# Patient Record
Sex: FEMALE | Race: WHITE | NOT HISPANIC OR LATINO | URBAN - METROPOLITAN AREA
[De-identification: names, ages, dates, MRNs, and addresses within clinical notes are randomized per-mention and may not be internally consistent; named-entity substitution may affect disease eponyms.]

---

## 2017-08-12 ENCOUNTER — EMERGENCY (EMERGENCY)
Facility: HOSPITAL | Age: 82
LOS: 0 days | Discharge: HOME | End: 2017-08-13

## 2017-08-12 DIAGNOSIS — M79.89 OTHER SPECIFIED SOFT TISSUE DISORDERS: ICD-10-CM

## 2017-08-12 DIAGNOSIS — K56.600 PARTIAL INTESTINAL OBSTRUCTION, UNSPECIFIED AS TO CAUSE: ICD-10-CM

## 2017-08-12 DIAGNOSIS — I10 ESSENTIAL (PRIMARY) HYPERTENSION: ICD-10-CM

## 2017-08-12 DIAGNOSIS — E78.00 PURE HYPERCHOLESTEROLEMIA, UNSPECIFIED: ICD-10-CM

## 2017-08-12 DIAGNOSIS — L03.011 CELLULITIS OF RIGHT FINGER: ICD-10-CM

## 2017-08-12 DIAGNOSIS — Z88.0 ALLERGY STATUS TO PENICILLIN: ICD-10-CM

## 2017-08-12 DIAGNOSIS — E03.9 HYPOTHYROIDISM, UNSPECIFIED: ICD-10-CM

## 2017-08-12 DIAGNOSIS — K29.70 GASTRITIS, UNSPECIFIED, WITHOUT BLEEDING: ICD-10-CM

## 2017-09-06 ENCOUNTER — INPATIENT (INPATIENT)
Facility: HOSPITAL | Age: 82
LOS: 1 days | Discharge: HOME | End: 2017-09-08
Attending: HOSPITALIST

## 2017-09-06 DIAGNOSIS — K56.600 PARTIAL INTESTINAL OBSTRUCTION, UNSPECIFIED AS TO CAUSE: ICD-10-CM

## 2017-09-06 DIAGNOSIS — K29.70 GASTRITIS, UNSPECIFIED, WITHOUT BLEEDING: ICD-10-CM

## 2017-09-12 DIAGNOSIS — K56.69 OTHER INTESTINAL OBSTRUCTION: ICD-10-CM

## 2017-09-12 DIAGNOSIS — N17.9 ACUTE KIDNEY FAILURE, UNSPECIFIED: ICD-10-CM

## 2017-09-12 DIAGNOSIS — N18.9 CHRONIC KIDNEY DISEASE, UNSPECIFIED: ICD-10-CM

## 2017-09-12 DIAGNOSIS — Z87.891 PERSONAL HISTORY OF NICOTINE DEPENDENCE: ICD-10-CM

## 2017-09-12 DIAGNOSIS — E03.9 HYPOTHYROIDISM, UNSPECIFIED: ICD-10-CM

## 2017-09-12 DIAGNOSIS — I12.9 HYPERTENSIVE CHRONIC KIDNEY DISEASE WITH STAGE 1 THROUGH STAGE 4 CHRONIC KIDNEY DISEASE, OR UNSPECIFIED CHRONIC KIDNEY DISEASE: ICD-10-CM

## 2017-09-12 DIAGNOSIS — K29.70 GASTRITIS, UNSPECIFIED, WITHOUT BLEEDING: ICD-10-CM

## 2017-09-12 DIAGNOSIS — Z88.0 ALLERGY STATUS TO PENICILLIN: ICD-10-CM

## 2017-09-12 DIAGNOSIS — E86.0 DEHYDRATION: ICD-10-CM

## 2017-09-12 DIAGNOSIS — I10 ESSENTIAL (PRIMARY) HYPERTENSION: ICD-10-CM

## 2017-09-26 PROBLEM — Z00.00 ENCOUNTER FOR PREVENTIVE HEALTH EXAMINATION: Status: ACTIVE | Noted: 2017-09-26

## 2018-02-08 ENCOUNTER — OUTPATIENT (OUTPATIENT)
Dept: OUTPATIENT SERVICES | Facility: HOSPITAL | Age: 83
LOS: 1 days | Discharge: HOME | End: 2018-02-08

## 2018-02-08 VITALS
SYSTOLIC BLOOD PRESSURE: 134 MMHG | TEMPERATURE: 96 F | OXYGEN SATURATION: 99 % | RESPIRATION RATE: 17 BRPM | HEART RATE: 42 BPM | DIASTOLIC BLOOD PRESSURE: 59 MMHG

## 2018-02-08 VITALS — SYSTOLIC BLOOD PRESSURE: 110 MMHG | RESPIRATION RATE: 18 BRPM | HEART RATE: 75 BPM | DIASTOLIC BLOOD PRESSURE: 57 MMHG

## 2018-02-08 DIAGNOSIS — Z98.890 OTHER SPECIFIED POSTPROCEDURAL STATES: Chronic | ICD-10-CM

## 2018-02-08 RX ORDER — ACETAMINOPHEN 500 MG
650 TABLET ORAL ONCE
Qty: 0 | Refills: 0 | Status: DISCONTINUED | OUTPATIENT
Start: 2018-02-08 | End: 2018-02-08

## 2018-02-13 DIAGNOSIS — H25.89 OTHER AGE-RELATED CATARACT: ICD-10-CM

## 2018-02-13 DIAGNOSIS — Z88.0 ALLERGY STATUS TO PENICILLIN: ICD-10-CM

## 2018-02-15 ENCOUNTER — OUTPATIENT (OUTPATIENT)
Dept: OUTPATIENT SERVICES | Facility: HOSPITAL | Age: 83
LOS: 1 days | Discharge: HOME | End: 2018-02-15

## 2018-02-15 VITALS
DIASTOLIC BLOOD PRESSURE: 59 MMHG | TEMPERATURE: 97 F | HEART RATE: 64 BPM | RESPIRATION RATE: 17 BRPM | SYSTOLIC BLOOD PRESSURE: 123 MMHG | OXYGEN SATURATION: 98 % | HEIGHT: 61 IN | WEIGHT: 132.06 LBS

## 2018-02-15 VITALS — SYSTOLIC BLOOD PRESSURE: 127 MMHG | HEART RATE: 98 BPM | DIASTOLIC BLOOD PRESSURE: 59 MMHG

## 2018-02-15 DIAGNOSIS — Z98.890 OTHER SPECIFIED POSTPROCEDURAL STATES: Chronic | ICD-10-CM

## 2018-02-15 RX ORDER — ACETAMINOPHEN 500 MG
325 TABLET ORAL ONCE
Qty: 0 | Refills: 0 | Status: ON HOLD | OUTPATIENT
Start: 2018-02-15

## 2018-02-15 RX ORDER — SODIUM CHLORIDE 9 MG/ML
1000 INJECTION INTRAMUSCULAR; INTRAVENOUS; SUBCUTANEOUS
Qty: 0 | Refills: 0 | Status: ON HOLD | OUTPATIENT
Start: 2018-02-15

## 2018-02-15 NOTE — PRE-ANESTHESIA EVALUATION ADULT - NSANTHOSAYNRD_GEN_A_CORE
No. JIM screening performed.  STOP BANG Legend: 0-2 = LOW Risk; 3-4 = INTERMEDIATE Risk; 5-8 = HIGH Risk

## 2018-02-20 DIAGNOSIS — H25.89 OTHER AGE-RELATED CATARACT: ICD-10-CM

## 2018-02-20 DIAGNOSIS — E03.9 HYPOTHYROIDISM, UNSPECIFIED: ICD-10-CM

## 2018-02-20 DIAGNOSIS — I10 ESSENTIAL (PRIMARY) HYPERTENSION: ICD-10-CM

## 2019-05-20 NOTE — PACU DISCHARGE NOTE - AIRWAY PATENCY:
Dr Sharma at bedside to evaluate pt for discharge from PACU. Pt Drowsy and oriented X3. Answers appropriately. States thirsty. Pt prepared fo transfer to ACU.   Satisfactory

## 2020-10-08 PROBLEM — E78.00 PURE HYPERCHOLESTEROLEMIA, UNSPECIFIED: Chronic | Status: ACTIVE | Noted: 2018-02-08

## 2020-10-08 PROBLEM — I10 ESSENTIAL (PRIMARY) HYPERTENSION: Chronic | Status: ACTIVE | Noted: 2018-02-08

## 2020-10-08 PROBLEM — E03.9 HYPOTHYROIDISM, UNSPECIFIED: Chronic | Status: ACTIVE | Noted: 2018-02-08

## 2020-10-12 ENCOUNTER — APPOINTMENT (OUTPATIENT)
Dept: OBGYN | Facility: CLINIC | Age: 85
End: 2020-10-12

## 2020-10-12 RX ORDER — AMLODIPINE BESYLATE 5 MG/1
5 TABLET ORAL
Qty: 90 | Refills: 0 | Status: ACTIVE | COMMUNITY
Start: 2020-10-06

## 2020-10-12 RX ORDER — ATORVASTATIN CALCIUM 20 MG/1
20 TABLET, FILM COATED ORAL
Qty: 90 | Refills: 0 | Status: ACTIVE | COMMUNITY
Start: 2020-10-06

## 2020-10-12 RX ORDER — LEVOTHYROXINE SODIUM 50 UG/1
50 TABLET ORAL
Qty: 90 | Refills: 0 | Status: ACTIVE | COMMUNITY
Start: 2020-10-02

## 2020-10-12 RX ORDER — VALSARTAN AND HYDROCHLOROTHIAZIDE 160; 12.5 MG/1; MG/1
160-12.5 TABLET, FILM COATED ORAL
Qty: 90 | Refills: 0 | Status: ACTIVE | COMMUNITY
Start: 2020-10-06

## 2020-11-25 ENCOUNTER — APPOINTMENT (OUTPATIENT)
Dept: OBGYN | Facility: CLINIC | Age: 85
End: 2020-11-25

## 2021-06-21 ENCOUNTER — APPOINTMENT (OUTPATIENT)
Dept: UROLOGY | Facility: CLINIC | Age: 86
End: 2021-06-21
Payer: MEDICARE

## 2021-06-21 VITALS
SYSTOLIC BLOOD PRESSURE: 112 MMHG | BODY MASS INDEX: 23.55 KG/M2 | DIASTOLIC BLOOD PRESSURE: 74 MMHG | HEIGHT: 62 IN | HEART RATE: 80 BPM | WEIGHT: 128 LBS

## 2021-06-21 DIAGNOSIS — N90.89 OTHER SPECIFIED NONINFLAMMATORY DISORDERS OF VULVA AND PERINEUM: ICD-10-CM

## 2021-06-21 DIAGNOSIS — R35.0 FREQUENCY OF MICTURITION: ICD-10-CM

## 2021-06-21 PROCEDURE — 99203 OFFICE O/P NEW LOW 30 MIN: CPT

## 2021-06-21 RX ORDER — NYSTATIN 100000 [USP'U]/G
100000 CREAM TOPICAL TWICE DAILY
Qty: 1 | Refills: 1 | Status: ACTIVE | COMMUNITY
Start: 2021-06-21 | End: 1900-01-01

## 2021-06-21 NOTE — ASSESSMENT
[FreeTextEntry1] : The patient is an 87F with a h/o:\par \par 1. Urinary frequency and UI.\par I don't know what meds she is on and our bladder scanner is not working.\par We will send her for a RBUS with PVR and will send her urine for a UA C&S with micro\par I urged her to cut down her coffee consumption.\par She will bring her meds to her next visit so I know what she is taking\par \par 2. Likely yeast infection:\par I will give her Nystatin for her EXTERNAL genitalia only.\par apply BID\par \par 3. [place]\par She refuses an audiology referral\par \par \par LABS/TESTS Ordered: RBUS with PVR, UA C&S\par Meds Ordered: Nystatin; bring in all meds to next visit\par Follow up: 3-4 weeks\par \par \par \par Devi Chapa MD\par Associate \par Department of Urology\par [facility]\par Phone: [phone]\par Fax: [phone]\par \par [address] [place] [address]\par [facility] [address]\par

## 2021-06-21 NOTE — HISTORY OF PRESENT ILLNESS
[FreeTextEntry1] : Barriers to care: Very [place]\par \par CC: urinary frequency\par \par \par HPI:\par The patient is an 87F unaccompanied, who c/o urinary frequency since 2021.\par She reports that this happens day and night.\par She wears Kotex for protection about 5-6/day\par She is markedly constipated.\par She denies straining to void. She sometimes leaks when she just stands. \par She states she is very "itchy" and has had genital itching since May 2021. She does not have a gynecologist.\par She takes showers not baths. She has 2c coffee per day. She rarely drinks tea or soda. She does not drink EtOH or spicy / acidic foods.\par She has no h/o  trauma or surgery.\par She is , both \par She went through menopause around age 54 or 54yo.\par \par \par PMH: [place], ?hypothyroid\par PSH: Diverticulitis\par POBH: See above\par MEDS: She does not know her meds\par Thyroid med\par SOC: Quit Tob around age 58yo, Denies EtOH, Denies drugs\par ALL: Denies\par \par \par \par Data:\par 2021: BUN 28, sCre 1.42

## 2021-06-21 NOTE — PHYSICAL EXAM
[General Appearance - Well Developed] : well developed [General Appearance - Well Nourished] : well nourished [Normal Appearance] : normal appearance [Well Groomed] : well groomed [General Appearance - In No Acute Distress] : no acute distress [Exaggerated Use Of Accessory Muscles For Inspiration] : no accessory muscle use [Abdomen Soft] : soft [Abdomen Tenderness] : non-tender [Skin Color & Pigmentation] : normal skin color and pigmentation [Skin Turgor] : supple [] : no rash [FreeTextEntry1] : Extremely atrophic genitalia; some redness of labia majora. Mild. No exudate, purulence, fluctuance, lesions

## 2021-07-13 ENCOUNTER — EMERGENCY (EMERGENCY)
Facility: HOSPITAL | Age: 86
LOS: 0 days | Discharge: HOME | End: 2021-07-13
Attending: EMERGENCY MEDICINE | Admitting: EMERGENCY MEDICINE
Payer: MEDICARE

## 2021-07-13 VITALS
SYSTOLIC BLOOD PRESSURE: 134 MMHG | HEIGHT: 61 IN | RESPIRATION RATE: 18 BRPM | HEART RATE: 83 BPM | OXYGEN SATURATION: 98 % | TEMPERATURE: 98 F | DIASTOLIC BLOOD PRESSURE: 60 MMHG

## 2021-07-13 DIAGNOSIS — Z98.890 OTHER SPECIFIED POSTPROCEDURAL STATES: Chronic | ICD-10-CM

## 2021-07-13 DIAGNOSIS — E03.9 HYPOTHYROIDISM, UNSPECIFIED: ICD-10-CM

## 2021-07-13 DIAGNOSIS — L02.415 CUTANEOUS ABSCESS OF RIGHT LOWER LIMB: ICD-10-CM

## 2021-07-13 DIAGNOSIS — I10 ESSENTIAL (PRIMARY) HYPERTENSION: ICD-10-CM

## 2021-07-13 DIAGNOSIS — Z88.0 ALLERGY STATUS TO PENICILLIN: ICD-10-CM

## 2021-07-13 DIAGNOSIS — Z98.890 OTHER SPECIFIED POSTPROCEDURAL STATES: ICD-10-CM

## 2021-07-13 DIAGNOSIS — L03.115 CELLULITIS OF RIGHT LOWER LIMB: ICD-10-CM

## 2021-07-13 DIAGNOSIS — M25.571 PAIN IN RIGHT ANKLE AND JOINTS OF RIGHT FOOT: ICD-10-CM

## 2021-07-13 DIAGNOSIS — Z79.890 HORMONE REPLACEMENT THERAPY: ICD-10-CM

## 2021-07-13 DIAGNOSIS — E78.00 PURE HYPERCHOLESTEROLEMIA, UNSPECIFIED: ICD-10-CM

## 2021-07-13 DIAGNOSIS — Z79.899 OTHER LONG TERM (CURRENT) DRUG THERAPY: ICD-10-CM

## 2021-07-13 PROCEDURE — 99283 EMERGENCY DEPT VISIT LOW MDM: CPT | Mod: 25

## 2021-07-13 PROCEDURE — 10060 I&D ABSCESS SIMPLE/SINGLE: CPT

## 2021-07-13 RX ADMIN — Medication 100 MILLIGRAM(S): at 20:07

## 2021-07-13 NOTE — ED PROVIDER NOTE - NSFOLLOWUPINSTRUCTIONS_ED_ALL_ED_FT
Follow up with your primary care doctor in 2 days for wound check    Cellulitis    WHAT YOU NEED TO KNOW:    Cellulitis is a skin infection caused by bacteria. Cellulitis may go away on its own or you may need treatment. Your healthcare provider may draw a [place] around the outside edges of your cellulitis. If your cellulitis spreads, your healthcare provider will see it outside of the [place]. Cellulitis          DISCHARGE INSTRUCTIONS:    Call 911 if:     You have sudden trouble breathing or chest pain.        Return to the emergency department if:     Your wound gets larger and more painful.       You feel a crackling under your skin when you touch it.      You have purple dots or bumps on your skin, or you see bleeding under your skin.      You have new swelling and pain in your legs.      The red, warm, swollen area gets larger.      You see red streaks coming from the infected area.    Contact your healthcare provider if:     You have a fever.      Your fever or pain does not go away or gets worse.      The area does not get smaller after 2 days of antibiotics.      Your skin is flaking or peeling off.      You have questions or concerns about your condition or care.    Medicines:     Antibiotics help treat the bacterial infection.       NSAIDs, such as ibuprofen, help decrease swelling, pain, and fever. NSAIDs can cause stomach bleeding or kidney problems in certain people. If you take blood thinner medicine, always ask if NSAIDs are safe for you. Always read the medicine label and follow directions. Do not give these medicines to children under 6 months of age without direction from your child's healthcare provider.      Acetaminophen decreases pain and fever. It is available without a doctor's order. Ask how much to take and how often to take it. Follow directions. Read the labels of all other medicines you are using to see if they also contain acetaminophen, or ask your doctor or pharmacist. Acetaminophen can cause liver damage if not taken correctly. Do not use more than 4 grams (4,000 milligrams) total of acetaminophen in one day.       Take your medicine as directed. Contact your healthcare provider if you think your medicine is not helping or if you have side effects. Tell him or her if you are allergic to any medicine. Keep a list of the medicines, vitamins, and herbs you take. Include the amounts, and when and why you take them. Bring the list or the pill bottles to follow-up visits. Carry your medicine list with you in case of an emergency.    Self-care:     Elevate the area above the level of your heart as often as you can. This will help decrease swelling and pain. Prop the area on pillows or blankets to keep it elevated comfortably.       Clean the area daily until the wound scabs over. Gently wash the area with soap and water. Pat dry. Use dressings as directed.       Place cool or warm, wet cloths on the area as directed. Use clean cloths and clean water. Leave it on the area until the cloth is room temperature. Pat the area dry with a clean, dry cloth. The cloths may help decrease pain.     Prevent cellulitis:     Do not scratch bug bites or areas of injury. You increase your risk for cellulitis by scratching these areas.       Do not share personal items, such as towels, clothing, and razors.       Clean exercise equipment with germ-killing  before and after you use it.      Wash your hands often. Use soap and water. Wash your hands after you use the bathroom, change a child's diapers, or sneeze. Wash your hands before you prepare or eat food. Use lotion to prevent dry, cracked skin. Handwashing           Wear pressure stockings as directed. You may be told to wear the stockings if you have peripheral edema. The stockings improve blood flow and decrease swelling.      Treat athlete’s foot. This can help prevent the spread of a bacterial skin infection.    Follow up with your healthcare provider within 3 days, or as directed: Your healthcare provider will check if your cellulitis is getting better. You may need different medicine. Write down your questions so you remember to ask them during your visits.       © Copyright [employer] 2019 All illustrations and images included in CareNotes are the copyrighted property of [employer] or [employer]       Abscess    WHAT YOU NEED TO KNOW:    A warm compress may help your abscess drain. Your healthcare provider may make a cut in the abscess so it can drain. You may need surgery to remove an abscess that is on your hands or buttocks.     DISCHARGE INSTRUCTIONS:    Return to the emergency department if:     The area around your abscess becomes very painful, warm, or has red streaks.      You have a fever and chills.      Your heart is beating faster than usual.       You feel faint or confused.    Contact your healthcare provider if:     Your abscess gets bigger or does not get better.      Your abscess returns.      You have questions or concerns about your condition or care.    Medicines: You may need any of the following:     Antibiotics help treat a bacterial infection.       Acetaminophen decreases pain and fever. It is available without a doctor's order. Ask how much to take and how often to take it. Follow directions. Acetaminophen can cause liver damage if not taken correctly.      NSAIDs, such as ibuprofen, help decrease swelling, pain, and fever. This medicine is available with or without a doctor's order. NSAIDs can cause stomach bleeding or kidney problems in certain people. If you take blood thinner medicine, always ask your healthcare provider if NSAIDs are safe for you. Always read the medicine label and follow directions.      Take your medicine as directed. Contact your healthcare provider if you think your medicine is not helping or if you have side effects. Tell him or her if you are allergic to any medicine. Keep a list of the medicines, vitamins, and herbs you take. Include the amounts, and when and why you take them. Bring the list or the pill bottles to follow-up visits. Carry your medicine list with you in case of an emergency.    Self-care:     Apply a warm compress to your abscess. This will help it open and drain. Wet a washcloth in warm, but not hot, water. Apply the compress for 10 minutes. Repeat this 4 times each day. Do not press on an abscess or try to open it with a needle. You may push the bacteria deeper or into your blood.       Do not share your clothes, towels, or sheets with anyone. This can spread the infection to others.       Wash your hands often. This can help prevent the spread of germs. Use soap and water or an alcohol-based hand rub.     Care for your wound after it is drained:     Care for your wound as directed. If your healthcare provider says it is okay, carefully remove the bandage and gauze packing. You may need to soak the gauze to get it out of your wound. Clean your wound and the area around it as directed. Dry the area and put on new, clean bandages. Change your bandages when they get wet or dirty.       Ask your healthcare provider how to change the gauze in your wound. Keep track of how many pieces of gauze are placed inside the wound. Do not put too much packing in the wound. Do not pack the gauze too tightly in your wound.     Follow up with your healthcare provider in 1 to 3 days: You may need to have your packing removed or your bandage changed. Write down your questions so you remember to ask them during your visits.        © Copyright [employer] 2019 All illustrations and images included in CareNotes are the copyrighted property of [employer] or [employer].

## 2021-07-13 NOTE — ED PROVIDER NOTE - PHYSICAL EXAMINATION
Physical Exam    Vital Signs: I have reviewed the initial vital signs.  Constitutional: well-nourished, appears stated age, no acute distress  Musculoskeletal: supple neck, no lower extremity edema, no midline tenderness  Integumentary: warm, dry, + silver dollar sized area to lower leg with scab and fluctuance noted. erythema extending from center.   Neurologic: awake, alert, cranial nerves II-XII grossly intact, extremities’ motor and sensory functions grossly intact  Psychiatric: appropriate mood, appropriate affect

## 2021-07-13 NOTE — ED PROVIDER NOTE - OBJECTIVE STATEMENT
87 year old female states that she cut herself about 9-10 days ago and developed large scab and now states its swollen and starting to turn red. denies fever/chills.

## 2021-07-13 NOTE — ED ADULT TRIAGE NOTE - CHIEF COMPLAINT QUOTE
Patient dropped large china cabinet on her foot/ankle 9 days ago. Daughter reports the wound is not healing since then. Patient reports no issues ambulating, no pain since injury.

## 2021-07-13 NOTE — ED PROVIDER NOTE - PATIENT PORTAL LINK FT
You can access the FollowMyHealth Patient Portal offered by [facility] by registering at the following website: [email or web address]. By joining [facility]’s FollowMyHealth portal, you will also be able to view your health information using other applications (apps) compatible with our system.

## 2021-07-13 NOTE — ED PROVIDER NOTE - NS ED ROS FT
Constitutional: (-) fever  Musculoskeletal: (-) neck pain, (-) back pain, (-) joint pain  Integumentary: (+) infection  Neurological: (-) headache, (-) altered mental status

## 2021-07-13 NOTE — ED PROVIDER NOTE - NSFOLLOWUPCLINICS_GEN_ALL_ED_FT
[facility]  Burn  [address], [address]  [place] [address]  Phone: [phone]  Fax:

## 2021-07-19 ENCOUNTER — APPOINTMENT (OUTPATIENT)
Dept: UROLOGY | Facility: CLINIC | Age: 86
End: 2021-07-19

## 2021-07-20 ENCOUNTER — APPOINTMENT (OUTPATIENT)
Dept: BURN CARE | Facility: CLINIC | Age: 86
End: 2021-07-20

## 2021-09-16 NOTE — ASU PATIENT PROFILE, ADULT - PATIENT'S HEIGHT AND WEIGHT RECORDED IN THE VITAL SIGNS FLOWSHEET
yes No Repair - Repaired With Adjacent Surgical Defect Text (Leave Blank If You Do Not Want): After the excision the defect was repaired concurrently with another surgical defect which was in close approximation.

## 2022-01-01 ENCOUNTER — INPATIENT (INPATIENT)
Facility: HOSPITAL | Age: 87
LOS: 19 days | End: 2022-09-27
Attending: INTERNAL MEDICINE | Admitting: INTERNAL MEDICINE

## 2022-01-01 VITALS
RESPIRATION RATE: 21 BRPM | DIASTOLIC BLOOD PRESSURE: 68 MMHG | SYSTOLIC BLOOD PRESSURE: 133 MMHG | HEART RATE: 113 BPM | OXYGEN SATURATION: 90 %

## 2022-01-01 VITALS
SYSTOLIC BLOOD PRESSURE: 171 MMHG | RESPIRATION RATE: 12 BRPM | HEART RATE: 83 BPM | DIASTOLIC BLOOD PRESSURE: 75 MMHG | OXYGEN SATURATION: 100 %

## 2022-01-01 DIAGNOSIS — F41.9 ANXIETY DISORDER, UNSPECIFIED: ICD-10-CM

## 2022-01-01 DIAGNOSIS — R53.81 OTHER MALAISE: ICD-10-CM

## 2022-01-01 DIAGNOSIS — Z98.890 OTHER SPECIFIED POSTPROCEDURAL STATES: Chronic | ICD-10-CM

## 2022-01-01 DIAGNOSIS — Z51.5 ENCOUNTER FOR PALLIATIVE CARE: ICD-10-CM

## 2022-01-01 DIAGNOSIS — J96.90 RESPIRATORY FAILURE, UNSPECIFIED, UNSPECIFIED WHETHER WITH HYPOXIA OR HYPERCAPNIA: ICD-10-CM

## 2022-01-01 DIAGNOSIS — Z71.89 OTHER SPECIFIED COUNSELING: ICD-10-CM

## 2022-01-01 LAB
A1C WITH ESTIMATED AVERAGE GLUCOSE RESULT: 4.9 % — SIGNIFICANT CHANGE UP (ref 4–5.6)
ALBUMIN SERPL ELPH-MCNC: 2.8 G/DL — LOW (ref 3.5–5.2)
ALBUMIN SERPL ELPH-MCNC: 2.8 G/DL — LOW (ref 3.5–5.2)
ALBUMIN SERPL ELPH-MCNC: 2.9 G/DL — LOW (ref 3.5–5.2)
ALBUMIN SERPL ELPH-MCNC: 2.9 G/DL — LOW (ref 3.5–5.2)
ALBUMIN SERPL ELPH-MCNC: 3 G/DL — LOW (ref 3.5–5.2)
ALBUMIN SERPL ELPH-MCNC: 3.1 G/DL — LOW (ref 3.5–5.2)
ALBUMIN SERPL ELPH-MCNC: 3.2 G/DL — LOW (ref 3.5–5.2)
ALBUMIN SERPL ELPH-MCNC: 3.3 G/DL — LOW (ref 3.5–5.2)
ALBUMIN SERPL ELPH-MCNC: 3.4 G/DL — LOW (ref 3.5–5.2)
ALBUMIN SERPL ELPH-MCNC: 3.4 G/DL — LOW (ref 3.5–5.2)
ALBUMIN SERPL ELPH-MCNC: 3.5 G/DL — SIGNIFICANT CHANGE UP (ref 3.5–5.2)
ALBUMIN SERPL ELPH-MCNC: 3.5 G/DL — SIGNIFICANT CHANGE UP (ref 3.5–5.2)
ALBUMIN SERPL ELPH-MCNC: 4.4 G/DL — SIGNIFICANT CHANGE UP (ref 3.5–5.2)
ALP SERPL-CCNC: 100 U/L — SIGNIFICANT CHANGE UP (ref 30–115)
ALP SERPL-CCNC: 100 U/L — SIGNIFICANT CHANGE UP (ref 30–115)
ALP SERPL-CCNC: 104 U/L — SIGNIFICANT CHANGE UP (ref 30–115)
ALP SERPL-CCNC: 106 U/L — SIGNIFICANT CHANGE UP (ref 30–115)
ALP SERPL-CCNC: 106 U/L — SIGNIFICANT CHANGE UP (ref 30–115)
ALP SERPL-CCNC: 107 U/L — SIGNIFICANT CHANGE UP (ref 30–115)
ALP SERPL-CCNC: 107 U/L — SIGNIFICANT CHANGE UP (ref 30–115)
ALP SERPL-CCNC: 113 U/L — SIGNIFICANT CHANGE UP (ref 30–115)
ALP SERPL-CCNC: 113 U/L — SIGNIFICANT CHANGE UP (ref 30–115)
ALP SERPL-CCNC: 115 U/L — SIGNIFICANT CHANGE UP (ref 30–115)
ALP SERPL-CCNC: 120 U/L — HIGH (ref 30–115)
ALP SERPL-CCNC: 151 U/L — HIGH (ref 30–115)
ALP SERPL-CCNC: 156 U/L — HIGH (ref 30–115)
ALP SERPL-CCNC: 175 U/L — HIGH (ref 30–115)
ALP SERPL-CCNC: 86 U/L — SIGNIFICANT CHANGE UP (ref 30–115)
ALP SERPL-CCNC: 91 U/L — SIGNIFICANT CHANGE UP (ref 30–115)
ALP SERPL-CCNC: 95 U/L — SIGNIFICANT CHANGE UP (ref 30–115)
ALP SERPL-CCNC: 96 U/L — SIGNIFICANT CHANGE UP (ref 30–115)
ALP SERPL-CCNC: 97 U/L — SIGNIFICANT CHANGE UP (ref 30–115)
ALP SERPL-CCNC: 97 U/L — SIGNIFICANT CHANGE UP (ref 30–115)
ALP SERPL-CCNC: 99 U/L — SIGNIFICANT CHANGE UP (ref 30–115)
ALT FLD-CCNC: 10 U/L — SIGNIFICANT CHANGE UP (ref 0–41)
ALT FLD-CCNC: 12 U/L — SIGNIFICANT CHANGE UP (ref 0–41)
ALT FLD-CCNC: 13 U/L — SIGNIFICANT CHANGE UP (ref 0–41)
ALT FLD-CCNC: 16 U/L — SIGNIFICANT CHANGE UP (ref 0–41)
ALT FLD-CCNC: 20 U/L — SIGNIFICANT CHANGE UP (ref 0–41)
ALT FLD-CCNC: 21 U/L — SIGNIFICANT CHANGE UP (ref 0–41)
ALT FLD-CCNC: 23 U/L — SIGNIFICANT CHANGE UP (ref 0–41)
ALT FLD-CCNC: 23 U/L — SIGNIFICANT CHANGE UP (ref 0–41)
ALT FLD-CCNC: 29 U/L — SIGNIFICANT CHANGE UP (ref 0–41)
ALT FLD-CCNC: 30 U/L — SIGNIFICANT CHANGE UP (ref 0–41)
ALT FLD-CCNC: 32 U/L — SIGNIFICANT CHANGE UP (ref 0–41)
ALT FLD-CCNC: 38 U/L — SIGNIFICANT CHANGE UP (ref 0–41)
ALT FLD-CCNC: 42 U/L — HIGH (ref 0–41)
ALT FLD-CCNC: 42 U/L — HIGH (ref 0–41)
ALT FLD-CCNC: 63 U/L — HIGH (ref 0–41)
ALT FLD-CCNC: 87 U/L — HIGH (ref 0–41)
ALT FLD-CCNC: 9 U/L — SIGNIFICANT CHANGE UP (ref 0–41)
ANION GAP SERPL CALC-SCNC: 11 MMOL/L — SIGNIFICANT CHANGE UP (ref 7–14)
ANION GAP SERPL CALC-SCNC: 12 MMOL/L — SIGNIFICANT CHANGE UP (ref 7–14)
ANION GAP SERPL CALC-SCNC: 12 MMOL/L — SIGNIFICANT CHANGE UP (ref 7–14)
ANION GAP SERPL CALC-SCNC: 13 MMOL/L — SIGNIFICANT CHANGE UP (ref 7–14)
ANION GAP SERPL CALC-SCNC: 15 MMOL/L — HIGH (ref 7–14)
ANION GAP SERPL CALC-SCNC: 5 MMOL/L — LOW (ref 7–14)
ANION GAP SERPL CALC-SCNC: 6 MMOL/L — LOW (ref 7–14)
ANION GAP SERPL CALC-SCNC: 7 MMOL/L — SIGNIFICANT CHANGE UP (ref 7–14)
ANION GAP SERPL CALC-SCNC: 8 MMOL/L — SIGNIFICANT CHANGE UP (ref 7–14)
ANION GAP SERPL CALC-SCNC: 9 MMOL/L — SIGNIFICANT CHANGE UP (ref 7–14)
ANION GAP SERPL CALC-SCNC: 9 MMOL/L — SIGNIFICANT CHANGE UP (ref 7–14)
ANISOCYTOSIS BLD QL: SLIGHT — SIGNIFICANT CHANGE UP
APPEARANCE UR: CLEAR — SIGNIFICANT CHANGE UP
AST SERPL-CCNC: 13 U/L — SIGNIFICANT CHANGE UP (ref 0–41)
AST SERPL-CCNC: 14 U/L — SIGNIFICANT CHANGE UP (ref 0–41)
AST SERPL-CCNC: 14 U/L — SIGNIFICANT CHANGE UP (ref 0–41)
AST SERPL-CCNC: 17 U/L — SIGNIFICANT CHANGE UP (ref 0–41)
AST SERPL-CCNC: 18 U/L — SIGNIFICANT CHANGE UP (ref 0–41)
AST SERPL-CCNC: 19 U/L — SIGNIFICANT CHANGE UP (ref 0–41)
AST SERPL-CCNC: 19 U/L — SIGNIFICANT CHANGE UP (ref 0–41)
AST SERPL-CCNC: 20 U/L — SIGNIFICANT CHANGE UP (ref 0–41)
AST SERPL-CCNC: 21 U/L — SIGNIFICANT CHANGE UP (ref 0–41)
AST SERPL-CCNC: 22 U/L — SIGNIFICANT CHANGE UP (ref 0–41)
AST SERPL-CCNC: 23 U/L — SIGNIFICANT CHANGE UP (ref 0–41)
AST SERPL-CCNC: 24 U/L — SIGNIFICANT CHANGE UP (ref 0–41)
AST SERPL-CCNC: 25 U/L — SIGNIFICANT CHANGE UP (ref 0–41)
AST SERPL-CCNC: 28 U/L — SIGNIFICANT CHANGE UP (ref 0–41)
AST SERPL-CCNC: 35 U/L — SIGNIFICANT CHANGE UP (ref 0–41)
AST SERPL-CCNC: 39 U/L — SIGNIFICANT CHANGE UP (ref 0–41)
AST SERPL-CCNC: 86 U/L — HIGH (ref 0–41)
BASE EXCESS BLDA CALC-SCNC: -0.3 MMOL/L — SIGNIFICANT CHANGE UP (ref -2–3)
BASE EXCESS BLDA CALC-SCNC: -0.3 MMOL/L — SIGNIFICANT CHANGE UP (ref -2–3)
BASE EXCESS BLDA CALC-SCNC: -4.1 MMOL/L — LOW (ref -2–3)
BASE EXCESS BLDA CALC-SCNC: 0.3 MMOL/L — SIGNIFICANT CHANGE UP (ref -2–3)
BASE EXCESS BLDA CALC-SCNC: 0.7 MMOL/L — SIGNIFICANT CHANGE UP (ref -2–3)
BASE EXCESS BLDA CALC-SCNC: 11.4 MMOL/L — HIGH (ref -2–3)
BASE EXCESS BLDA CALC-SCNC: 2.3 MMOL/L — SIGNIFICANT CHANGE UP (ref -2–3)
BASE EXCESS BLDA CALC-SCNC: 4.1 MMOL/L — HIGH (ref -2–3)
BASE EXCESS BLDA CALC-SCNC: 4.5 MMOL/L — HIGH (ref -2–3)
BASE EXCESS BLDA CALC-SCNC: 5.6 MMOL/L — HIGH (ref -2–3)
BASE EXCESS BLDA CALC-SCNC: 6.5 MMOL/L — HIGH (ref -2–3)
BASE EXCESS BLDA CALC-SCNC: 7.6 MMOL/L — HIGH (ref -2–3)
BASE EXCESS BLDA CALC-SCNC: 9.4 MMOL/L — HIGH (ref -2–3)
BASE EXCESS BLDA CALC-SCNC: 9.5 MMOL/L — HIGH (ref -2–3)
BASOPHILS # BLD AUTO: 0 K/UL — SIGNIFICANT CHANGE UP (ref 0–0.2)
BASOPHILS # BLD AUTO: 0.02 K/UL — SIGNIFICANT CHANGE UP (ref 0–0.2)
BASOPHILS # BLD AUTO: 0.03 K/UL — SIGNIFICANT CHANGE UP (ref 0–0.2)
BASOPHILS # BLD AUTO: 0.04 K/UL — SIGNIFICANT CHANGE UP (ref 0–0.2)
BASOPHILS # BLD AUTO: 0.06 K/UL — SIGNIFICANT CHANGE UP (ref 0–0.2)
BASOPHILS # BLD AUTO: 0.06 K/UL — SIGNIFICANT CHANGE UP (ref 0–0.2)
BASOPHILS # BLD AUTO: 0.07 K/UL — SIGNIFICANT CHANGE UP (ref 0–0.2)
BASOPHILS NFR BLD AUTO: 0 % — SIGNIFICANT CHANGE UP (ref 0–1)
BASOPHILS NFR BLD AUTO: 0.2 % — SIGNIFICANT CHANGE UP (ref 0–1)
BASOPHILS NFR BLD AUTO: 0.3 % — SIGNIFICANT CHANGE UP (ref 0–1)
BASOPHILS NFR BLD AUTO: 0.3 % — SIGNIFICANT CHANGE UP (ref 0–1)
BASOPHILS NFR BLD AUTO: 0.5 % — SIGNIFICANT CHANGE UP (ref 0–1)
BASOPHILS NFR BLD AUTO: 0.6 % — SIGNIFICANT CHANGE UP (ref 0–1)
BASOPHILS NFR BLD AUTO: 0.7 % — SIGNIFICANT CHANGE UP (ref 0–1)
BASOPHILS NFR BLD AUTO: 0.9 % — SIGNIFICANT CHANGE UP (ref 0–1)
BASOPHILS NFR BLD AUTO: 0.9 % — SIGNIFICANT CHANGE UP (ref 0–1)
BILIRUB SERPL-MCNC: 0.2 MG/DL — SIGNIFICANT CHANGE UP (ref 0.2–1.2)
BILIRUB SERPL-MCNC: 0.2 MG/DL — SIGNIFICANT CHANGE UP (ref 0.2–1.2)
BILIRUB SERPL-MCNC: 0.3 MG/DL — SIGNIFICANT CHANGE UP (ref 0.2–1.2)
BILIRUB SERPL-MCNC: 0.4 MG/DL — SIGNIFICANT CHANGE UP (ref 0.2–1.2)
BILIRUB SERPL-MCNC: 0.5 MG/DL — SIGNIFICANT CHANGE UP (ref 0.2–1.2)
BILIRUB SERPL-MCNC: 0.5 MG/DL — SIGNIFICANT CHANGE UP (ref 0.2–1.2)
BILIRUB UR-MCNC: NEGATIVE — SIGNIFICANT CHANGE UP
BUN SERPL-MCNC: 27 MG/DL — HIGH (ref 10–20)
BUN SERPL-MCNC: 34 MG/DL — HIGH (ref 10–20)
BUN SERPL-MCNC: 35 MG/DL — HIGH (ref 10–20)
BUN SERPL-MCNC: 36 MG/DL — HIGH (ref 10–20)
BUN SERPL-MCNC: 38 MG/DL — HIGH (ref 10–20)
BUN SERPL-MCNC: 38 MG/DL — HIGH (ref 10–20)
BUN SERPL-MCNC: 40 MG/DL — HIGH (ref 10–20)
BUN SERPL-MCNC: 41 MG/DL — HIGH (ref 10–20)
BUN SERPL-MCNC: 44 MG/DL — HIGH (ref 10–20)
BUN SERPL-MCNC: 44 MG/DL — HIGH (ref 10–20)
BUN SERPL-MCNC: 45 MG/DL — HIGH (ref 10–20)
BUN SERPL-MCNC: 46 MG/DL — HIGH (ref 10–20)
BUN SERPL-MCNC: 49 MG/DL — HIGH (ref 10–20)
BUN SERPL-MCNC: 50 MG/DL — HIGH (ref 10–20)
BUN SERPL-MCNC: 50 MG/DL — HIGH (ref 10–20)
BUN SERPL-MCNC: 51 MG/DL — HIGH (ref 10–20)
BUN SERPL-MCNC: 52 MG/DL — HIGH (ref 10–20)
BUN SERPL-MCNC: 53 MG/DL — HIGH (ref 10–20)
BUN SERPL-MCNC: 55 MG/DL — HIGH (ref 10–20)
BUN SERPL-MCNC: 57 MG/DL — HIGH (ref 10–20)
BUN SERPL-MCNC: 57 MG/DL — HIGH (ref 10–20)
BURR CELLS BLD QL SMEAR: PRESENT — SIGNIFICANT CHANGE UP
CALCIUM SERPL-MCNC: 7.9 MG/DL — LOW (ref 8.5–10.1)
CALCIUM SERPL-MCNC: 8.2 MG/DL — LOW (ref 8.5–10.1)
CALCIUM SERPL-MCNC: 8.2 MG/DL — LOW (ref 8.5–10.1)
CALCIUM SERPL-MCNC: 8.3 MG/DL — LOW (ref 8.5–10.1)
CALCIUM SERPL-MCNC: 8.3 MG/DL — LOW (ref 8.5–10.1)
CALCIUM SERPL-MCNC: 8.4 MG/DL — SIGNIFICANT CHANGE UP (ref 8.4–10.5)
CALCIUM SERPL-MCNC: 8.6 MG/DL — SIGNIFICANT CHANGE UP (ref 8.4–10.5)
CALCIUM SERPL-MCNC: 8.6 MG/DL — SIGNIFICANT CHANGE UP (ref 8.5–10.1)
CALCIUM SERPL-MCNC: 8.7 MG/DL — SIGNIFICANT CHANGE UP (ref 8.4–10.4)
CALCIUM SERPL-MCNC: 8.7 MG/DL — SIGNIFICANT CHANGE UP (ref 8.4–10.5)
CALCIUM SERPL-MCNC: 8.8 MG/DL — SIGNIFICANT CHANGE UP (ref 8.4–10.4)
CALCIUM SERPL-MCNC: 8.8 MG/DL — SIGNIFICANT CHANGE UP (ref 8.4–10.4)
CALCIUM SERPL-MCNC: 8.8 MG/DL — SIGNIFICANT CHANGE UP (ref 8.4–10.5)
CALCIUM SERPL-MCNC: 8.9 MG/DL — SIGNIFICANT CHANGE UP (ref 8.4–10.5)
CALCIUM SERPL-MCNC: 9.1 MG/DL — SIGNIFICANT CHANGE UP (ref 8.4–10.4)
CALCIUM SERPL-MCNC: 9.1 MG/DL — SIGNIFICANT CHANGE UP (ref 8.4–10.5)
CALCIUM SERPL-MCNC: 9.5 MG/DL — SIGNIFICANT CHANGE UP (ref 8.5–10.1)
CHLORIDE SERPL-SCNC: 100 MMOL/L — SIGNIFICANT CHANGE UP (ref 98–110)
CHLORIDE SERPL-SCNC: 101 MMOL/L — SIGNIFICANT CHANGE UP (ref 98–110)
CHLORIDE SERPL-SCNC: 102 MMOL/L — SIGNIFICANT CHANGE UP (ref 98–110)
CHLORIDE SERPL-SCNC: 103 MMOL/L — SIGNIFICANT CHANGE UP (ref 98–110)
CHLORIDE SERPL-SCNC: 104 MMOL/L — SIGNIFICANT CHANGE UP (ref 98–110)
CHLORIDE SERPL-SCNC: 108 MMOL/L — SIGNIFICANT CHANGE UP (ref 98–110)
CHLORIDE SERPL-SCNC: 90 MMOL/L — LOW (ref 98–110)
CHLORIDE SERPL-SCNC: 93 MMOL/L — LOW (ref 98–110)
CHLORIDE SERPL-SCNC: 94 MMOL/L — LOW (ref 98–110)
CHLORIDE SERPL-SCNC: 95 MMOL/L — LOW (ref 98–110)
CHLORIDE SERPL-SCNC: 95 MMOL/L — LOW (ref 98–110)
CHLORIDE SERPL-SCNC: 96 MMOL/L — LOW (ref 98–110)
CHLORIDE SERPL-SCNC: 98 MMOL/L — SIGNIFICANT CHANGE UP (ref 98–110)
CO2 SERPL-SCNC: 20 MMOL/L — SIGNIFICANT CHANGE UP (ref 17–32)
CO2 SERPL-SCNC: 22 MMOL/L — SIGNIFICANT CHANGE UP (ref 17–32)
CO2 SERPL-SCNC: 27 MMOL/L — SIGNIFICANT CHANGE UP (ref 17–32)
CO2 SERPL-SCNC: 29 MMOL/L — SIGNIFICANT CHANGE UP (ref 17–32)
CO2 SERPL-SCNC: 29 MMOL/L — SIGNIFICANT CHANGE UP (ref 17–32)
CO2 SERPL-SCNC: 30 MMOL/L — SIGNIFICANT CHANGE UP (ref 17–32)
CO2 SERPL-SCNC: 30 MMOL/L — SIGNIFICANT CHANGE UP (ref 17–32)
CO2 SERPL-SCNC: 31 MMOL/L — SIGNIFICANT CHANGE UP (ref 17–32)
CO2 SERPL-SCNC: 32 MMOL/L — SIGNIFICANT CHANGE UP (ref 17–32)
CO2 SERPL-SCNC: 33 MMOL/L — HIGH (ref 17–32)
CO2 SERPL-SCNC: 34 MMOL/L — HIGH (ref 17–32)
COLOR SPEC: COLORLESS — SIGNIFICANT CHANGE UP
CREAT SERPL-MCNC: 0.6 MG/DL — LOW (ref 0.7–1.5)
CREAT SERPL-MCNC: 0.7 MG/DL — SIGNIFICANT CHANGE UP (ref 0.7–1.5)
CREAT SERPL-MCNC: 0.8 MG/DL — SIGNIFICANT CHANGE UP (ref 0.7–1.5)
CREAT SERPL-MCNC: 0.9 MG/DL — SIGNIFICANT CHANGE UP (ref 0.7–1.5)
CREAT SERPL-MCNC: 1 MG/DL — SIGNIFICANT CHANGE UP (ref 0.7–1.5)
CREAT SERPL-MCNC: 1 MG/DL — SIGNIFICANT CHANGE UP (ref 0.7–1.5)
CULTURE RESULTS: SIGNIFICANT CHANGE UP
D DIMER BLD IA.RAPID-MCNC: 628 NG/ML DDU — HIGH (ref 0–230)
DIFF PNL FLD: NEGATIVE — SIGNIFICANT CHANGE UP
EGFR: 54 ML/MIN/1.73M2 — LOW
EGFR: 54 ML/MIN/1.73M2 — LOW
EGFR: 61 ML/MIN/1.73M2 — SIGNIFICANT CHANGE UP
EGFR: 70 ML/MIN/1.73M2 — SIGNIFICANT CHANGE UP
EGFR: 83 ML/MIN/1.73M2 — SIGNIFICANT CHANGE UP
EGFR: 86 ML/MIN/1.73M2 — SIGNIFICANT CHANGE UP
EOSINOPHIL # BLD AUTO: 0 K/UL — SIGNIFICANT CHANGE UP (ref 0–0.7)
EOSINOPHIL # BLD AUTO: 0.01 K/UL — SIGNIFICANT CHANGE UP (ref 0–0.7)
EOSINOPHIL # BLD AUTO: 0.01 K/UL — SIGNIFICANT CHANGE UP (ref 0–0.7)
EOSINOPHIL # BLD AUTO: 0.04 K/UL — SIGNIFICANT CHANGE UP (ref 0–0.7)
EOSINOPHIL # BLD AUTO: 0.05 K/UL — SIGNIFICANT CHANGE UP (ref 0–0.7)
EOSINOPHIL # BLD AUTO: 0.09 K/UL — SIGNIFICANT CHANGE UP (ref 0–0.7)
EOSINOPHIL # BLD AUTO: 0.12 K/UL — SIGNIFICANT CHANGE UP (ref 0–0.7)
EOSINOPHIL # BLD AUTO: 0.13 K/UL — SIGNIFICANT CHANGE UP (ref 0–0.7)
EOSINOPHIL # BLD AUTO: 0.16 K/UL — SIGNIFICANT CHANGE UP (ref 0–0.7)
EOSINOPHIL # BLD AUTO: 0.17 K/UL — SIGNIFICANT CHANGE UP (ref 0–0.7)
EOSINOPHIL # BLD AUTO: 0.25 K/UL — SIGNIFICANT CHANGE UP (ref 0–0.7)
EOSINOPHIL # BLD AUTO: 0.27 K/UL — SIGNIFICANT CHANGE UP (ref 0–0.7)
EOSINOPHIL # BLD AUTO: 0.28 K/UL — SIGNIFICANT CHANGE UP (ref 0–0.7)
EOSINOPHIL # BLD AUTO: 0.33 K/UL — SIGNIFICANT CHANGE UP (ref 0–0.7)
EOSINOPHIL NFR BLD AUTO: 0 % — SIGNIFICANT CHANGE UP (ref 0–8)
EOSINOPHIL NFR BLD AUTO: 0.1 % — SIGNIFICANT CHANGE UP (ref 0–8)
EOSINOPHIL NFR BLD AUTO: 0.1 % — SIGNIFICANT CHANGE UP (ref 0–8)
EOSINOPHIL NFR BLD AUTO: 0.4 % — SIGNIFICANT CHANGE UP (ref 0–8)
EOSINOPHIL NFR BLD AUTO: 0.5 % — SIGNIFICANT CHANGE UP (ref 0–8)
EOSINOPHIL NFR BLD AUTO: 0.9 % — SIGNIFICANT CHANGE UP (ref 0–8)
EOSINOPHIL NFR BLD AUTO: 1.7 % — SIGNIFICANT CHANGE UP (ref 0–8)
EOSINOPHIL NFR BLD AUTO: 2 % — SIGNIFICANT CHANGE UP (ref 0–8)
EOSINOPHIL NFR BLD AUTO: 2.3 % — SIGNIFICANT CHANGE UP (ref 0–8)
EOSINOPHIL NFR BLD AUTO: 3.1 % — SIGNIFICANT CHANGE UP (ref 0–8)
EOSINOPHIL NFR BLD AUTO: 3.1 % — SIGNIFICANT CHANGE UP (ref 0–8)
EOSINOPHIL NFR BLD AUTO: 4.1 % — SIGNIFICANT CHANGE UP (ref 0–8)
ESTIMATED AVERAGE GLUCOSE: 94 MG/DL — SIGNIFICANT CHANGE UP (ref 68–114)
GAS PNL BLDA: SIGNIFICANT CHANGE UP
GLUCOSE BLDC GLUCOMTR-MCNC: 105 MG/DL — HIGH (ref 70–99)
GLUCOSE BLDC GLUCOMTR-MCNC: 110 MG/DL — HIGH (ref 70–99)
GLUCOSE BLDC GLUCOMTR-MCNC: 113 MG/DL — HIGH (ref 70–99)
GLUCOSE BLDC GLUCOMTR-MCNC: 113 MG/DL — HIGH (ref 70–99)
GLUCOSE BLDC GLUCOMTR-MCNC: 114 MG/DL — HIGH (ref 70–99)
GLUCOSE BLDC GLUCOMTR-MCNC: 120 MG/DL — HIGH (ref 70–99)
GLUCOSE BLDC GLUCOMTR-MCNC: 121 MG/DL — HIGH (ref 70–99)
GLUCOSE BLDC GLUCOMTR-MCNC: 121 MG/DL — HIGH (ref 70–99)
GLUCOSE BLDC GLUCOMTR-MCNC: 124 MG/DL — HIGH (ref 70–99)
GLUCOSE BLDC GLUCOMTR-MCNC: 125 MG/DL — HIGH (ref 70–99)
GLUCOSE BLDC GLUCOMTR-MCNC: 128 MG/DL — HIGH (ref 70–99)
GLUCOSE BLDC GLUCOMTR-MCNC: 129 MG/DL — HIGH (ref 70–99)
GLUCOSE BLDC GLUCOMTR-MCNC: 132 MG/DL — HIGH (ref 70–99)
GLUCOSE BLDC GLUCOMTR-MCNC: 135 MG/DL — HIGH (ref 70–99)
GLUCOSE BLDC GLUCOMTR-MCNC: 140 MG/DL — HIGH (ref 70–99)
GLUCOSE BLDC GLUCOMTR-MCNC: 141 MG/DL — HIGH (ref 70–99)
GLUCOSE BLDC GLUCOMTR-MCNC: 141 MG/DL — HIGH (ref 70–99)
GLUCOSE BLDC GLUCOMTR-MCNC: 142 MG/DL — HIGH (ref 70–99)
GLUCOSE BLDC GLUCOMTR-MCNC: 143 MG/DL — HIGH (ref 70–99)
GLUCOSE BLDC GLUCOMTR-MCNC: 144 MG/DL — HIGH (ref 70–99)
GLUCOSE BLDC GLUCOMTR-MCNC: 144 MG/DL — HIGH (ref 70–99)
GLUCOSE BLDC GLUCOMTR-MCNC: 145 MG/DL — HIGH (ref 70–99)
GLUCOSE BLDC GLUCOMTR-MCNC: 146 MG/DL — HIGH (ref 70–99)
GLUCOSE BLDC GLUCOMTR-MCNC: 148 MG/DL — HIGH (ref 70–99)
GLUCOSE BLDC GLUCOMTR-MCNC: 151 MG/DL — HIGH (ref 70–99)
GLUCOSE BLDC GLUCOMTR-MCNC: 152 MG/DL — HIGH (ref 70–99)
GLUCOSE BLDC GLUCOMTR-MCNC: 152 MG/DL — HIGH (ref 70–99)
GLUCOSE BLDC GLUCOMTR-MCNC: 155 MG/DL — HIGH (ref 70–99)
GLUCOSE BLDC GLUCOMTR-MCNC: 156 MG/DL — HIGH (ref 70–99)
GLUCOSE BLDC GLUCOMTR-MCNC: 157 MG/DL — HIGH (ref 70–99)
GLUCOSE BLDC GLUCOMTR-MCNC: 159 MG/DL — HIGH (ref 70–99)
GLUCOSE BLDC GLUCOMTR-MCNC: 164 MG/DL — HIGH (ref 70–99)
GLUCOSE BLDC GLUCOMTR-MCNC: 164 MG/DL — HIGH (ref 70–99)
GLUCOSE BLDC GLUCOMTR-MCNC: 165 MG/DL — HIGH (ref 70–99)
GLUCOSE BLDC GLUCOMTR-MCNC: 165 MG/DL — HIGH (ref 70–99)
GLUCOSE BLDC GLUCOMTR-MCNC: 166 MG/DL — HIGH (ref 70–99)
GLUCOSE BLDC GLUCOMTR-MCNC: 167 MG/DL — HIGH (ref 70–99)
GLUCOSE BLDC GLUCOMTR-MCNC: 169 MG/DL — HIGH (ref 70–99)
GLUCOSE BLDC GLUCOMTR-MCNC: 170 MG/DL — HIGH (ref 70–99)
GLUCOSE BLDC GLUCOMTR-MCNC: 171 MG/DL — HIGH (ref 70–99)
GLUCOSE BLDC GLUCOMTR-MCNC: 173 MG/DL — HIGH (ref 70–99)
GLUCOSE BLDC GLUCOMTR-MCNC: 173 MG/DL — HIGH (ref 70–99)
GLUCOSE BLDC GLUCOMTR-MCNC: 174 MG/DL — HIGH (ref 70–99)
GLUCOSE BLDC GLUCOMTR-MCNC: 175 MG/DL — HIGH (ref 70–99)
GLUCOSE BLDC GLUCOMTR-MCNC: 175 MG/DL — HIGH (ref 70–99)
GLUCOSE BLDC GLUCOMTR-MCNC: 176 MG/DL — HIGH (ref 70–99)
GLUCOSE BLDC GLUCOMTR-MCNC: 182 MG/DL — HIGH (ref 70–99)
GLUCOSE BLDC GLUCOMTR-MCNC: 183 MG/DL — HIGH (ref 70–99)
GLUCOSE BLDC GLUCOMTR-MCNC: 185 MG/DL — HIGH (ref 70–99)
GLUCOSE BLDC GLUCOMTR-MCNC: 187 MG/DL — HIGH (ref 70–99)
GLUCOSE BLDC GLUCOMTR-MCNC: 187 MG/DL — HIGH (ref 70–99)
GLUCOSE BLDC GLUCOMTR-MCNC: 190 MG/DL — HIGH (ref 70–99)
GLUCOSE BLDC GLUCOMTR-MCNC: 199 MG/DL — HIGH (ref 70–99)
GLUCOSE BLDC GLUCOMTR-MCNC: 200 MG/DL — HIGH (ref 70–99)
GLUCOSE BLDC GLUCOMTR-MCNC: 217 MG/DL — HIGH (ref 70–99)
GLUCOSE BLDC GLUCOMTR-MCNC: 225 MG/DL — HIGH (ref 70–99)
GLUCOSE BLDC GLUCOMTR-MCNC: 250 MG/DL — HIGH (ref 70–99)
GLUCOSE BLDC GLUCOMTR-MCNC: 76 MG/DL — SIGNIFICANT CHANGE UP (ref 70–99)
GLUCOSE BLDC GLUCOMTR-MCNC: 84 MG/DL — SIGNIFICANT CHANGE UP (ref 70–99)
GLUCOSE BLDC GLUCOMTR-MCNC: 85 MG/DL — SIGNIFICANT CHANGE UP (ref 70–99)
GLUCOSE BLDC GLUCOMTR-MCNC: 90 MG/DL — SIGNIFICANT CHANGE UP (ref 70–99)
GLUCOSE BLDC GLUCOMTR-MCNC: 98 MG/DL — SIGNIFICANT CHANGE UP (ref 70–99)
GLUCOSE SERPL-MCNC: 124 MG/DL — HIGH (ref 70–99)
GLUCOSE SERPL-MCNC: 129 MG/DL — HIGH (ref 70–99)
GLUCOSE SERPL-MCNC: 132 MG/DL — HIGH (ref 70–99)
GLUCOSE SERPL-MCNC: 132 MG/DL — HIGH (ref 70–99)
GLUCOSE SERPL-MCNC: 133 MG/DL — HIGH (ref 70–99)
GLUCOSE SERPL-MCNC: 141 MG/DL — HIGH (ref 70–99)
GLUCOSE SERPL-MCNC: 143 MG/DL — HIGH (ref 70–99)
GLUCOSE SERPL-MCNC: 144 MG/DL — HIGH (ref 70–99)
GLUCOSE SERPL-MCNC: 145 MG/DL — HIGH (ref 70–99)
GLUCOSE SERPL-MCNC: 146 MG/DL — HIGH (ref 70–99)
GLUCOSE SERPL-MCNC: 162 MG/DL — HIGH (ref 70–99)
GLUCOSE SERPL-MCNC: 163 MG/DL — HIGH (ref 70–99)
GLUCOSE SERPL-MCNC: 167 MG/DL — HIGH (ref 70–99)
GLUCOSE SERPL-MCNC: 169 MG/DL — HIGH (ref 70–99)
GLUCOSE SERPL-MCNC: 174 MG/DL — HIGH (ref 70–99)
GLUCOSE SERPL-MCNC: 183 MG/DL — HIGH (ref 70–99)
GLUCOSE SERPL-MCNC: 194 MG/DL — HIGH (ref 70–99)
GLUCOSE SERPL-MCNC: 199 MG/DL — HIGH (ref 70–99)
GLUCOSE SERPL-MCNC: 224 MG/DL — HIGH (ref 70–99)
GLUCOSE SERPL-MCNC: 245 MG/DL — HIGH (ref 70–99)
GLUCOSE SERPL-MCNC: 85 MG/DL — SIGNIFICANT CHANGE UP (ref 70–99)
GLUCOSE UR QL: ABNORMAL
HCO3 BLDA-SCNC: 22 MMOL/L — SIGNIFICANT CHANGE UP (ref 21–28)
HCO3 BLDA-SCNC: 23 MMOL/L — SIGNIFICANT CHANGE UP (ref 21–28)
HCO3 BLDA-SCNC: 24 MMOL/L — SIGNIFICANT CHANGE UP (ref 21–28)
HCO3 BLDA-SCNC: 28 MMOL/L — SIGNIFICANT CHANGE UP (ref 21–28)
HCO3 BLDA-SCNC: 31 MMOL/L — HIGH (ref 21–28)
HCO3 BLDA-SCNC: 31 MMOL/L — HIGH (ref 21–28)
HCO3 BLDA-SCNC: 32 MMOL/L — HIGH (ref 21–28)
HCO3 BLDA-SCNC: 32 MMOL/L — HIGH (ref 21–28)
HCO3 BLDA-SCNC: 34 MMOL/L — HIGH (ref 21–28)
HCO3 BLDA-SCNC: 35 MMOL/L — HIGH (ref 21–28)
HCT VFR BLD CALC: 27.2 % — LOW (ref 37–47)
HCT VFR BLD CALC: 27.5 % — LOW (ref 37–47)
HCT VFR BLD CALC: 28.2 % — LOW (ref 37–47)
HCT VFR BLD CALC: 28.5 % — LOW (ref 37–47)
HCT VFR BLD CALC: 28.5 % — LOW (ref 37–47)
HCT VFR BLD CALC: 29.5 % — LOW (ref 37–47)
HCT VFR BLD CALC: 30 % — LOW (ref 37–47)
HCT VFR BLD CALC: 30 % — LOW (ref 37–47)
HCT VFR BLD CALC: 30.5 % — LOW (ref 37–47)
HCT VFR BLD CALC: 30.6 % — LOW (ref 37–47)
HCT VFR BLD CALC: 30.6 % — LOW (ref 37–47)
HCT VFR BLD CALC: 30.9 % — LOW (ref 37–47)
HCT VFR BLD CALC: 31 % — LOW (ref 37–47)
HCT VFR BLD CALC: 32.7 % — LOW (ref 37–47)
HCT VFR BLD CALC: 33.5 % — LOW (ref 37–47)
HCT VFR BLD CALC: 34.1 % — LOW (ref 37–47)
HCT VFR BLD CALC: 34.9 % — LOW (ref 37–47)
HCT VFR BLD CALC: 35.3 % — LOW (ref 37–47)
HCT VFR BLD CALC: 35.6 % — LOW (ref 37–47)
HCT VFR BLD CALC: 43.4 % — SIGNIFICANT CHANGE UP (ref 37–47)
HGB BLD-MCNC: 10.1 G/DL — LOW (ref 12–16)
HGB BLD-MCNC: 10.1 G/DL — LOW (ref 12–16)
HGB BLD-MCNC: 10.2 G/DL — LOW (ref 12–16)
HGB BLD-MCNC: 10.5 G/DL — LOW (ref 12–16)
HGB BLD-MCNC: 10.5 G/DL — LOW (ref 12–16)
HGB BLD-MCNC: 11 G/DL — LOW (ref 12–16)
HGB BLD-MCNC: 11.3 G/DL — LOW (ref 12–16)
HGB BLD-MCNC: 11.4 G/DL — LOW (ref 12–16)
HGB BLD-MCNC: 11.6 G/DL — LOW (ref 12–16)
HGB BLD-MCNC: 11.6 G/DL — LOW (ref 12–16)
HGB BLD-MCNC: 13.2 G/DL — SIGNIFICANT CHANGE UP (ref 12–16)
HGB BLD-MCNC: 8.7 G/DL — LOW (ref 12–16)
HGB BLD-MCNC: 8.8 G/DL — LOW (ref 12–16)
HGB BLD-MCNC: 8.9 G/DL — LOW (ref 12–16)
HGB BLD-MCNC: 9.1 G/DL — LOW (ref 12–16)
HGB BLD-MCNC: 9.2 G/DL — LOW (ref 12–16)
HGB BLD-MCNC: 9.5 G/DL — LOW (ref 12–16)
HGB BLD-MCNC: 9.5 G/DL — LOW (ref 12–16)
HGB BLD-MCNC: 9.6 G/DL — LOW (ref 12–16)
HGB BLD-MCNC: 9.7 G/DL — LOW (ref 12–16)
HOROWITZ INDEX BLDA+IHG-RTO: 100 — SIGNIFICANT CHANGE UP
HOROWITZ INDEX BLDA+IHG-RTO: 35 — SIGNIFICANT CHANGE UP
HOROWITZ INDEX BLDA+IHG-RTO: 40 — SIGNIFICANT CHANGE UP
IMM GRANULOCYTES NFR BLD AUTO: 0.3 % — SIGNIFICANT CHANGE UP (ref 0.1–0.3)
IMM GRANULOCYTES NFR BLD AUTO: 0.3 % — SIGNIFICANT CHANGE UP (ref 0.1–0.3)
IMM GRANULOCYTES NFR BLD AUTO: 0.4 % — HIGH (ref 0.1–0.3)
IMM GRANULOCYTES NFR BLD AUTO: 0.5 % — HIGH (ref 0.1–0.3)
IMM GRANULOCYTES NFR BLD AUTO: 0.5 % — HIGH (ref 0.1–0.3)
IMM GRANULOCYTES NFR BLD AUTO: 0.6 % — HIGH (ref 0.1–0.3)
IMM GRANULOCYTES NFR BLD AUTO: 0.7 % — HIGH (ref 0.1–0.3)
IMM GRANULOCYTES NFR BLD AUTO: 0.9 % — HIGH (ref 0.1–0.3)
IMM GRANULOCYTES NFR BLD AUTO: 1.2 % — HIGH (ref 0.1–0.3)
KETONES UR-MCNC: NEGATIVE — SIGNIFICANT CHANGE UP
LACTATE SERPL-SCNC: 1.5 MMOL/L — SIGNIFICANT CHANGE UP (ref 0.7–2)
LEUKOCYTE ESTERASE UR-ACNC: NEGATIVE — SIGNIFICANT CHANGE UP
LYMPHOCYTES # BLD AUTO: 0.07 K/UL — LOW (ref 1.2–3.4)
LYMPHOCYTES # BLD AUTO: 0.35 K/UL — LOW (ref 1.2–3.4)
LYMPHOCYTES # BLD AUTO: 0.38 K/UL — LOW (ref 1.2–3.4)
LYMPHOCYTES # BLD AUTO: 0.43 K/UL — LOW (ref 1.2–3.4)
LYMPHOCYTES # BLD AUTO: 0.47 K/UL — LOW (ref 1.2–3.4)
LYMPHOCYTES # BLD AUTO: 0.48 K/UL — LOW (ref 1.2–3.4)
LYMPHOCYTES # BLD AUTO: 0.48 K/UL — LOW (ref 1.2–3.4)
LYMPHOCYTES # BLD AUTO: 0.59 K/UL — LOW (ref 1.2–3.4)
LYMPHOCYTES # BLD AUTO: 0.61 K/UL — LOW (ref 1.2–3.4)
LYMPHOCYTES # BLD AUTO: 0.68 K/UL — LOW (ref 1.2–3.4)
LYMPHOCYTES # BLD AUTO: 0.74 K/UL — LOW (ref 1.2–3.4)
LYMPHOCYTES # BLD AUTO: 0.9 % — LOW (ref 20.5–51.1)
LYMPHOCYTES # BLD AUTO: 1.1 K/UL — LOW (ref 1.2–3.4)
LYMPHOCYTES # BLD AUTO: 10.2 % — LOW (ref 20.5–51.1)
LYMPHOCYTES # BLD AUTO: 10.4 % — LOW (ref 20.5–51.1)
LYMPHOCYTES # BLD AUTO: 13.4 % — LOW (ref 20.5–51.1)
LYMPHOCYTES # BLD AUTO: 3.7 % — LOW (ref 20.5–51.1)
LYMPHOCYTES # BLD AUTO: 4 % — LOW (ref 20.5–51.1)
LYMPHOCYTES # BLD AUTO: 4.5 % — LOW (ref 20.5–51.1)
LYMPHOCYTES # BLD AUTO: 4.5 % — LOW (ref 20.5–51.1)
LYMPHOCYTES # BLD AUTO: 5 % — LOW (ref 20.5–51.1)
LYMPHOCYTES # BLD AUTO: 7 % — LOW (ref 20.5–51.1)
LYMPHOCYTES # BLD AUTO: 7.4 % — LOW (ref 20.5–51.1)
LYMPHOCYTES # BLD AUTO: 7.5 % — LOW (ref 20.5–51.1)
LYMPHOCYTES # BLD AUTO: 7.5 % — LOW (ref 20.5–51.1)
LYMPHOCYTES # BLD AUTO: 8.9 % — LOW (ref 20.5–51.1)
MAGNESIUM SERPL-MCNC: 1.3 MG/DL — LOW (ref 1.8–2.4)
MAGNESIUM SERPL-MCNC: 1.6 MG/DL — LOW (ref 1.8–2.4)
MAGNESIUM SERPL-MCNC: 1.7 MG/DL — LOW (ref 1.8–2.4)
MAGNESIUM SERPL-MCNC: 1.8 MG/DL — SIGNIFICANT CHANGE UP (ref 1.8–2.4)
MAGNESIUM SERPL-MCNC: 1.9 MG/DL — SIGNIFICANT CHANGE UP (ref 1.8–2.4)
MAGNESIUM SERPL-MCNC: 2 MG/DL — SIGNIFICANT CHANGE UP (ref 1.8–2.4)
MAGNESIUM SERPL-MCNC: 2.1 MG/DL — SIGNIFICANT CHANGE UP (ref 1.8–2.4)
MAGNESIUM SERPL-MCNC: 2.2 MG/DL — SIGNIFICANT CHANGE UP (ref 1.8–2.4)
MAGNESIUM SERPL-MCNC: 2.2 MG/DL — SIGNIFICANT CHANGE UP (ref 1.8–2.4)
MAGNESIUM SERPL-MCNC: 2.4 MG/DL — SIGNIFICANT CHANGE UP (ref 1.8–2.4)
MAGNESIUM SERPL-MCNC: 2.7 MG/DL — HIGH (ref 1.8–2.4)
MANUAL SMEAR VERIFICATION: SIGNIFICANT CHANGE UP
MCHC RBC-ENTMCNC: 28.8 PG — SIGNIFICANT CHANGE UP (ref 27–31)
MCHC RBC-ENTMCNC: 28.9 PG — SIGNIFICANT CHANGE UP (ref 27–31)
MCHC RBC-ENTMCNC: 29.2 PG — SIGNIFICANT CHANGE UP (ref 27–31)
MCHC RBC-ENTMCNC: 29.2 PG — SIGNIFICANT CHANGE UP (ref 27–31)
MCHC RBC-ENTMCNC: 29.3 PG — SIGNIFICANT CHANGE UP (ref 27–31)
MCHC RBC-ENTMCNC: 29.4 PG — SIGNIFICANT CHANGE UP (ref 27–31)
MCHC RBC-ENTMCNC: 29.5 PG — SIGNIFICANT CHANGE UP (ref 27–31)
MCHC RBC-ENTMCNC: 29.6 PG — SIGNIFICANT CHANGE UP (ref 27–31)
MCHC RBC-ENTMCNC: 29.6 PG — SIGNIFICANT CHANGE UP (ref 27–31)
MCHC RBC-ENTMCNC: 29.7 PG — SIGNIFICANT CHANGE UP (ref 27–31)
MCHC RBC-ENTMCNC: 29.8 PG — SIGNIFICANT CHANGE UP (ref 27–31)
MCHC RBC-ENTMCNC: 29.9 PG — SIGNIFICANT CHANGE UP (ref 27–31)
MCHC RBC-ENTMCNC: 30 PG — SIGNIFICANT CHANGE UP (ref 27–31)
MCHC RBC-ENTMCNC: 30.4 G/DL — LOW (ref 32–37)
MCHC RBC-ENTMCNC: 31.2 G/DL — LOW (ref 32–37)
MCHC RBC-ENTMCNC: 31.4 G/DL — LOW (ref 32–37)
MCHC RBC-ENTMCNC: 31.6 G/DL — LOW (ref 32–37)
MCHC RBC-ENTMCNC: 31.7 G/DL — LOW (ref 32–37)
MCHC RBC-ENTMCNC: 31.7 G/DL — LOW (ref 32–37)
MCHC RBC-ENTMCNC: 32.1 G/DL — SIGNIFICANT CHANGE UP (ref 32–37)
MCHC RBC-ENTMCNC: 32.3 G/DL — SIGNIFICANT CHANGE UP (ref 32–37)
MCHC RBC-ENTMCNC: 32.4 G/DL — SIGNIFICANT CHANGE UP (ref 32–37)
MCHC RBC-ENTMCNC: 32.6 G/DL — SIGNIFICANT CHANGE UP (ref 32–37)
MCHC RBC-ENTMCNC: 32.8 G/DL — SIGNIFICANT CHANGE UP (ref 32–37)
MCHC RBC-ENTMCNC: 32.9 G/DL — SIGNIFICANT CHANGE UP (ref 32–37)
MCHC RBC-ENTMCNC: 33 G/DL — SIGNIFICANT CHANGE UP (ref 32–37)
MCHC RBC-ENTMCNC: 33 G/DL — SIGNIFICANT CHANGE UP (ref 32–37)
MCHC RBC-ENTMCNC: 33.1 G/DL — SIGNIFICANT CHANGE UP (ref 32–37)
MCHC RBC-ENTMCNC: 33.1 G/DL — SIGNIFICANT CHANGE UP (ref 32–37)
MCHC RBC-ENTMCNC: 33.2 G/DL — SIGNIFICANT CHANGE UP (ref 32–37)
MCHC RBC-ENTMCNC: 33.9 G/DL — SIGNIFICANT CHANGE UP (ref 32–37)
MCV RBC AUTO: 87 FL — SIGNIFICANT CHANGE UP (ref 81–99)
MCV RBC AUTO: 88.1 FL — SIGNIFICANT CHANGE UP (ref 81–99)
MCV RBC AUTO: 88.3 FL — SIGNIFICANT CHANGE UP (ref 81–99)
MCV RBC AUTO: 89.1 FL — SIGNIFICANT CHANGE UP (ref 81–99)
MCV RBC AUTO: 89.5 FL — SIGNIFICANT CHANGE UP (ref 81–99)
MCV RBC AUTO: 89.6 FL — SIGNIFICANT CHANGE UP (ref 81–99)
MCV RBC AUTO: 89.7 FL — SIGNIFICANT CHANGE UP (ref 81–99)
MCV RBC AUTO: 89.8 FL — SIGNIFICANT CHANGE UP (ref 81–99)
MCV RBC AUTO: 90.4 FL — SIGNIFICANT CHANGE UP (ref 81–99)
MCV RBC AUTO: 90.5 FL — SIGNIFICANT CHANGE UP (ref 81–99)
MCV RBC AUTO: 91 FL — SIGNIFICANT CHANGE UP (ref 81–99)
MCV RBC AUTO: 91.1 FL — SIGNIFICANT CHANGE UP (ref 81–99)
MCV RBC AUTO: 91.1 FL — SIGNIFICANT CHANGE UP (ref 81–99)
MCV RBC AUTO: 91.6 FL — SIGNIFICANT CHANGE UP (ref 81–99)
MCV RBC AUTO: 92.3 FL — SIGNIFICANT CHANGE UP (ref 81–99)
MCV RBC AUTO: 93.5 FL — SIGNIFICANT CHANGE UP (ref 81–99)
MCV RBC AUTO: 93.6 FL — SIGNIFICANT CHANGE UP (ref 81–99)
MCV RBC AUTO: 93.8 FL — SIGNIFICANT CHANGE UP (ref 81–99)
MCV RBC AUTO: 94 FL — SIGNIFICANT CHANGE UP (ref 81–99)
MCV RBC AUTO: 96.9 FL — SIGNIFICANT CHANGE UP (ref 81–99)
MICROCYTES BLD QL: SLIGHT — SIGNIFICANT CHANGE UP
MONOCYTES # BLD AUTO: 0.13 K/UL — SIGNIFICANT CHANGE UP (ref 0.1–0.6)
MONOCYTES # BLD AUTO: 0.43 K/UL — SIGNIFICANT CHANGE UP (ref 0.1–0.6)
MONOCYTES # BLD AUTO: 0.43 K/UL — SIGNIFICANT CHANGE UP (ref 0.1–0.6)
MONOCYTES # BLD AUTO: 0.45 K/UL — SIGNIFICANT CHANGE UP (ref 0.1–0.6)
MONOCYTES # BLD AUTO: 0.51 K/UL — SIGNIFICANT CHANGE UP (ref 0.1–0.6)
MONOCYTES # BLD AUTO: 0.56 K/UL — SIGNIFICANT CHANGE UP (ref 0.1–0.6)
MONOCYTES # BLD AUTO: 0.56 K/UL — SIGNIFICANT CHANGE UP (ref 0.1–0.6)
MONOCYTES # BLD AUTO: 0.58 K/UL — SIGNIFICANT CHANGE UP (ref 0.1–0.6)
MONOCYTES # BLD AUTO: 0.66 K/UL — HIGH (ref 0.1–0.6)
MONOCYTES # BLD AUTO: 0.67 K/UL — HIGH (ref 0.1–0.6)
MONOCYTES # BLD AUTO: 0.67 K/UL — HIGH (ref 0.1–0.6)
MONOCYTES # BLD AUTO: 0.68 K/UL — HIGH (ref 0.1–0.6)
MONOCYTES # BLD AUTO: 0.72 K/UL — HIGH (ref 0.1–0.6)
MONOCYTES # BLD AUTO: 0.92 K/UL — HIGH (ref 0.1–0.6)
MONOCYTES NFR BLD AUTO: 1.7 % — SIGNIFICANT CHANGE UP (ref 1.7–9.3)
MONOCYTES NFR BLD AUTO: 10.1 % — HIGH (ref 1.7–9.3)
MONOCYTES NFR BLD AUTO: 10.1 % — HIGH (ref 1.7–9.3)
MONOCYTES NFR BLD AUTO: 2.9 % — SIGNIFICANT CHANGE UP (ref 1.7–9.3)
MONOCYTES NFR BLD AUTO: 6.6 % — SIGNIFICANT CHANGE UP (ref 1.7–9.3)
MONOCYTES NFR BLD AUTO: 6.7 % — SIGNIFICANT CHANGE UP (ref 1.7–9.3)
MONOCYTES NFR BLD AUTO: 6.9 % — SIGNIFICANT CHANGE UP (ref 1.7–9.3)
MONOCYTES NFR BLD AUTO: 6.9 % — SIGNIFICANT CHANGE UP (ref 1.7–9.3)
MONOCYTES NFR BLD AUTO: 7 % — SIGNIFICANT CHANGE UP (ref 1.7–9.3)
MONOCYTES NFR BLD AUTO: 7.1 % — SIGNIFICANT CHANGE UP (ref 1.7–9.3)
MONOCYTES NFR BLD AUTO: 7.5 % — SIGNIFICANT CHANGE UP (ref 1.7–9.3)
MONOCYTES NFR BLD AUTO: 7.8 % — SIGNIFICANT CHANGE UP (ref 1.7–9.3)
MONOCYTES NFR BLD AUTO: 8.1 % — SIGNIFICANT CHANGE UP (ref 1.7–9.3)
MONOCYTES NFR BLD AUTO: 8.7 % — SIGNIFICANT CHANGE UP (ref 1.7–9.3)
MRSA PCR RESULT.: POSITIVE
NEUTROPHILS # BLD AUTO: 13.21 K/UL — HIGH (ref 1.4–6.5)
NEUTROPHILS # BLD AUTO: 4.01 K/UL — SIGNIFICANT CHANGE UP (ref 1.4–6.5)
NEUTROPHILS # BLD AUTO: 4.39 K/UL — SIGNIFICANT CHANGE UP (ref 1.4–6.5)
NEUTROPHILS # BLD AUTO: 5.06 K/UL — SIGNIFICANT CHANGE UP (ref 1.4–6.5)
NEUTROPHILS # BLD AUTO: 5.06 K/UL — SIGNIFICANT CHANGE UP (ref 1.4–6.5)
NEUTROPHILS # BLD AUTO: 5.35 K/UL — SIGNIFICANT CHANGE UP (ref 1.4–6.5)
NEUTROPHILS # BLD AUTO: 6.4 K/UL — SIGNIFICANT CHANGE UP (ref 1.4–6.5)
NEUTROPHILS # BLD AUTO: 6.58 K/UL — HIGH (ref 1.4–6.5)
NEUTROPHILS # BLD AUTO: 7.21 K/UL — HIGH (ref 1.4–6.5)
NEUTROPHILS # BLD AUTO: 7.6 K/UL — HIGH (ref 1.4–6.5)
NEUTROPHILS # BLD AUTO: 7.99 K/UL — HIGH (ref 1.4–6.5)
NEUTROPHILS # BLD AUTO: 8.99 K/UL — HIGH (ref 1.4–6.5)
NEUTROPHILS # BLD AUTO: 9.07 K/UL — HIGH (ref 1.4–6.5)
NEUTROPHILS # BLD AUTO: 9.38 K/UL — HIGH (ref 1.4–6.5)
NEUTROPHILS NFR BLD AUTO: 72.5 % — SIGNIFICANT CHANGE UP (ref 42.2–75.2)
NEUTROPHILS NFR BLD AUTO: 76 % — HIGH (ref 42.2–75.2)
NEUTROPHILS NFR BLD AUTO: 77.4 % — HIGH (ref 42.2–75.2)
NEUTROPHILS NFR BLD AUTO: 78.3 % — HIGH (ref 42.2–75.2)
NEUTROPHILS NFR BLD AUTO: 78.5 % — HIGH (ref 42.2–75.2)
NEUTROPHILS NFR BLD AUTO: 81.3 % — HIGH (ref 42.2–75.2)
NEUTROPHILS NFR BLD AUTO: 83 % — HIGH (ref 42.2–75.2)
NEUTROPHILS NFR BLD AUTO: 85.2 % — HIGH (ref 42.2–75.2)
NEUTROPHILS NFR BLD AUTO: 85.7 % — HIGH (ref 42.2–75.2)
NEUTROPHILS NFR BLD AUTO: 87.3 % — HIGH (ref 42.2–75.2)
NEUTROPHILS NFR BLD AUTO: 87.4 % — HIGH (ref 42.2–75.2)
NEUTROPHILS NFR BLD AUTO: 87.9 % — HIGH (ref 42.2–75.2)
NEUTROPHILS NFR BLD AUTO: 88.2 % — HIGH (ref 42.2–75.2)
NEUTROPHILS NFR BLD AUTO: 97.4 % — HIGH (ref 42.2–75.2)
NITRITE UR-MCNC: NEGATIVE — SIGNIFICANT CHANGE UP
NRBC # BLD: 0 /100 WBCS — SIGNIFICANT CHANGE UP (ref 0–0)
OVALOCYTES BLD QL SMEAR: SLIGHT — SIGNIFICANT CHANGE UP
PCO2 BLDA: 27 MMHG — SIGNIFICANT CHANGE UP (ref 25–48)
PCO2 BLDA: 32 MMHG — SIGNIFICANT CHANGE UP (ref 25–48)
PCO2 BLDA: 37 MMHG — SIGNIFICANT CHANGE UP (ref 25–48)
PCO2 BLDA: 38 MMHG — SIGNIFICANT CHANGE UP (ref 25–48)
PCO2 BLDA: 41 MMHG — SIGNIFICANT CHANGE UP (ref 25–48)
PCO2 BLDA: 41 MMHG — SIGNIFICANT CHANGE UP (ref 25–48)
PCO2 BLDA: 42 MMHG — SIGNIFICANT CHANGE UP (ref 25–48)
PCO2 BLDA: 45 MMHG — SIGNIFICANT CHANGE UP (ref 25–48)
PCO2 BLDA: 52 MMHG — HIGH (ref 25–48)
PCO2 BLDA: 54 MMHG — HIGH (ref 25–48)
PCO2 BLDA: 54 MMHG — HIGH (ref 25–48)
PCO2 BLDA: 60 MMHG — HIGH (ref 25–48)
PCO2 BLDA: 77 MMHG — CRITICAL HIGH (ref 25–48)
PCO2 BLDA: 93 MMHG — CRITICAL HIGH (ref 25–48)
PH BLDA: 7.09 — CRITICAL LOW (ref 7.35–7.45)
PH BLDA: 7.23 — LOW (ref 7.35–7.45)
PH BLDA: 7.32 — LOW (ref 7.35–7.45)
PH BLDA: 7.36 — SIGNIFICANT CHANGE UP (ref 7.35–7.45)
PH BLDA: 7.37 — SIGNIFICANT CHANGE UP (ref 7.35–7.45)
PH BLDA: 7.41 — SIGNIFICANT CHANGE UP (ref 7.35–7.45)
PH BLDA: 7.42 — SIGNIFICANT CHANGE UP (ref 7.35–7.45)
PH BLDA: 7.44 — SIGNIFICANT CHANGE UP (ref 7.35–7.45)
PH BLDA: 7.45 — SIGNIFICANT CHANGE UP (ref 7.35–7.45)
PH BLDA: 7.47 — HIGH (ref 7.35–7.45)
PH BLDA: 7.51 — HIGH (ref 7.35–7.45)
PH BLDA: 7.51 — HIGH (ref 7.35–7.45)
PH BLDA: 7.54 — HIGH (ref 7.35–7.45)
PH BLDA: 7.55 — HIGH (ref 7.35–7.45)
PH UR: 7 — SIGNIFICANT CHANGE UP (ref 5–8)
PHOSPHATE SERPL-MCNC: 1.3 MG/DL — LOW (ref 2.1–4.9)
PHOSPHATE SERPL-MCNC: 2.5 MG/DL — SIGNIFICANT CHANGE UP (ref 2.1–4.9)
PHOSPHATE SERPL-MCNC: 3.3 MG/DL — SIGNIFICANT CHANGE UP (ref 2.1–4.9)
PHOSPHATE SERPL-MCNC: 3.3 MG/DL — SIGNIFICANT CHANGE UP (ref 2.1–4.9)
PHOSPHATE SERPL-MCNC: 3.4 MG/DL — SIGNIFICANT CHANGE UP (ref 2.1–4.9)
PLAT MORPH BLD: NORMAL — SIGNIFICANT CHANGE UP
PLATELET # BLD AUTO: 180 K/UL — SIGNIFICANT CHANGE UP (ref 130–400)
PLATELET # BLD AUTO: 220 K/UL — SIGNIFICANT CHANGE UP (ref 130–400)
PLATELET # BLD AUTO: 221 K/UL — SIGNIFICANT CHANGE UP (ref 130–400)
PLATELET # BLD AUTO: 226 K/UL — SIGNIFICANT CHANGE UP (ref 130–400)
PLATELET # BLD AUTO: 245 K/UL — SIGNIFICANT CHANGE UP (ref 130–400)
PLATELET # BLD AUTO: 260 K/UL — SIGNIFICANT CHANGE UP (ref 130–400)
PLATELET # BLD AUTO: 271 K/UL — SIGNIFICANT CHANGE UP (ref 130–400)
PLATELET # BLD AUTO: 274 K/UL — SIGNIFICANT CHANGE UP (ref 130–400)
PLATELET # BLD AUTO: 276 K/UL — SIGNIFICANT CHANGE UP (ref 130–400)
PLATELET # BLD AUTO: 280 K/UL — SIGNIFICANT CHANGE UP (ref 130–400)
PLATELET # BLD AUTO: 287 K/UL — SIGNIFICANT CHANGE UP (ref 130–400)
PLATELET # BLD AUTO: 290 K/UL — SIGNIFICANT CHANGE UP (ref 130–400)
PLATELET # BLD AUTO: 291 K/UL — SIGNIFICANT CHANGE UP (ref 130–400)
PLATELET # BLD AUTO: 297 K/UL — SIGNIFICANT CHANGE UP (ref 130–400)
PLATELET # BLD AUTO: 299 K/UL — SIGNIFICANT CHANGE UP (ref 130–400)
PLATELET # BLD AUTO: 320 K/UL — SIGNIFICANT CHANGE UP (ref 130–400)
PLATELET # BLD AUTO: 323 K/UL — SIGNIFICANT CHANGE UP (ref 130–400)
PLATELET # BLD AUTO: 340 K/UL — SIGNIFICANT CHANGE UP (ref 130–400)
PLATELET # BLD AUTO: 375 K/UL — SIGNIFICANT CHANGE UP (ref 130–400)
PLATELET # BLD AUTO: 403 K/UL — HIGH (ref 130–400)
PO2 BLDA: 103 MMHG — SIGNIFICANT CHANGE UP (ref 83–108)
PO2 BLDA: 115 MMHG — HIGH (ref 83–108)
PO2 BLDA: 117 MMHG — HIGH (ref 83–108)
PO2 BLDA: 122 MMHG — HIGH (ref 83–108)
PO2 BLDA: 158 MMHG — HIGH (ref 83–108)
PO2 BLDA: 172 MMHG — HIGH (ref 83–108)
PO2 BLDA: 177 MMHG — HIGH (ref 83–108)
PO2 BLDA: 180 MMHG — HIGH (ref 83–108)
PO2 BLDA: 195 MMHG — HIGH (ref 83–108)
PO2 BLDA: 196 MMHG — HIGH (ref 83–108)
PO2 BLDA: 35 MMHG — CRITICAL LOW (ref 83–108)
PO2 BLDA: 442 MMHG — HIGH (ref 83–108)
PO2 BLDA: 51 MMHG — LOW (ref 83–108)
PO2 BLDA: 63 MMHG — LOW (ref 83–108)
POIKILOCYTOSIS BLD QL AUTO: SIGNIFICANT CHANGE UP
POLYCHROMASIA BLD QL SMEAR: SLIGHT — SIGNIFICANT CHANGE UP
POTASSIUM SERPL-MCNC: 3.3 MMOL/L — LOW (ref 3.5–5)
POTASSIUM SERPL-MCNC: 3.4 MMOL/L — LOW (ref 3.5–5)
POTASSIUM SERPL-MCNC: 3.5 MMOL/L — SIGNIFICANT CHANGE UP (ref 3.5–5)
POTASSIUM SERPL-MCNC: 3.8 MMOL/L — SIGNIFICANT CHANGE UP (ref 3.5–5)
POTASSIUM SERPL-MCNC: 3.9 MMOL/L — SIGNIFICANT CHANGE UP (ref 3.5–5)
POTASSIUM SERPL-MCNC: 4 MMOL/L — SIGNIFICANT CHANGE UP (ref 3.5–5)
POTASSIUM SERPL-MCNC: 4.1 MMOL/L — SIGNIFICANT CHANGE UP (ref 3.5–5)
POTASSIUM SERPL-MCNC: 4.1 MMOL/L — SIGNIFICANT CHANGE UP (ref 3.5–5)
POTASSIUM SERPL-MCNC: 4.2 MMOL/L — SIGNIFICANT CHANGE UP (ref 3.5–5)
POTASSIUM SERPL-MCNC: 4.3 MMOL/L — SIGNIFICANT CHANGE UP (ref 3.5–5)
POTASSIUM SERPL-MCNC: 4.3 MMOL/L — SIGNIFICANT CHANGE UP (ref 3.5–5)
POTASSIUM SERPL-MCNC: 4.5 MMOL/L — SIGNIFICANT CHANGE UP (ref 3.5–5)
POTASSIUM SERPL-MCNC: 4.6 MMOL/L — SIGNIFICANT CHANGE UP (ref 3.5–5)
POTASSIUM SERPL-MCNC: 4.6 MMOL/L — SIGNIFICANT CHANGE UP (ref 3.5–5)
POTASSIUM SERPL-MCNC: 4.7 MMOL/L — SIGNIFICANT CHANGE UP (ref 3.5–5)
POTASSIUM SERPL-SCNC: 3.3 MMOL/L — LOW (ref 3.5–5)
POTASSIUM SERPL-SCNC: 3.4 MMOL/L — LOW (ref 3.5–5)
POTASSIUM SERPL-SCNC: 3.5 MMOL/L — SIGNIFICANT CHANGE UP (ref 3.5–5)
POTASSIUM SERPL-SCNC: 3.8 MMOL/L — SIGNIFICANT CHANGE UP (ref 3.5–5)
POTASSIUM SERPL-SCNC: 3.9 MMOL/L — SIGNIFICANT CHANGE UP (ref 3.5–5)
POTASSIUM SERPL-SCNC: 4 MMOL/L — SIGNIFICANT CHANGE UP (ref 3.5–5)
POTASSIUM SERPL-SCNC: 4.1 MMOL/L — SIGNIFICANT CHANGE UP (ref 3.5–5)
POTASSIUM SERPL-SCNC: 4.1 MMOL/L — SIGNIFICANT CHANGE UP (ref 3.5–5)
POTASSIUM SERPL-SCNC: 4.2 MMOL/L — SIGNIFICANT CHANGE UP (ref 3.5–5)
POTASSIUM SERPL-SCNC: 4.3 MMOL/L — SIGNIFICANT CHANGE UP (ref 3.5–5)
POTASSIUM SERPL-SCNC: 4.3 MMOL/L — SIGNIFICANT CHANGE UP (ref 3.5–5)
POTASSIUM SERPL-SCNC: 4.5 MMOL/L — SIGNIFICANT CHANGE UP (ref 3.5–5)
POTASSIUM SERPL-SCNC: 4.6 MMOL/L — SIGNIFICANT CHANGE UP (ref 3.5–5)
POTASSIUM SERPL-SCNC: 4.6 MMOL/L — SIGNIFICANT CHANGE UP (ref 3.5–5)
POTASSIUM SERPL-SCNC: 4.7 MMOL/L — SIGNIFICANT CHANGE UP (ref 3.5–5)
PROCALCITONIN SERPL-MCNC: 0.41 NG/ML — HIGH (ref 0.02–0.1)
PROT SERPL-MCNC: 4.2 G/DL — LOW (ref 6–8)
PROT SERPL-MCNC: 4.9 G/DL — LOW (ref 6–8)
PROT SERPL-MCNC: 5 G/DL — LOW (ref 6–8)
PROT SERPL-MCNC: 5.2 G/DL — LOW (ref 6–8)
PROT SERPL-MCNC: 5.3 G/DL — LOW (ref 6–8)
PROT SERPL-MCNC: 5.3 G/DL — LOW (ref 6–8)
PROT SERPL-MCNC: 5.4 G/DL — LOW (ref 6–8)
PROT SERPL-MCNC: 5.5 G/DL — LOW (ref 6–8)
PROT SERPL-MCNC: 5.6 G/DL — LOW (ref 6–8)
PROT SERPL-MCNC: 5.7 G/DL — LOW (ref 6–8)
PROT SERPL-MCNC: 5.7 G/DL — LOW (ref 6–8)
PROT SERPL-MCNC: 5.8 G/DL — LOW (ref 6–8)
PROT SERPL-MCNC: 7.2 G/DL — SIGNIFICANT CHANGE UP (ref 6–8)
PROT UR-MCNC: SIGNIFICANT CHANGE UP
RBC # BLD: 2.94 M/UL — LOW (ref 4.2–5.4)
RBC # BLD: 2.97 M/UL — LOW (ref 4.2–5.4)
RBC # BLD: 3.04 M/UL — LOW (ref 4.2–5.4)
RBC # BLD: 3.1 M/UL — LOW (ref 4.2–5.4)
RBC # BLD: 3.13 M/UL — LOW (ref 4.2–5.4)
RBC # BLD: 3.19 M/UL — LOW (ref 4.2–5.4)
RBC # BLD: 3.25 M/UL — LOW (ref 4.2–5.4)
RBC # BLD: 3.27 M/UL — LOW (ref 4.2–5.4)
RBC # BLD: 3.29 M/UL — LOW (ref 4.2–5.4)
RBC # BLD: 3.37 M/UL — LOW (ref 4.2–5.4)
RBC # BLD: 3.42 M/UL — LOW (ref 4.2–5.4)
RBC # BLD: 3.44 M/UL — LOW (ref 4.2–5.4)
RBC # BLD: 3.51 M/UL — LOW (ref 4.2–5.4)
RBC # BLD: 3.59 M/UL — LOW (ref 4.2–5.4)
RBC # BLD: 3.76 M/UL — LOW (ref 4.2–5.4)
RBC # BLD: 3.92 M/UL — LOW (ref 4.2–5.4)
RBC # BLD: 3.94 M/UL — LOW (ref 4.2–5.4)
RBC # BLD: 3.94 M/UL — LOW (ref 4.2–5.4)
RBC # BLD: 3.96 M/UL — LOW (ref 4.2–5.4)
RBC # BLD: 4.48 M/UL — SIGNIFICANT CHANGE UP (ref 4.2–5.4)
RBC # FLD: 14.6 % — HIGH (ref 11.5–14.5)
RBC # FLD: 14.6 % — HIGH (ref 11.5–14.5)
RBC # FLD: 14.7 % — HIGH (ref 11.5–14.5)
RBC # FLD: 14.8 % — HIGH (ref 11.5–14.5)
RBC # FLD: 14.9 % — HIGH (ref 11.5–14.5)
RBC # FLD: 15 % — HIGH (ref 11.5–14.5)
RBC # FLD: 15.1 % — HIGH (ref 11.5–14.5)
RBC # FLD: 15.2 % — HIGH (ref 11.5–14.5)
RBC # FLD: 15.2 % — HIGH (ref 11.5–14.5)
RBC # FLD: 15.5 % — HIGH (ref 11.5–14.5)
RBC # FLD: 15.8 % — HIGH (ref 11.5–14.5)
RBC # FLD: 16 % — HIGH (ref 11.5–14.5)
RBC BLD AUTO: ABNORMAL
SAO2 % BLDA: 100 % — HIGH (ref 94–98)
SAO2 % BLDA: 69.9 % — LOW (ref 94–98)
SAO2 % BLDA: 85.5 % — LOW (ref 94–98)
SAO2 % BLDA: 94.3 % — SIGNIFICANT CHANGE UP (ref 94–98)
SAO2 % BLDA: 99.3 % — HIGH (ref 94–98)
SAO2 % BLDA: 99.4 % — HIGH (ref 94–98)
SAO2 % BLDA: 99.6 % — HIGH (ref 94–98)
SAO2 % BLDA: 99.6 % — HIGH (ref 94–98)
SAO2 % BLDA: 99.9 % — HIGH (ref 94–98)
SAO2 % BLDA: 99.9 % — HIGH (ref 94–98)
SARS-COV-2 RNA SPEC QL NAA+PROBE: DETECTED
SODIUM SERPL-SCNC: 133 MMOL/L — LOW (ref 135–146)
SODIUM SERPL-SCNC: 133 MMOL/L — LOW (ref 135–146)
SODIUM SERPL-SCNC: 134 MMOL/L — LOW (ref 135–146)
SODIUM SERPL-SCNC: 135 MMOL/L — SIGNIFICANT CHANGE UP (ref 135–146)
SODIUM SERPL-SCNC: 135 MMOL/L — SIGNIFICANT CHANGE UP (ref 135–146)
SODIUM SERPL-SCNC: 136 MMOL/L — SIGNIFICANT CHANGE UP (ref 135–146)
SODIUM SERPL-SCNC: 136 MMOL/L — SIGNIFICANT CHANGE UP (ref 135–146)
SODIUM SERPL-SCNC: 137 MMOL/L — SIGNIFICANT CHANGE UP (ref 135–146)
SODIUM SERPL-SCNC: 138 MMOL/L — SIGNIFICANT CHANGE UP (ref 135–146)
SODIUM SERPL-SCNC: 139 MMOL/L — SIGNIFICANT CHANGE UP (ref 135–146)
SODIUM SERPL-SCNC: 140 MMOL/L — SIGNIFICANT CHANGE UP (ref 135–146)
SODIUM SERPL-SCNC: 140 MMOL/L — SIGNIFICANT CHANGE UP (ref 135–146)
SODIUM SERPL-SCNC: 142 MMOL/L — SIGNIFICANT CHANGE UP (ref 135–146)
SODIUM SERPL-SCNC: 142 MMOL/L — SIGNIFICANT CHANGE UP (ref 135–146)
SODIUM SERPL-SCNC: 145 MMOL/L — SIGNIFICANT CHANGE UP (ref 135–146)
SODIUM SERPL-SCNC: 147 MMOL/L — HIGH (ref 135–146)
SP GR SPEC: 1.02 — SIGNIFICANT CHANGE UP (ref 1.01–1.03)
SPECIMEN SOURCE: SIGNIFICANT CHANGE UP
TROPONIN T SERPL-MCNC: 0.03 NG/ML — CRITICAL HIGH
TROPONIN T SERPL-MCNC: 0.03 NG/ML — CRITICAL HIGH
TROPONIN T SERPL-MCNC: 0.04 NG/ML — CRITICAL HIGH
TSH SERPL-MCNC: 4.83 UIU/ML — HIGH (ref 0.27–4.2)
UROBILINOGEN FLD QL: SIGNIFICANT CHANGE UP
VANCOMYCIN TROUGH SERPL-MCNC: 24.4 UG/ML — HIGH (ref 5–10)
WBC # BLD: 10.22 K/UL — SIGNIFICANT CHANGE UP (ref 4.8–10.8)
WBC # BLD: 10.58 K/UL — SIGNIFICANT CHANGE UP (ref 4.8–10.8)
WBC # BLD: 10.7 K/UL — SIGNIFICANT CHANGE UP (ref 4.8–10.8)
WBC # BLD: 10.73 K/UL — SIGNIFICANT CHANGE UP (ref 4.8–10.8)
WBC # BLD: 12.72 K/UL — HIGH (ref 4.8–10.8)
WBC # BLD: 13.67 K/UL — HIGH (ref 4.8–10.8)
WBC # BLD: 13.85 K/UL — HIGH (ref 4.8–10.8)
WBC # BLD: 14.03 K/UL — HIGH (ref 4.8–10.8)
WBC # BLD: 14.96 K/UL — HIGH (ref 4.8–10.8)
WBC # BLD: 5.53 K/UL — SIGNIFICANT CHANGE UP (ref 4.8–10.8)
WBC # BLD: 5.77 K/UL — SIGNIFICANT CHANGE UP (ref 4.8–10.8)
WBC # BLD: 6.1 K/UL — SIGNIFICANT CHANGE UP (ref 4.8–10.8)
WBC # BLD: 6.54 K/UL — SIGNIFICANT CHANGE UP (ref 4.8–10.8)
WBC # BLD: 6.83 K/UL — SIGNIFICANT CHANGE UP (ref 4.8–10.8)
WBC # BLD: 7.4 K/UL — SIGNIFICANT CHANGE UP (ref 4.8–10.8)
WBC # BLD: 8.1 K/UL — SIGNIFICANT CHANGE UP (ref 4.8–10.8)
WBC # BLD: 8.14 K/UL — SIGNIFICANT CHANGE UP (ref 4.8–10.8)
WBC # BLD: 8.71 K/UL — SIGNIFICANT CHANGE UP (ref 4.8–10.8)
WBC # BLD: 9 K/UL — SIGNIFICANT CHANGE UP (ref 4.8–10.8)
WBC # BLD: 9.39 K/UL — SIGNIFICANT CHANGE UP (ref 4.8–10.8)
WBC # FLD AUTO: 10.22 K/UL — SIGNIFICANT CHANGE UP (ref 4.8–10.8)
WBC # FLD AUTO: 10.58 K/UL — SIGNIFICANT CHANGE UP (ref 4.8–10.8)
WBC # FLD AUTO: 10.7 K/UL — SIGNIFICANT CHANGE UP (ref 4.8–10.8)
WBC # FLD AUTO: 10.73 K/UL — SIGNIFICANT CHANGE UP (ref 4.8–10.8)
WBC # FLD AUTO: 12.72 K/UL — HIGH (ref 4.8–10.8)
WBC # FLD AUTO: 13.67 K/UL — HIGH (ref 4.8–10.8)
WBC # FLD AUTO: 13.85 K/UL — HIGH (ref 4.8–10.8)
WBC # FLD AUTO: 14.03 K/UL — HIGH (ref 4.8–10.8)
WBC # FLD AUTO: 14.96 K/UL — HIGH (ref 4.8–10.8)
WBC # FLD AUTO: 5.53 K/UL — SIGNIFICANT CHANGE UP (ref 4.8–10.8)
WBC # FLD AUTO: 5.77 K/UL — SIGNIFICANT CHANGE UP (ref 4.8–10.8)
WBC # FLD AUTO: 6.1 K/UL — SIGNIFICANT CHANGE UP (ref 4.8–10.8)
WBC # FLD AUTO: 6.54 K/UL — SIGNIFICANT CHANGE UP (ref 4.8–10.8)
WBC # FLD AUTO: 6.83 K/UL — SIGNIFICANT CHANGE UP (ref 4.8–10.8)
WBC # FLD AUTO: 7.4 K/UL — SIGNIFICANT CHANGE UP (ref 4.8–10.8)
WBC # FLD AUTO: 8.1 K/UL — SIGNIFICANT CHANGE UP (ref 4.8–10.8)
WBC # FLD AUTO: 8.14 K/UL — SIGNIFICANT CHANGE UP (ref 4.8–10.8)
WBC # FLD AUTO: 8.71 K/UL — SIGNIFICANT CHANGE UP (ref 4.8–10.8)
WBC # FLD AUTO: 9 K/UL — SIGNIFICANT CHANGE UP (ref 4.8–10.8)
WBC # FLD AUTO: 9.39 K/UL — SIGNIFICANT CHANGE UP (ref 4.8–10.8)

## 2022-01-01 PROCEDURE — 99291 CRITICAL CARE FIRST HOUR: CPT

## 2022-01-01 PROCEDURE — 99233 SBSQ HOSP IP/OBS HIGH 50: CPT

## 2022-01-01 PROCEDURE — 71045 X-RAY EXAM CHEST 1 VIEW: CPT | Mod: 26

## 2022-01-01 PROCEDURE — 93306 TTE W/DOPPLER COMPLETE: CPT | Mod: 26

## 2022-01-01 PROCEDURE — 99497 ADVNCD CARE PLAN 30 MIN: CPT

## 2022-01-01 PROCEDURE — 99498 ADVNCD CARE PLAN ADDL 30 MIN: CPT

## 2022-01-01 PROCEDURE — 70450 CT HEAD/BRAIN W/O DYE: CPT | Mod: 26,MA

## 2022-01-01 PROCEDURE — 93010 ELECTROCARDIOGRAM REPORT: CPT

## 2022-01-01 PROCEDURE — 74018 RADEX ABDOMEN 1 VIEW: CPT | Mod: 26

## 2022-01-01 PROCEDURE — 99291 CRITICAL CARE FIRST HOUR: CPT | Mod: FS,CS,25

## 2022-01-01 PROCEDURE — 71045 X-RAY EXAM CHEST 1 VIEW: CPT | Mod: 26,77

## 2022-01-01 PROCEDURE — 71045 X-RAY EXAM CHEST 1 VIEW: CPT | Mod: 26,76

## 2022-01-01 PROCEDURE — 99232 SBSQ HOSP IP/OBS MODERATE 35: CPT

## 2022-01-01 PROCEDURE — 99291 CRITICAL CARE FIRST HOUR: CPT | Mod: 95

## 2022-01-01 PROCEDURE — 99221 1ST HOSP IP/OBS SF/LOW 40: CPT

## 2022-01-01 PROCEDURE — 31500 INSERT EMERGENCY AIRWAY: CPT | Mod: 59

## 2022-01-01 PROCEDURE — 99223 1ST HOSP IP/OBS HIGH 75: CPT

## 2022-01-01 PROCEDURE — 71275 CT ANGIOGRAPHY CHEST: CPT | Mod: 26,MA

## 2022-01-01 RX ORDER — FENTANYL CITRATE 50 UG/ML
25 INJECTION INTRAVENOUS ONCE
Refills: 0 | Status: DISCONTINUED | OUTPATIENT
Start: 2022-01-01 | End: 2022-01-01

## 2022-01-01 RX ORDER — FUROSEMIDE 40 MG
20 TABLET ORAL ONCE
Refills: 0 | Status: COMPLETED | OUTPATIENT
Start: 2022-01-01 | End: 2022-01-01

## 2022-01-01 RX ORDER — SODIUM CHLORIDE 9 MG/ML
1800 INJECTION, SOLUTION INTRAVENOUS ONCE
Refills: 0 | Status: COMPLETED | OUTPATIENT
Start: 2022-01-01 | End: 2022-01-01

## 2022-01-01 RX ORDER — NOREPINEPHRINE BITARTRATE/D5W 8 MG/250ML
0.05 PLASTIC BAG, INJECTION (ML) INTRAVENOUS
Qty: 16 | Refills: 0 | Status: DISCONTINUED | OUTPATIENT
Start: 2022-01-01 | End: 2022-01-01

## 2022-01-01 RX ORDER — POTASSIUM CHLORIDE 20 MEQ
20 PACKET (EA) ORAL ONCE
Refills: 0 | Status: COMPLETED | OUTPATIENT
Start: 2022-01-01 | End: 2022-01-01

## 2022-01-01 RX ORDER — PANTOPRAZOLE SODIUM 20 MG/1
40 TABLET, DELAYED RELEASE ORAL DAILY
Refills: 0 | Status: DISCONTINUED | OUTPATIENT
Start: 2022-01-01 | End: 2022-01-01

## 2022-01-01 RX ORDER — NOREPINEPHRINE BITARTRATE/D5W 8 MG/250ML
0.05 PLASTIC BAG, INJECTION (ML) INTRAVENOUS
Qty: 8 | Refills: 0 | Status: DISCONTINUED | OUTPATIENT
Start: 2022-01-01 | End: 2022-01-01

## 2022-01-01 RX ORDER — LEVOTHYROXINE SODIUM 125 MCG
1 TABLET ORAL
Qty: 0 | Refills: 0 | DISCHARGE

## 2022-01-01 RX ORDER — METOPROLOL TARTRATE 50 MG
5 TABLET ORAL ONCE
Refills: 0 | Status: COMPLETED | OUTPATIENT
Start: 2022-01-01 | End: 2022-01-01

## 2022-01-01 RX ORDER — OLANZAPINE 15 MG/1
5 TABLET, FILM COATED ORAL AT BEDTIME
Refills: 0 | Status: DISCONTINUED | OUTPATIENT
Start: 2022-01-01 | End: 2022-01-01

## 2022-01-01 RX ORDER — DEXMEDETOMIDINE HYDROCHLORIDE IN 0.9% SODIUM CHLORIDE 4 UG/ML
0.2 INJECTION INTRAVENOUS
Qty: 400 | Refills: 0 | Status: DISCONTINUED | OUTPATIENT
Start: 2022-01-01 | End: 2022-01-01

## 2022-01-01 RX ORDER — PROPOFOL 10 MG/ML
10 INJECTION, EMULSION INTRAVENOUS
Qty: 500 | Refills: 0 | Status: DISCONTINUED | OUTPATIENT
Start: 2022-01-01 | End: 2022-01-01

## 2022-01-01 RX ORDER — PROPOFOL 10 MG/ML
5 INJECTION, EMULSION INTRAVENOUS
Qty: 1000 | Refills: 0 | Status: DISCONTINUED | OUTPATIENT
Start: 2022-01-01 | End: 2022-01-01

## 2022-01-01 RX ORDER — SODIUM,POTASSIUM PHOSPHATES 278-250MG
3 POWDER IN PACKET (EA) ORAL ONCE
Refills: 0 | Status: COMPLETED | OUTPATIENT
Start: 2022-01-01 | End: 2022-01-01

## 2022-01-01 RX ORDER — MIDODRINE HYDROCHLORIDE 2.5 MG/1
20 TABLET ORAL EVERY 8 HOURS
Refills: 0 | Status: DISCONTINUED | OUTPATIENT
Start: 2022-01-01 | End: 2022-01-01

## 2022-01-01 RX ORDER — POTASSIUM CHLORIDE 20 MEQ
20 PACKET (EA) ORAL
Refills: 0 | Status: COMPLETED | OUTPATIENT
Start: 2022-01-01 | End: 2022-01-01

## 2022-01-01 RX ORDER — SODIUM CHLORIDE 9 MG/ML
1000 INJECTION, SOLUTION INTRAVENOUS
Refills: 0 | Status: DISCONTINUED | OUTPATIENT
Start: 2022-01-01 | End: 2022-01-01

## 2022-01-01 RX ORDER — SCOPALAMINE 1 MG/3D
1 PATCH, EXTENDED RELEASE TRANSDERMAL ONCE
Refills: 0 | Status: COMPLETED | OUTPATIENT
Start: 2022-01-01 | End: 2022-01-01

## 2022-01-01 RX ORDER — LANOLIN ALCOHOL/MO/W.PET/CERES
1 CREAM (GRAM) TOPICAL
Qty: 0 | Refills: 0 | DISCHARGE

## 2022-01-01 RX ORDER — GLUCAGON INJECTION, SOLUTION 0.5 MG/.1ML
1 INJECTION, SOLUTION SUBCUTANEOUS ONCE
Refills: 0 | Status: DISCONTINUED | OUTPATIENT
Start: 2022-01-01 | End: 2022-01-01

## 2022-01-01 RX ORDER — POLYETHYLENE GLYCOL 3350 17 G/17G
17 POWDER, FOR SOLUTION ORAL
Refills: 0 | Status: DISCONTINUED | OUTPATIENT
Start: 2022-01-01 | End: 2022-01-01

## 2022-01-01 RX ORDER — FENTANYL CITRATE 50 UG/ML
0.5 INJECTION INTRAVENOUS
Qty: 2500 | Refills: 0 | Status: DISCONTINUED | OUTPATIENT
Start: 2022-01-01 | End: 2022-01-01

## 2022-01-01 RX ORDER — MAGNESIUM SULFATE 500 MG/ML
1 VIAL (ML) INJECTION ONCE
Refills: 0 | Status: COMPLETED | OUTPATIENT
Start: 2022-01-01 | End: 2022-01-01

## 2022-01-01 RX ORDER — ATORVASTATIN CALCIUM 80 MG/1
1 TABLET, FILM COATED ORAL
Qty: 0 | Refills: 0 | DISCHARGE

## 2022-01-01 RX ORDER — DEXTROSE 50 % IN WATER 50 %
15 SYRINGE (ML) INTRAVENOUS ONCE
Refills: 0 | Status: DISCONTINUED | OUTPATIENT
Start: 2022-01-01 | End: 2022-01-01

## 2022-01-01 RX ORDER — FUROSEMIDE 40 MG
20 TABLET ORAL ONCE
Refills: 0 | Status: DISCONTINUED | OUTPATIENT
Start: 2022-01-01 | End: 2022-01-01

## 2022-01-01 RX ORDER — LACTULOSE 10 G/15ML
30 SOLUTION ORAL
Qty: 0 | Refills: 0 | DISCHARGE

## 2022-01-01 RX ORDER — MAGNESIUM SULFATE 500 MG/ML
2 VIAL (ML) INJECTION ONCE
Refills: 0 | Status: COMPLETED | OUTPATIENT
Start: 2022-01-01 | End: 2022-01-01

## 2022-01-01 RX ORDER — HYDROMORPHONE HYDROCHLORIDE 2 MG/ML
0.5 INJECTION INTRAMUSCULAR; INTRAVENOUS; SUBCUTANEOUS
Refills: 0 | Status: DISCONTINUED | OUTPATIENT
Start: 2022-01-01 | End: 2022-01-01

## 2022-01-01 RX ORDER — CHLORHEXIDINE GLUCONATE 213 G/1000ML
15 SOLUTION TOPICAL
Refills: 0 | Status: DISCONTINUED | OUTPATIENT
Start: 2022-01-01 | End: 2022-01-01

## 2022-01-01 RX ORDER — ENOXAPARIN SODIUM 100 MG/ML
40 INJECTION SUBCUTANEOUS EVERY 24 HOURS
Refills: 0 | Status: DISCONTINUED | OUTPATIENT
Start: 2022-01-01 | End: 2022-01-01

## 2022-01-01 RX ORDER — DOCUSATE SODIUM 100 MG
1 CAPSULE ORAL
Qty: 0 | Refills: 0 | DISCHARGE

## 2022-01-01 RX ORDER — LEVOTHYROXINE SODIUM 125 MCG
75 TABLET ORAL DAILY
Refills: 0 | Status: DISCONTINUED | OUTPATIENT
Start: 2022-01-01 | End: 2022-01-01

## 2022-01-01 RX ORDER — SODIUM CHLORIDE 9 MG/ML
500 INJECTION INTRAMUSCULAR; INTRAVENOUS; SUBCUTANEOUS ONCE
Refills: 0 | Status: COMPLETED | OUTPATIENT
Start: 2022-01-01 | End: 2022-01-01

## 2022-01-01 RX ORDER — DEXTROSE 50 % IN WATER 50 %
12.5 SYRINGE (ML) INTRAVENOUS ONCE
Refills: 0 | Status: DISCONTINUED | OUTPATIENT
Start: 2022-01-01 | End: 2022-01-01

## 2022-01-01 RX ORDER — MORPHINE SULFATE 50 MG/1
1 CAPSULE, EXTENDED RELEASE ORAL
Qty: 100 | Refills: 0 | Status: DISCONTINUED | OUTPATIENT
Start: 2022-01-01 | End: 2022-01-01

## 2022-01-01 RX ORDER — MIDAZOLAM HYDROCHLORIDE 1 MG/ML
2 INJECTION, SOLUTION INTRAMUSCULAR; INTRAVENOUS ONCE
Refills: 0 | Status: DISCONTINUED | OUTPATIENT
Start: 2022-01-01 | End: 2022-01-01

## 2022-01-01 RX ORDER — POTASSIUM CHLORIDE 20 MEQ
40 PACKET (EA) ORAL ONCE
Refills: 0 | Status: COMPLETED | OUTPATIENT
Start: 2022-01-01 | End: 2022-01-01

## 2022-01-01 RX ORDER — SODIUM CHLORIDE 9 MG/ML
500 INJECTION, SOLUTION INTRAVENOUS ONCE
Refills: 0 | Status: DISCONTINUED | OUTPATIENT
Start: 2022-01-01 | End: 2022-01-01

## 2022-01-01 RX ORDER — CEFEPIME 1 G/1
2000 INJECTION, POWDER, FOR SOLUTION INTRAMUSCULAR; INTRAVENOUS ONCE
Refills: 0 | Status: COMPLETED | OUTPATIENT
Start: 2022-01-01 | End: 2022-01-01

## 2022-01-01 RX ORDER — MIDAZOLAM HYDROCHLORIDE 1 MG/ML
0.02 INJECTION, SOLUTION INTRAMUSCULAR; INTRAVENOUS
Qty: 100 | Refills: 0 | Status: DISCONTINUED | OUTPATIENT
Start: 2022-01-01 | End: 2022-01-01

## 2022-01-01 RX ORDER — CEFEPIME 1 G/1
2000 INJECTION, POWDER, FOR SOLUTION INTRAMUSCULAR; INTRAVENOUS EVERY 8 HOURS
Refills: 0 | Status: DISCONTINUED | OUTPATIENT
Start: 2022-01-01 | End: 2022-01-01

## 2022-01-01 RX ORDER — VANCOMYCIN HCL 1 G
750 VIAL (EA) INTRAVENOUS EVERY 12 HOURS
Refills: 0 | Status: DISCONTINUED | OUTPATIENT
Start: 2022-01-01 | End: 2022-01-01

## 2022-01-01 RX ORDER — INSULIN LISPRO 100/ML
VIAL (ML) SUBCUTANEOUS
Refills: 0 | Status: DISCONTINUED | OUTPATIENT
Start: 2022-01-01 | End: 2022-01-01

## 2022-01-01 RX ORDER — FENTANYL CITRATE 50 UG/ML
50 INJECTION INTRAVENOUS ONCE
Refills: 0 | Status: DISCONTINUED | OUTPATIENT
Start: 2022-01-01 | End: 2022-01-01

## 2022-01-01 RX ORDER — CEFTRIAXONE 500 MG/1
1000 INJECTION, POWDER, FOR SOLUTION INTRAMUSCULAR; INTRAVENOUS EVERY 24 HOURS
Refills: 0 | Status: COMPLETED | OUTPATIENT
Start: 2022-01-01 | End: 2022-01-01

## 2022-01-01 RX ORDER — SODIUM CHLORIDE 9 MG/ML
250 INJECTION, SOLUTION INTRAVENOUS
Refills: 0 | Status: COMPLETED | OUTPATIENT
Start: 2022-01-01 | End: 2022-01-01

## 2022-01-01 RX ORDER — AMLODIPINE VALSARTAN AND HYDROCHLOROTHIAZIDE 10; 160; 25 MG/1; MG/1; MG/1
1 TABLET, FILM COATED ORAL
Qty: 0 | Refills: 0 | DISCHARGE

## 2022-01-01 RX ORDER — MAGNESIUM SULFATE 500 MG/ML
2 VIAL (ML) INJECTION
Refills: 0 | Status: COMPLETED | OUTPATIENT
Start: 2022-01-01 | End: 2022-01-01

## 2022-01-01 RX ORDER — MUPIROCIN 20 MG/G
1 OINTMENT TOPICAL
Refills: 0 | Status: DISCONTINUED | OUTPATIENT
Start: 2022-01-01 | End: 2022-01-01

## 2022-01-01 RX ORDER — DEXTROSE 50 % IN WATER 50 %
25 SYRINGE (ML) INTRAVENOUS ONCE
Refills: 0 | Status: DISCONTINUED | OUTPATIENT
Start: 2022-01-01 | End: 2022-01-01

## 2022-01-01 RX ORDER — FUROSEMIDE 40 MG
40 TABLET ORAL ONCE
Refills: 0 | Status: COMPLETED | OUTPATIENT
Start: 2022-01-01 | End: 2022-01-01

## 2022-01-01 RX ORDER — CHLORHEXIDINE GLUCONATE 213 G/1000ML
1 SOLUTION TOPICAL
Refills: 0 | Status: DISCONTINUED | OUTPATIENT
Start: 2022-01-01 | End: 2022-01-01

## 2022-01-01 RX ORDER — AMPHOTERICIN B 50 MG/12.5ML
200 INJECTION, POWDER, LYOPHILIZED, FOR SOLUTION INTRAVENOUS ONCE
Refills: 0 | Status: DISCONTINUED | OUTPATIENT
Start: 2022-01-01 | End: 2022-01-01

## 2022-01-01 RX ORDER — ATORVASTATIN CALCIUM 80 MG/1
20 TABLET, FILM COATED ORAL AT BEDTIME
Refills: 0 | Status: DISCONTINUED | OUTPATIENT
Start: 2022-01-01 | End: 2022-01-01

## 2022-01-01 RX ORDER — MIDODRINE HYDROCHLORIDE 2.5 MG/1
10 TABLET ORAL EVERY 8 HOURS
Refills: 0 | Status: DISCONTINUED | OUTPATIENT
Start: 2022-01-01 | End: 2022-01-01

## 2022-01-01 RX ORDER — ROBINUL 0.2 MG/ML
0.2 INJECTION INTRAMUSCULAR; INTRAVENOUS EVERY 6 HOURS
Refills: 0 | Status: DISCONTINUED | OUTPATIENT
Start: 2022-01-01 | End: 2022-01-01

## 2022-01-01 RX ORDER — VANCOMYCIN HCL 1 G
1000 VIAL (EA) INTRAVENOUS ONCE
Refills: 0 | Status: COMPLETED | OUTPATIENT
Start: 2022-01-01 | End: 2022-01-01

## 2022-01-01 RX ORDER — SODIUM CHLORIDE 9 MG/ML
500 INJECTION, SOLUTION INTRAVENOUS ONCE
Refills: 0 | Status: COMPLETED | OUTPATIENT
Start: 2022-01-01 | End: 2022-01-01

## 2022-01-01 RX ORDER — VANCOMYCIN HCL 1 G
1000 VIAL (EA) INTRAVENOUS EVERY 12 HOURS
Refills: 0 | Status: DISCONTINUED | OUTPATIENT
Start: 2022-01-01 | End: 2022-01-01

## 2022-01-01 RX ORDER — OLANZAPINE 15 MG/1
2.5 TABLET, FILM COATED ORAL AT BEDTIME
Refills: 0 | Status: DISCONTINUED | OUTPATIENT
Start: 2022-01-01 | End: 2022-01-01

## 2022-01-01 RX ORDER — ASCORBIC ACID 60 MG
1 TABLET,CHEWABLE ORAL
Qty: 0 | Refills: 0 | DISCHARGE

## 2022-01-01 RX ORDER — POLYETHYLENE GLYCOL 3350 17 G/17G
17 POWDER, FOR SOLUTION ORAL
Refills: 0 | Status: COMPLETED | OUTPATIENT
Start: 2022-01-01 | End: 2022-01-01

## 2022-01-01 RX ORDER — PROPOFOL 10 MG/ML
10 INJECTION, EMULSION INTRAVENOUS
Qty: 1000 | Refills: 0 | Status: DISCONTINUED | OUTPATIENT
Start: 2022-01-01 | End: 2022-01-01

## 2022-01-01 RX ORDER — CEFEPIME 1 G/1
2000 INJECTION, POWDER, FOR SOLUTION INTRAMUSCULAR; INTRAVENOUS EVERY 12 HOURS
Refills: 0 | Status: DISCONTINUED | OUTPATIENT
Start: 2022-01-01 | End: 2022-01-01

## 2022-01-01 RX ORDER — FUROSEMIDE 40 MG
20 TABLET ORAL
Refills: 0 | Status: DISCONTINUED | OUTPATIENT
Start: 2022-01-01 | End: 2022-01-01

## 2022-01-01 RX ORDER — SENNA PLUS 8.6 MG/1
2 TABLET ORAL AT BEDTIME
Refills: 0 | Status: DISCONTINUED | OUTPATIENT
Start: 2022-01-01 | End: 2022-01-01

## 2022-01-01 RX ORDER — LACTULOSE 10 G/15ML
10 SOLUTION ORAL
Refills: 0 | Status: DISCONTINUED | OUTPATIENT
Start: 2022-01-01 | End: 2022-01-01

## 2022-01-01 RX ORDER — ACETAMINOPHEN 500 MG
650 TABLET ORAL EVERY 6 HOURS
Refills: 0 | Status: DISCONTINUED | OUTPATIENT
Start: 2022-01-01 | End: 2022-01-01

## 2022-01-01 RX ORDER — LACTULOSE 10 G/15ML
200 SOLUTION ORAL ONCE
Refills: 0 | Status: DISCONTINUED | OUTPATIENT
Start: 2022-01-01 | End: 2022-01-01

## 2022-01-01 RX ADMIN — Medication 20 MILLIGRAM(S): at 05:03

## 2022-01-01 RX ADMIN — MUPIROCIN 1 APPLICATION(S): 20 OINTMENT TOPICAL at 05:11

## 2022-01-01 RX ADMIN — Medication 2: at 21:35

## 2022-01-01 RX ADMIN — FENTANYL CITRATE 25 MICROGRAM(S): 50 INJECTION INTRAVENOUS at 22:50

## 2022-01-01 RX ADMIN — Medication 20 MILLIGRAM(S): at 13:14

## 2022-01-01 RX ADMIN — POLYETHYLENE GLYCOL 3350 17 GRAM(S): 17 POWDER, FOR SOLUTION ORAL at 17:03

## 2022-01-01 RX ADMIN — Medication 110 MILLIGRAM(S): at 05:41

## 2022-01-01 RX ADMIN — FENTANYL CITRATE 50 MICROGRAM(S): 50 INJECTION INTRAVENOUS at 16:38

## 2022-01-01 RX ADMIN — Medication 2: at 17:46

## 2022-01-01 RX ADMIN — Medication 100 GRAM(S): at 08:20

## 2022-01-01 RX ADMIN — PROPOFOL 2.92 MICROGRAM(S)/KG/MIN: 10 INJECTION, EMULSION INTRAVENOUS at 03:00

## 2022-01-01 RX ADMIN — CHLORHEXIDINE GLUCONATE 15 MILLILITER(S): 213 SOLUTION TOPICAL at 06:17

## 2022-01-01 RX ADMIN — PANTOPRAZOLE SODIUM 40 MILLIGRAM(S): 20 TABLET, DELAYED RELEASE ORAL at 11:44

## 2022-01-01 RX ADMIN — Medication 2: at 21:05

## 2022-01-01 RX ADMIN — CHLORHEXIDINE GLUCONATE 15 MILLILITER(S): 213 SOLUTION TOPICAL at 18:40

## 2022-01-01 RX ADMIN — ATORVASTATIN CALCIUM 20 MILLIGRAM(S): 80 TABLET, FILM COATED ORAL at 22:02

## 2022-01-01 RX ADMIN — POLYETHYLENE GLYCOL 3350 17 GRAM(S): 17 POWDER, FOR SOLUTION ORAL at 05:14

## 2022-01-01 RX ADMIN — ATORVASTATIN CALCIUM 20 MILLIGRAM(S): 80 TABLET, FILM COATED ORAL at 21:06

## 2022-01-01 RX ADMIN — PROPOFOL 2.92 MICROGRAM(S)/KG/MIN: 10 INJECTION, EMULSION INTRAVENOUS at 03:36

## 2022-01-01 RX ADMIN — ENOXAPARIN SODIUM 40 MILLIGRAM(S): 100 INJECTION SUBCUTANEOUS at 14:59

## 2022-01-01 RX ADMIN — Medication 110 MILLIGRAM(S): at 05:10

## 2022-01-01 RX ADMIN — ENOXAPARIN SODIUM 40 MILLIGRAM(S): 100 INJECTION SUBCUTANEOUS at 16:47

## 2022-01-01 RX ADMIN — CEFEPIME 100 MILLIGRAM(S): 1 INJECTION, POWDER, FOR SOLUTION INTRAMUSCULAR; INTRAVENOUS at 18:33

## 2022-01-01 RX ADMIN — Medication 2: at 21:21

## 2022-01-01 RX ADMIN — MUPIROCIN 1 APPLICATION(S): 20 OINTMENT TOPICAL at 17:47

## 2022-01-01 RX ADMIN — SCOPALAMINE 1 PATCH: 1 PATCH, EXTENDED RELEASE TRANSDERMAL at 19:55

## 2022-01-01 RX ADMIN — MUPIROCIN 1 APPLICATION(S): 20 OINTMENT TOPICAL at 18:23

## 2022-01-01 RX ADMIN — MIDODRINE HYDROCHLORIDE 20 MILLIGRAM(S): 2.5 TABLET ORAL at 15:00

## 2022-01-01 RX ADMIN — MUPIROCIN 1 APPLICATION(S): 20 OINTMENT TOPICAL at 17:34

## 2022-01-01 RX ADMIN — Medication 50 MILLIEQUIVALENT(S): at 06:12

## 2022-01-01 RX ADMIN — CHLORHEXIDINE GLUCONATE 15 MILLILITER(S): 213 SOLUTION TOPICAL at 07:45

## 2022-01-01 RX ADMIN — Medication 2.28 MICROGRAM(S)/KG/MIN: at 17:05

## 2022-01-01 RX ADMIN — Medication 250 MILLIGRAM(S): at 12:14

## 2022-01-01 RX ADMIN — PROPOFOL 2.92 MICROGRAM(S)/KG/MIN: 10 INJECTION, EMULSION INTRAVENOUS at 18:06

## 2022-01-01 RX ADMIN — Medication 40 MILLIEQUIVALENT(S): at 06:10

## 2022-01-01 RX ADMIN — POLYETHYLENE GLYCOL 3350 17 GRAM(S): 17 POWDER, FOR SOLUTION ORAL at 05:33

## 2022-01-01 RX ADMIN — CHLORHEXIDINE GLUCONATE 15 MILLILITER(S): 213 SOLUTION TOPICAL at 05:58

## 2022-01-01 RX ADMIN — MUPIROCIN 1 APPLICATION(S): 20 OINTMENT TOPICAL at 17:04

## 2022-01-01 RX ADMIN — CHLORHEXIDINE GLUCONATE 15 MILLILITER(S): 213 SOLUTION TOPICAL at 17:06

## 2022-01-01 RX ADMIN — Medication 25 GRAM(S): at 08:58

## 2022-01-01 RX ADMIN — Medication 20 MILLIEQUIVALENT(S): at 12:31

## 2022-01-01 RX ADMIN — Medication 4: at 09:38

## 2022-01-01 RX ADMIN — SENNA PLUS 2 TABLET(S): 8.6 TABLET ORAL at 21:06

## 2022-01-01 RX ADMIN — Medication 110 MILLIGRAM(S): at 18:23

## 2022-01-01 RX ADMIN — SENNA PLUS 2 TABLET(S): 8.6 TABLET ORAL at 21:56

## 2022-01-01 RX ADMIN — FENTANYL CITRATE 25 MICROGRAM(S): 50 INJECTION INTRAVENOUS at 18:25

## 2022-01-01 RX ADMIN — Medication 20 MILLIGRAM(S): at 10:25

## 2022-01-01 RX ADMIN — PANTOPRAZOLE SODIUM 40 MILLIGRAM(S): 20 TABLET, DELAYED RELEASE ORAL at 11:12

## 2022-01-01 RX ADMIN — FENTANYL CITRATE 2.44 MICROGRAM(S)/KG/HR: 50 INJECTION INTRAVENOUS at 17:05

## 2022-01-01 RX ADMIN — CHLORHEXIDINE GLUCONATE 15 MILLILITER(S): 213 SOLUTION TOPICAL at 18:19

## 2022-01-01 RX ADMIN — MUPIROCIN 1 APPLICATION(S): 20 OINTMENT TOPICAL at 17:06

## 2022-01-01 RX ADMIN — CHLORHEXIDINE GLUCONATE 1 APPLICATION(S): 213 SOLUTION TOPICAL at 05:34

## 2022-01-01 RX ADMIN — SENNA PLUS 2 TABLET(S): 8.6 TABLET ORAL at 22:49

## 2022-01-01 RX ADMIN — Medication 5 MILLIGRAM(S): at 23:07

## 2022-01-01 RX ADMIN — Medication 110 MILLIGRAM(S): at 18:50

## 2022-01-01 RX ADMIN — Medication 110 MILLIGRAM(S): at 20:31

## 2022-01-01 RX ADMIN — MIDODRINE HYDROCHLORIDE 10 MILLIGRAM(S): 2.5 TABLET ORAL at 22:56

## 2022-01-01 RX ADMIN — PANTOPRAZOLE SODIUM 40 MILLIGRAM(S): 20 TABLET, DELAYED RELEASE ORAL at 15:45

## 2022-01-01 RX ADMIN — Medication 25 GRAM(S): at 08:01

## 2022-01-01 RX ADMIN — SCOPALAMINE 1 PATCH: 1 PATCH, EXTENDED RELEASE TRANSDERMAL at 07:15

## 2022-01-01 RX ADMIN — PROPOFOL 1.46 MICROGRAM(S)/KG/MIN: 10 INJECTION, EMULSION INTRAVENOUS at 19:45

## 2022-01-01 RX ADMIN — OLANZAPINE 2.5 MILLIGRAM(S): 15 TABLET, FILM COATED ORAL at 21:57

## 2022-01-01 RX ADMIN — SODIUM CHLORIDE 1000 MILLILITER(S): 9 INJECTION, SOLUTION INTRAVENOUS at 00:00

## 2022-01-01 RX ADMIN — Medication 20 MILLIGRAM(S): at 23:33

## 2022-01-01 RX ADMIN — FENTANYL CITRATE 50 MICROGRAM(S): 50 INJECTION INTRAVENOUS at 16:35

## 2022-01-01 RX ADMIN — Medication 20 MILLIGRAM(S): at 12:06

## 2022-01-01 RX ADMIN — Medication 2: at 18:04

## 2022-01-01 RX ADMIN — Medication 2: at 08:03

## 2022-01-01 RX ADMIN — CHLORHEXIDINE GLUCONATE 1 APPLICATION(S): 213 SOLUTION TOPICAL at 05:59

## 2022-01-01 RX ADMIN — MIDODRINE HYDROCHLORIDE 10 MILLIGRAM(S): 2.5 TABLET ORAL at 05:58

## 2022-01-01 RX ADMIN — DEXMEDETOMIDINE HYDROCHLORIDE IN 0.9% SODIUM CHLORIDE 3 MICROGRAM(S)/KG/HR: 4 INJECTION INTRAVENOUS at 23:11

## 2022-01-01 RX ADMIN — CEFEPIME 100 MILLIGRAM(S): 1 INJECTION, POWDER, FOR SOLUTION INTRAMUSCULAR; INTRAVENOUS at 07:05

## 2022-01-01 RX ADMIN — CEFTRIAXONE 100 MILLIGRAM(S): 500 INJECTION, POWDER, FOR SOLUTION INTRAMUSCULAR; INTRAVENOUS at 16:28

## 2022-01-01 RX ADMIN — POLYETHYLENE GLYCOL 3350 17 GRAM(S): 17 POWDER, FOR SOLUTION ORAL at 17:09

## 2022-01-01 RX ADMIN — CEFTRIAXONE 100 MILLIGRAM(S): 500 INJECTION, POWDER, FOR SOLUTION INTRAMUSCULAR; INTRAVENOUS at 14:26

## 2022-01-01 RX ADMIN — Medication 100 GRAM(S): at 10:29

## 2022-01-01 RX ADMIN — SENNA PLUS 2 TABLET(S): 8.6 TABLET ORAL at 21:21

## 2022-01-01 RX ADMIN — CHLORHEXIDINE GLUCONATE 15 MILLILITER(S): 213 SOLUTION TOPICAL at 17:20

## 2022-01-01 RX ADMIN — POLYETHYLENE GLYCOL 3350 17 GRAM(S): 17 POWDER, FOR SOLUTION ORAL at 05:59

## 2022-01-01 RX ADMIN — Medication 2: at 23:31

## 2022-01-01 RX ADMIN — Medication 2 MILLIGRAM(S): at 03:40

## 2022-01-01 RX ADMIN — ENOXAPARIN SODIUM 40 MILLIGRAM(S): 100 INJECTION SUBCUTANEOUS at 15:55

## 2022-01-01 RX ADMIN — FENTANYL CITRATE 25 MICROGRAM(S): 50 INJECTION INTRAVENOUS at 12:11

## 2022-01-01 RX ADMIN — PANTOPRAZOLE SODIUM 40 MILLIGRAM(S): 20 TABLET, DELAYED RELEASE ORAL at 12:01

## 2022-01-01 RX ADMIN — ATORVASTATIN CALCIUM 20 MILLIGRAM(S): 80 TABLET, FILM COATED ORAL at 22:51

## 2022-01-01 RX ADMIN — ATORVASTATIN CALCIUM 20 MILLIGRAM(S): 80 TABLET, FILM COATED ORAL at 21:03

## 2022-01-01 RX ADMIN — POLYETHYLENE GLYCOL 3350 17 GRAM(S): 17 POWDER, FOR SOLUTION ORAL at 05:40

## 2022-01-01 RX ADMIN — Medication 2: at 05:39

## 2022-01-01 RX ADMIN — POLYETHYLENE GLYCOL 3350 17 GRAM(S): 17 POWDER, FOR SOLUTION ORAL at 17:35

## 2022-01-01 RX ADMIN — CHLORHEXIDINE GLUCONATE 1 APPLICATION(S): 213 SOLUTION TOPICAL at 06:17

## 2022-01-01 RX ADMIN — Medication 110 MILLIGRAM(S): at 18:39

## 2022-01-01 RX ADMIN — SODIUM CHLORIDE 1500 MILLILITER(S): 9 INJECTION, SOLUTION INTRAVENOUS at 11:00

## 2022-01-01 RX ADMIN — Medication 110 MILLIGRAM(S): at 05:28

## 2022-01-01 RX ADMIN — MIDODRINE HYDROCHLORIDE 20 MILLIGRAM(S): 2.5 TABLET ORAL at 14:36

## 2022-01-01 RX ADMIN — ENOXAPARIN SODIUM 40 MILLIGRAM(S): 100 INJECTION SUBCUTANEOUS at 14:27

## 2022-01-01 RX ADMIN — POLYETHYLENE GLYCOL 3350 17 GRAM(S): 17 POWDER, FOR SOLUTION ORAL at 05:01

## 2022-01-01 RX ADMIN — Medication 75 MICROGRAM(S): at 05:40

## 2022-01-01 RX ADMIN — MIDODRINE HYDROCHLORIDE 20 MILLIGRAM(S): 2.5 TABLET ORAL at 05:32

## 2022-01-01 RX ADMIN — PANTOPRAZOLE SODIUM 40 MILLIGRAM(S): 20 TABLET, DELAYED RELEASE ORAL at 12:12

## 2022-01-01 RX ADMIN — MIDODRINE HYDROCHLORIDE 10 MILLIGRAM(S): 2.5 TABLET ORAL at 14:39

## 2022-01-01 RX ADMIN — CHLORHEXIDINE GLUCONATE 1 APPLICATION(S): 213 SOLUTION TOPICAL at 05:39

## 2022-01-01 RX ADMIN — Medication 2: at 09:11

## 2022-01-01 RX ADMIN — Medication 62.5 MILLIMOLE(S): at 10:18

## 2022-01-01 RX ADMIN — ENOXAPARIN SODIUM 40 MILLIGRAM(S): 100 INJECTION SUBCUTANEOUS at 15:22

## 2022-01-01 RX ADMIN — MUPIROCIN 1 APPLICATION(S): 20 OINTMENT TOPICAL at 17:56

## 2022-01-01 RX ADMIN — Medication 75 MICROGRAM(S): at 05:49

## 2022-01-01 RX ADMIN — Medication 50 MILLIEQUIVALENT(S): at 12:37

## 2022-01-01 RX ADMIN — ATORVASTATIN CALCIUM 20 MILLIGRAM(S): 80 TABLET, FILM COATED ORAL at 22:48

## 2022-01-01 RX ADMIN — CEFTRIAXONE 100 MILLIGRAM(S): 500 INJECTION, POWDER, FOR SOLUTION INTRAMUSCULAR; INTRAVENOUS at 16:47

## 2022-01-01 RX ADMIN — MIDAZOLAM HYDROCHLORIDE 2 MILLIGRAM(S): 1 INJECTION, SOLUTION INTRAMUSCULAR; INTRAVENOUS at 06:53

## 2022-01-01 RX ADMIN — CHLORHEXIDINE GLUCONATE 15 MILLILITER(S): 213 SOLUTION TOPICAL at 05:05

## 2022-01-01 RX ADMIN — SODIUM CHLORIDE 500 MILLILITER(S): 9 INJECTION INTRAMUSCULAR; INTRAVENOUS; SUBCUTANEOUS at 14:01

## 2022-01-01 RX ADMIN — Medication 250 MILLIGRAM(S): at 07:06

## 2022-01-01 RX ADMIN — ATORVASTATIN CALCIUM 20 MILLIGRAM(S): 80 TABLET, FILM COATED ORAL at 21:21

## 2022-01-01 RX ADMIN — Medication 5 MILLIGRAM(S): at 23:11

## 2022-01-01 RX ADMIN — OLANZAPINE 2.5 MILLIGRAM(S): 15 TABLET, FILM COATED ORAL at 22:02

## 2022-01-01 RX ADMIN — Medication 20 MILLIGRAM(S): at 05:29

## 2022-01-01 RX ADMIN — Medication 20 MILLIGRAM(S): at 05:05

## 2022-01-01 RX ADMIN — Medication 2 MILLIGRAM(S): at 00:46

## 2022-01-01 RX ADMIN — MUPIROCIN 1 APPLICATION(S): 20 OINTMENT TOPICAL at 05:05

## 2022-01-01 RX ADMIN — OLANZAPINE 2.5 MILLIGRAM(S): 15 TABLET, FILM COATED ORAL at 21:20

## 2022-01-01 RX ADMIN — SCOPALAMINE 1 PATCH: 1 PATCH, EXTENDED RELEASE TRANSDERMAL at 18:51

## 2022-01-01 RX ADMIN — DEXMEDETOMIDINE HYDROCHLORIDE IN 0.9% SODIUM CHLORIDE 3 MICROGRAM(S)/KG/HR: 4 INJECTION INTRAVENOUS at 17:38

## 2022-01-01 RX ADMIN — Medication 75 MICROGRAM(S): at 05:29

## 2022-01-01 RX ADMIN — MUPIROCIN 1 APPLICATION(S): 20 OINTMENT TOPICAL at 05:59

## 2022-01-01 RX ADMIN — SENNA PLUS 2 TABLET(S): 8.6 TABLET ORAL at 22:18

## 2022-01-01 RX ADMIN — CHLORHEXIDINE GLUCONATE 15 MILLILITER(S): 213 SOLUTION TOPICAL at 17:03

## 2022-01-01 RX ADMIN — SCOPALAMINE 1 PATCH: 1 PATCH, EXTENDED RELEASE TRANSDERMAL at 05:30

## 2022-01-01 RX ADMIN — MIDAZOLAM HYDROCHLORIDE 2 MILLIGRAM(S): 1 INJECTION, SOLUTION INTRAMUSCULAR; INTRAVENOUS at 13:21

## 2022-01-01 RX ADMIN — MIDODRINE HYDROCHLORIDE 20 MILLIGRAM(S): 2.5 TABLET ORAL at 21:06

## 2022-01-01 RX ADMIN — CHLORHEXIDINE GLUCONATE 1 APPLICATION(S): 213 SOLUTION TOPICAL at 05:30

## 2022-01-01 RX ADMIN — DEXMEDETOMIDINE HYDROCHLORIDE IN 0.9% SODIUM CHLORIDE 2.44 MICROGRAM(S)/KG/HR: 4 INJECTION INTRAVENOUS at 22:02

## 2022-01-01 RX ADMIN — Medication 2: at 07:58

## 2022-01-01 RX ADMIN — MIDAZOLAM HYDROCHLORIDE 2 MILLIGRAM(S): 1 INJECTION, SOLUTION INTRAMUSCULAR; INTRAVENOUS at 01:59

## 2022-01-01 RX ADMIN — Medication 110 MILLIGRAM(S): at 17:50

## 2022-01-01 RX ADMIN — MUPIROCIN 1 APPLICATION(S): 20 OINTMENT TOPICAL at 05:26

## 2022-01-01 RX ADMIN — SENNA PLUS 2 TABLET(S): 8.6 TABLET ORAL at 21:05

## 2022-01-01 RX ADMIN — Medication 75 MICROGRAM(S): at 05:37

## 2022-01-01 RX ADMIN — ATORVASTATIN CALCIUM 20 MILLIGRAM(S): 80 TABLET, FILM COATED ORAL at 21:20

## 2022-01-01 RX ADMIN — Medication 75 MICROGRAM(S): at 05:58

## 2022-01-01 RX ADMIN — Medication 50 MILLIEQUIVALENT(S): at 08:03

## 2022-01-01 RX ADMIN — CHLORHEXIDINE GLUCONATE 15 MILLILITER(S): 213 SOLUTION TOPICAL at 18:33

## 2022-01-01 RX ADMIN — PANTOPRAZOLE SODIUM 40 MILLIGRAM(S): 20 TABLET, DELAYED RELEASE ORAL at 12:11

## 2022-01-01 RX ADMIN — MUPIROCIN 1 APPLICATION(S): 20 OINTMENT TOPICAL at 05:37

## 2022-01-01 RX ADMIN — CHLORHEXIDINE GLUCONATE 1 APPLICATION(S): 213 SOLUTION TOPICAL at 05:29

## 2022-01-01 RX ADMIN — CEFEPIME 100 MILLIGRAM(S): 1 INJECTION, POWDER, FOR SOLUTION INTRAMUSCULAR; INTRAVENOUS at 13:11

## 2022-01-01 RX ADMIN — Medication 75 MICROGRAM(S): at 05:30

## 2022-01-01 RX ADMIN — Medication 110 MILLIGRAM(S): at 17:30

## 2022-01-01 RX ADMIN — ENOXAPARIN SODIUM 40 MILLIGRAM(S): 100 INJECTION SUBCUTANEOUS at 15:10

## 2022-01-01 RX ADMIN — Medication 2: at 21:19

## 2022-01-01 RX ADMIN — MUPIROCIN 1 APPLICATION(S): 20 OINTMENT TOPICAL at 06:12

## 2022-01-01 RX ADMIN — MIDODRINE HYDROCHLORIDE 20 MILLIGRAM(S): 2.5 TABLET ORAL at 22:17

## 2022-01-01 RX ADMIN — ATORVASTATIN CALCIUM 20 MILLIGRAM(S): 80 TABLET, FILM COATED ORAL at 21:57

## 2022-01-01 RX ADMIN — CHLORHEXIDINE GLUCONATE 15 MILLILITER(S): 213 SOLUTION TOPICAL at 05:03

## 2022-01-01 RX ADMIN — CHLORHEXIDINE GLUCONATE 15 MILLILITER(S): 213 SOLUTION TOPICAL at 05:34

## 2022-01-01 RX ADMIN — SENNA PLUS 2 TABLET(S): 8.6 TABLET ORAL at 22:56

## 2022-01-01 RX ADMIN — OLANZAPINE 5 MILLIGRAM(S): 15 TABLET, FILM COATED ORAL at 22:56

## 2022-01-01 RX ADMIN — MIDAZOLAM HYDROCHLORIDE 2 MILLIGRAM(S): 1 INJECTION, SOLUTION INTRAMUSCULAR; INTRAVENOUS at 23:20

## 2022-01-01 RX ADMIN — ATORVASTATIN CALCIUM 20 MILLIGRAM(S): 80 TABLET, FILM COATED ORAL at 21:05

## 2022-01-01 RX ADMIN — ATORVASTATIN CALCIUM 20 MILLIGRAM(S): 80 TABLET, FILM COATED ORAL at 21:07

## 2022-01-01 RX ADMIN — MUPIROCIN 1 APPLICATION(S): 20 OINTMENT TOPICAL at 17:24

## 2022-01-01 RX ADMIN — MUPIROCIN 1 APPLICATION(S): 20 OINTMENT TOPICAL at 17:20

## 2022-01-01 RX ADMIN — FENTANYL CITRATE 2.44 MICROGRAM(S)/KG/HR: 50 INJECTION INTRAVENOUS at 14:38

## 2022-01-01 RX ADMIN — FENTANYL CITRATE 2.44 MICROGRAM(S)/KG/HR: 50 INJECTION INTRAVENOUS at 20:56

## 2022-01-01 RX ADMIN — SODIUM CHLORIDE 1800 MILLILITER(S): 9 INJECTION, SOLUTION INTRAVENOUS at 11:30

## 2022-01-01 RX ADMIN — Medication 5 MILLIGRAM(S): at 13:58

## 2022-01-01 RX ADMIN — SODIUM CHLORIDE 75 MILLILITER(S): 9 INJECTION, SOLUTION INTRAVENOUS at 08:07

## 2022-01-01 RX ADMIN — MUPIROCIN 1 APPLICATION(S): 20 OINTMENT TOPICAL at 05:08

## 2022-01-01 RX ADMIN — PANTOPRAZOLE SODIUM 40 MILLIGRAM(S): 20 TABLET, DELAYED RELEASE ORAL at 12:21

## 2022-01-01 RX ADMIN — ENOXAPARIN SODIUM 40 MILLIGRAM(S): 100 INJECTION SUBCUTANEOUS at 17:11

## 2022-01-01 RX ADMIN — MUPIROCIN 1 APPLICATION(S): 20 OINTMENT TOPICAL at 17:42

## 2022-01-01 RX ADMIN — Medication 75 MICROGRAM(S): at 07:44

## 2022-01-01 RX ADMIN — CEFTRIAXONE 100 MILLIGRAM(S): 500 INJECTION, POWDER, FOR SOLUTION INTRAMUSCULAR; INTRAVENOUS at 15:54

## 2022-01-01 RX ADMIN — CHLORHEXIDINE GLUCONATE 1 APPLICATION(S): 213 SOLUTION TOPICAL at 05:10

## 2022-01-01 RX ADMIN — CHLORHEXIDINE GLUCONATE 15 MILLILITER(S): 213 SOLUTION TOPICAL at 05:10

## 2022-01-01 RX ADMIN — Medication 20 MILLIGRAM(S): at 15:10

## 2022-01-01 RX ADMIN — POLYETHYLENE GLYCOL 3350 17 GRAM(S): 17 POWDER, FOR SOLUTION ORAL at 07:47

## 2022-01-01 RX ADMIN — MUPIROCIN 1 APPLICATION(S): 20 OINTMENT TOPICAL at 05:50

## 2022-01-01 RX ADMIN — Medication 75 MICROGRAM(S): at 05:08

## 2022-01-01 RX ADMIN — CHLORHEXIDINE GLUCONATE 1 APPLICATION(S): 213 SOLUTION TOPICAL at 06:55

## 2022-01-01 RX ADMIN — PROPOFOL 2.92 MICROGRAM(S)/KG/MIN: 10 INJECTION, EMULSION INTRAVENOUS at 03:55

## 2022-01-01 RX ADMIN — FENTANYL CITRATE 2.44 MICROGRAM(S)/KG/HR: 50 INJECTION INTRAVENOUS at 02:06

## 2022-01-01 RX ADMIN — ENOXAPARIN SODIUM 40 MILLIGRAM(S): 100 INJECTION SUBCUTANEOUS at 15:21

## 2022-01-01 RX ADMIN — Medication 110 MILLIGRAM(S): at 18:19

## 2022-01-01 RX ADMIN — MUPIROCIN 1 APPLICATION(S): 20 OINTMENT TOPICAL at 18:39

## 2022-01-01 RX ADMIN — ENOXAPARIN SODIUM 40 MILLIGRAM(S): 100 INJECTION SUBCUTANEOUS at 14:39

## 2022-01-01 RX ADMIN — Medication 25 GRAM(S): at 10:16

## 2022-01-01 RX ADMIN — SCOPALAMINE 1 PATCH: 1 PATCH, EXTENDED RELEASE TRANSDERMAL at 05:13

## 2022-01-01 RX ADMIN — CHLORHEXIDINE GLUCONATE 1 APPLICATION(S): 213 SOLUTION TOPICAL at 05:14

## 2022-01-01 RX ADMIN — CHLORHEXIDINE GLUCONATE 1 APPLICATION(S): 213 SOLUTION TOPICAL at 05:12

## 2022-01-01 RX ADMIN — MIDAZOLAM HYDROCHLORIDE 2 MILLIGRAM(S): 1 INJECTION, SOLUTION INTRAMUSCULAR; INTRAVENOUS at 16:36

## 2022-01-01 RX ADMIN — ROBINUL 0.2 MILLIGRAM(S): 0.2 INJECTION INTRAMUSCULAR; INTRAVENOUS at 00:59

## 2022-01-01 RX ADMIN — FENTANYL CITRATE 2.44 MICROGRAM(S)/KG/HR: 50 INJECTION INTRAVENOUS at 01:43

## 2022-01-01 RX ADMIN — FENTANYL CITRATE 3 MICROGRAM(S)/KG/HR: 50 INJECTION INTRAVENOUS at 12:26

## 2022-01-01 RX ADMIN — MUPIROCIN 1 APPLICATION(S): 20 OINTMENT TOPICAL at 07:44

## 2022-01-01 RX ADMIN — OLANZAPINE 2.5 MILLIGRAM(S): 15 TABLET, FILM COATED ORAL at 22:13

## 2022-01-01 RX ADMIN — DEXMEDETOMIDINE HYDROCHLORIDE IN 0.9% SODIUM CHLORIDE 3 MICROGRAM(S)/KG/HR: 4 INJECTION INTRAVENOUS at 12:06

## 2022-01-01 RX ADMIN — Medication 2: at 21:59

## 2022-01-01 RX ADMIN — Medication 75 MICROGRAM(S): at 05:03

## 2022-01-01 RX ADMIN — FENTANYL CITRATE 25 MICROGRAM(S): 50 INJECTION INTRAVENOUS at 18:10

## 2022-01-01 RX ADMIN — CEFTRIAXONE 100 MILLIGRAM(S): 500 INJECTION, POWDER, FOR SOLUTION INTRAMUSCULAR; INTRAVENOUS at 15:06

## 2022-01-01 RX ADMIN — Medication 110 MILLIGRAM(S): at 05:04

## 2022-01-01 RX ADMIN — SENNA PLUS 2 TABLET(S): 8.6 TABLET ORAL at 22:02

## 2022-01-01 RX ADMIN — MUPIROCIN 1 APPLICATION(S): 20 OINTMENT TOPICAL at 05:04

## 2022-01-01 RX ADMIN — Medication 50 MILLIEQUIVALENT(S): at 07:50

## 2022-01-01 RX ADMIN — PANTOPRAZOLE SODIUM 40 MILLIGRAM(S): 20 TABLET, DELAYED RELEASE ORAL at 13:20

## 2022-01-01 RX ADMIN — SENNA PLUS 2 TABLET(S): 8.6 TABLET ORAL at 22:51

## 2022-01-01 RX ADMIN — ENOXAPARIN SODIUM 40 MILLIGRAM(S): 100 INJECTION SUBCUTANEOUS at 15:05

## 2022-01-01 RX ADMIN — PROPOFOL 2.92 MICROGRAM(S)/KG/MIN: 10 INJECTION, EMULSION INTRAVENOUS at 17:05

## 2022-01-01 RX ADMIN — ENOXAPARIN SODIUM 40 MILLIGRAM(S): 100 INJECTION SUBCUTANEOUS at 14:36

## 2022-01-01 RX ADMIN — FENTANYL CITRATE 2.44 MICROGRAM(S)/KG/HR: 50 INJECTION INTRAVENOUS at 22:05

## 2022-01-01 RX ADMIN — ENOXAPARIN SODIUM 40 MILLIGRAM(S): 100 INJECTION SUBCUTANEOUS at 17:57

## 2022-01-01 RX ADMIN — CHLORHEXIDINE GLUCONATE 15 MILLILITER(S): 213 SOLUTION TOPICAL at 06:11

## 2022-01-01 RX ADMIN — CHLORHEXIDINE GLUCONATE 15 MILLILITER(S): 213 SOLUTION TOPICAL at 17:32

## 2022-01-01 RX ADMIN — CEFEPIME 100 MILLIGRAM(S): 1 INJECTION, POWDER, FOR SOLUTION INTRAMUSCULAR; INTRAVENOUS at 06:16

## 2022-01-01 RX ADMIN — FENTANYL CITRATE 25 MICROGRAM(S): 50 INJECTION INTRAVENOUS at 23:05

## 2022-01-01 RX ADMIN — OLANZAPINE 2.5 MILLIGRAM(S): 15 TABLET, FILM COATED ORAL at 21:06

## 2022-01-01 RX ADMIN — Medication 20 MILLIGRAM(S): at 13:58

## 2022-01-01 RX ADMIN — ENOXAPARIN SODIUM 40 MILLIGRAM(S): 100 INJECTION SUBCUTANEOUS at 14:51

## 2022-01-01 RX ADMIN — CHLORHEXIDINE GLUCONATE 1 APPLICATION(S): 213 SOLUTION TOPICAL at 05:03

## 2022-01-01 RX ADMIN — Medication 75 MICROGRAM(S): at 07:16

## 2022-01-01 RX ADMIN — HYDROMORPHONE HYDROCHLORIDE 0.5 MILLIGRAM(S): 2 INJECTION INTRAMUSCULAR; INTRAVENOUS; SUBCUTANEOUS at 11:53

## 2022-01-01 RX ADMIN — Medication 100 GRAM(S): at 12:35

## 2022-01-01 RX ADMIN — Medication 2: at 08:35

## 2022-01-01 RX ADMIN — DEXMEDETOMIDINE HYDROCHLORIDE IN 0.9% SODIUM CHLORIDE 2.44 MICROGRAM(S)/KG/HR: 4 INJECTION INTRAVENOUS at 15:08

## 2022-01-01 RX ADMIN — CHLORHEXIDINE GLUCONATE 1 APPLICATION(S): 213 SOLUTION TOPICAL at 05:52

## 2022-01-01 RX ADMIN — MUPIROCIN 1 APPLICATION(S): 20 OINTMENT TOPICAL at 17:12

## 2022-01-01 RX ADMIN — CEFTRIAXONE 100 MILLIGRAM(S): 500 INJECTION, POWDER, FOR SOLUTION INTRAMUSCULAR; INTRAVENOUS at 15:19

## 2022-01-01 RX ADMIN — Medication 2.28 MICROGRAM(S)/KG/MIN: at 05:55

## 2022-01-01 RX ADMIN — CHLORHEXIDINE GLUCONATE 15 MILLILITER(S): 213 SOLUTION TOPICAL at 17:35

## 2022-01-01 RX ADMIN — FENTANYL CITRATE 2.44 MICROGRAM(S)/KG/HR: 50 INJECTION INTRAVENOUS at 03:37

## 2022-01-01 RX ADMIN — CHLORHEXIDINE GLUCONATE 15 MILLILITER(S): 213 SOLUTION TOPICAL at 17:56

## 2022-01-01 RX ADMIN — MIDODRINE HYDROCHLORIDE 20 MILLIGRAM(S): 2.5 TABLET ORAL at 05:01

## 2022-01-01 RX ADMIN — ENOXAPARIN SODIUM 40 MILLIGRAM(S): 100 INJECTION SUBCUTANEOUS at 15:07

## 2022-01-01 RX ADMIN — Medication 75 MICROGRAM(S): at 05:15

## 2022-01-01 RX ADMIN — MUPIROCIN 1 APPLICATION(S): 20 OINTMENT TOPICAL at 05:30

## 2022-01-01 RX ADMIN — MIDODRINE HYDROCHLORIDE 10 MILLIGRAM(S): 2.5 TABLET ORAL at 14:12

## 2022-01-01 RX ADMIN — SENNA PLUS 2 TABLET(S): 8.6 TABLET ORAL at 23:07

## 2022-01-01 RX ADMIN — MIDODRINE HYDROCHLORIDE 10 MILLIGRAM(S): 2.5 TABLET ORAL at 22:13

## 2022-01-01 RX ADMIN — PANTOPRAZOLE SODIUM 40 MILLIGRAM(S): 20 TABLET, DELAYED RELEASE ORAL at 11:05

## 2022-01-01 RX ADMIN — MUPIROCIN 1 APPLICATION(S): 20 OINTMENT TOPICAL at 18:07

## 2022-01-01 RX ADMIN — DEXMEDETOMIDINE HYDROCHLORIDE IN 0.9% SODIUM CHLORIDE 3 MICROGRAM(S)/KG/HR: 4 INJECTION INTRAVENOUS at 18:05

## 2022-01-01 RX ADMIN — MIDODRINE HYDROCHLORIDE 20 MILLIGRAM(S): 2.5 TABLET ORAL at 05:09

## 2022-01-01 RX ADMIN — CHLORHEXIDINE GLUCONATE 15 MILLILITER(S): 213 SOLUTION TOPICAL at 17:50

## 2022-01-01 RX ADMIN — PANTOPRAZOLE SODIUM 40 MILLIGRAM(S): 20 TABLET, DELAYED RELEASE ORAL at 12:54

## 2022-01-01 RX ADMIN — CHLORHEXIDINE GLUCONATE 1 APPLICATION(S): 213 SOLUTION TOPICAL at 05:50

## 2022-01-01 RX ADMIN — Medication 250 MILLIGRAM(S): at 17:22

## 2022-01-01 RX ADMIN — Medication 2.28 MICROGRAM(S)/KG/MIN: at 18:06

## 2022-01-01 RX ADMIN — Medication 20 MILLIGRAM(S): at 10:41

## 2022-01-01 RX ADMIN — CHLORHEXIDINE GLUCONATE 15 MILLILITER(S): 213 SOLUTION TOPICAL at 18:50

## 2022-01-01 RX ADMIN — CHLORHEXIDINE GLUCONATE 1 APPLICATION(S): 213 SOLUTION TOPICAL at 07:15

## 2022-01-01 RX ADMIN — MUPIROCIN 1 APPLICATION(S): 20 OINTMENT TOPICAL at 17:09

## 2022-01-01 RX ADMIN — POLYETHYLENE GLYCOL 3350 17 GRAM(S): 17 POWDER, FOR SOLUTION ORAL at 17:05

## 2022-01-01 RX ADMIN — CHLORHEXIDINE GLUCONATE 15 MILLILITER(S): 213 SOLUTION TOPICAL at 17:15

## 2022-01-01 RX ADMIN — MUPIROCIN 1 APPLICATION(S): 20 OINTMENT TOPICAL at 07:03

## 2022-01-01 RX ADMIN — Medication 2: at 12:30

## 2022-01-01 RX ADMIN — Medication 20 MILLIGRAM(S): at 05:11

## 2022-01-01 RX ADMIN — CHLORHEXIDINE GLUCONATE 15 MILLILITER(S): 213 SOLUTION TOPICAL at 17:23

## 2022-01-01 RX ADMIN — Medication 250 MILLIGRAM(S): at 06:57

## 2022-01-01 RX ADMIN — Medication 75 MICROGRAM(S): at 05:11

## 2022-01-01 RX ADMIN — Medication 75 MICROGRAM(S): at 05:05

## 2022-01-01 RX ADMIN — FENTANYL CITRATE 2.44 MICROGRAM(S)/KG/HR: 50 INJECTION INTRAVENOUS at 00:00

## 2022-01-01 RX ADMIN — Medication 110 MILLIGRAM(S): at 17:03

## 2022-01-01 RX ADMIN — Medication 20 MILLIGRAM(S): at 06:09

## 2022-01-01 RX ADMIN — CHLORHEXIDINE GLUCONATE 15 MILLILITER(S): 213 SOLUTION TOPICAL at 05:30

## 2022-01-01 RX ADMIN — Medication 2: at 18:40

## 2022-01-01 RX ADMIN — ROBINUL 0.2 MILLIGRAM(S): 0.2 INJECTION INTRAMUSCULAR; INTRAVENOUS at 18:59

## 2022-01-01 RX ADMIN — ATORVASTATIN CALCIUM 20 MILLIGRAM(S): 80 TABLET, FILM COATED ORAL at 22:56

## 2022-01-01 RX ADMIN — CEFTRIAXONE 100 MILLIGRAM(S): 500 INJECTION, POWDER, FOR SOLUTION INTRAMUSCULAR; INTRAVENOUS at 15:05

## 2022-01-01 RX ADMIN — CHLORHEXIDINE GLUCONATE 15 MILLILITER(S): 213 SOLUTION TOPICAL at 17:46

## 2022-01-01 RX ADMIN — Medication 2: at 16:22

## 2022-01-01 RX ADMIN — FENTANYL CITRATE 50 MICROGRAM(S): 50 INJECTION INTRAVENOUS at 18:46

## 2022-01-01 RX ADMIN — MIDAZOLAM HYDROCHLORIDE 1.2 MG/KG/HR: 1 INJECTION, SOLUTION INTRAMUSCULAR; INTRAVENOUS at 12:26

## 2022-01-01 RX ADMIN — SODIUM CHLORIDE 75 MILLILITER(S): 9 INJECTION, SOLUTION INTRAVENOUS at 00:49

## 2022-01-01 RX ADMIN — Medication 2.28 MICROGRAM(S)/KG/MIN: at 01:37

## 2022-01-01 RX ADMIN — ATORVASTATIN CALCIUM 20 MILLIGRAM(S): 80 TABLET, FILM COATED ORAL at 23:11

## 2022-01-01 RX ADMIN — SODIUM CHLORIDE 1500 MILLILITER(S): 9 INJECTION, SOLUTION INTRAVENOUS at 10:00

## 2022-01-01 RX ADMIN — MUPIROCIN 1 APPLICATION(S): 20 OINTMENT TOPICAL at 18:18

## 2022-01-01 RX ADMIN — ROBINUL 0.2 MILLIGRAM(S): 0.2 INJECTION INTRAMUSCULAR; INTRAVENOUS at 05:40

## 2022-01-01 RX ADMIN — CHLORHEXIDINE GLUCONATE 1 APPLICATION(S): 213 SOLUTION TOPICAL at 05:01

## 2022-01-01 RX ADMIN — DEXMEDETOMIDINE HYDROCHLORIDE IN 0.9% SODIUM CHLORIDE 3 MICROGRAM(S)/KG/HR: 4 INJECTION INTRAVENOUS at 20:03

## 2022-01-01 RX ADMIN — ENOXAPARIN SODIUM 40 MILLIGRAM(S): 100 INJECTION SUBCUTANEOUS at 15:41

## 2022-01-01 RX ADMIN — CEFEPIME 100 MILLIGRAM(S): 1 INJECTION, POWDER, FOR SOLUTION INTRAMUSCULAR; INTRAVENOUS at 22:15

## 2022-01-01 RX ADMIN — Medication 2: at 08:14

## 2022-01-01 RX ADMIN — POLYETHYLENE GLYCOL 3350 17 GRAM(S): 17 POWDER, FOR SOLUTION ORAL at 18:23

## 2022-01-01 RX ADMIN — Medication 3 PACKET(S): at 09:44

## 2022-01-01 RX ADMIN — FENTANYL CITRATE 3 MICROGRAM(S)/KG/HR: 50 INJECTION INTRAVENOUS at 04:51

## 2022-01-01 RX ADMIN — MUPIROCIN 1 APPLICATION(S): 20 OINTMENT TOPICAL at 06:06

## 2022-01-01 RX ADMIN — Medication 2: at 17:06

## 2022-01-01 RX ADMIN — ATORVASTATIN CALCIUM 20 MILLIGRAM(S): 80 TABLET, FILM COATED ORAL at 22:17

## 2022-01-01 RX ADMIN — Medication 2.28 MICROGRAM(S)/KG/MIN: at 03:00

## 2022-01-01 RX ADMIN — SCOPALAMINE 1 PATCH: 1 PATCH, EXTENDED RELEASE TRANSDERMAL at 06:00

## 2022-01-01 RX ADMIN — ROBINUL 0.2 MILLIGRAM(S): 0.2 INJECTION INTRAMUSCULAR; INTRAVENOUS at 11:53

## 2022-01-01 RX ADMIN — ATORVASTATIN CALCIUM 20 MILLIGRAM(S): 80 TABLET, FILM COATED ORAL at 23:07

## 2022-01-01 RX ADMIN — SCOPALAMINE 1 PATCH: 1 PATCH, EXTENDED RELEASE TRANSDERMAL at 00:39

## 2022-01-01 RX ADMIN — CHLORHEXIDINE GLUCONATE 15 MILLILITER(S): 213 SOLUTION TOPICAL at 05:09

## 2022-01-01 RX ADMIN — CHLORHEXIDINE GLUCONATE 15 MILLILITER(S): 213 SOLUTION TOPICAL at 18:07

## 2022-01-01 RX ADMIN — SENNA PLUS 2 TABLET(S): 8.6 TABLET ORAL at 22:13

## 2022-01-01 RX ADMIN — ENOXAPARIN SODIUM 40 MILLIGRAM(S): 100 INJECTION SUBCUTANEOUS at 16:29

## 2022-01-01 RX ADMIN — PANTOPRAZOLE SODIUM 40 MILLIGRAM(S): 20 TABLET, DELAYED RELEASE ORAL at 11:30

## 2022-01-01 RX ADMIN — Medication 2: at 22:55

## 2022-01-01 RX ADMIN — CHLORHEXIDINE GLUCONATE 1 APPLICATION(S): 213 SOLUTION TOPICAL at 05:31

## 2022-01-01 RX ADMIN — Medication 4: at 13:08

## 2022-01-01 RX ADMIN — MUPIROCIN 1 APPLICATION(S): 20 OINTMENT TOPICAL at 05:34

## 2022-01-01 RX ADMIN — SODIUM CHLORIDE 1500 MILLILITER(S): 9 INJECTION, SOLUTION INTRAVENOUS at 10:30

## 2022-01-01 RX ADMIN — Medication 110 MILLIGRAM(S): at 05:03

## 2022-01-01 RX ADMIN — CHLORHEXIDINE GLUCONATE 15 MILLILITER(S): 213 SOLUTION TOPICAL at 05:31

## 2022-01-01 RX ADMIN — CHLORHEXIDINE GLUCONATE 15 MILLILITER(S): 213 SOLUTION TOPICAL at 05:49

## 2022-01-01 RX ADMIN — Medication 25 GRAM(S): at 13:11

## 2022-01-01 RX ADMIN — Medication 20 MILLIGRAM(S): at 14:16

## 2022-01-01 RX ADMIN — MIDODRINE HYDROCHLORIDE 20 MILLIGRAM(S): 2.5 TABLET ORAL at 21:22

## 2022-01-01 RX ADMIN — FENTANYL CITRATE 2.44 MICROGRAM(S)/KG/HR: 50 INJECTION INTRAVENOUS at 01:45

## 2022-01-01 RX ADMIN — Medication 75 MICROGRAM(S): at 06:09

## 2022-01-01 RX ADMIN — CEFEPIME 100 MILLIGRAM(S): 1 INJECTION, POWDER, FOR SOLUTION INTRAMUSCULAR; INTRAVENOUS at 12:13

## 2022-01-01 RX ADMIN — Medication 20 MILLIGRAM(S): at 13:53

## 2022-01-01 RX ADMIN — Medication 2: at 23:08

## 2022-01-01 RX ADMIN — LACTULOSE 10 GRAM(S): 10 SOLUTION ORAL at 05:39

## 2022-01-01 RX ADMIN — ENOXAPARIN SODIUM 40 MILLIGRAM(S): 100 INJECTION SUBCUTANEOUS at 15:45

## 2022-01-01 RX ADMIN — DEXMEDETOMIDINE HYDROCHLORIDE IN 0.9% SODIUM CHLORIDE 2.44 MICROGRAM(S)/KG/HR: 4 INJECTION INTRAVENOUS at 15:06

## 2022-01-01 RX ADMIN — CHLORHEXIDINE GLUCONATE 15 MILLILITER(S): 213 SOLUTION TOPICAL at 07:15

## 2022-01-01 RX ADMIN — POLYETHYLENE GLYCOL 3350 17 GRAM(S): 17 POWDER, FOR SOLUTION ORAL at 18:06

## 2022-01-01 RX ADMIN — Medication 40 MILLIGRAM(S): at 10:47

## 2022-01-01 RX ADMIN — CHLORHEXIDINE GLUCONATE 15 MILLILITER(S): 213 SOLUTION TOPICAL at 17:22

## 2022-01-01 RX ADMIN — MUPIROCIN 1 APPLICATION(S): 20 OINTMENT TOPICAL at 05:27

## 2022-01-01 RX ADMIN — POLYETHYLENE GLYCOL 3350 17 GRAM(S): 17 POWDER, FOR SOLUTION ORAL at 05:08

## 2022-01-01 RX ADMIN — CHLORHEXIDINE GLUCONATE 15 MILLILITER(S): 213 SOLUTION TOPICAL at 17:12

## 2022-01-01 RX ADMIN — ENOXAPARIN SODIUM 40 MILLIGRAM(S): 100 INJECTION SUBCUTANEOUS at 14:35

## 2022-01-01 RX ADMIN — DEXMEDETOMIDINE HYDROCHLORIDE IN 0.9% SODIUM CHLORIDE 2.44 MICROGRAM(S)/KG/HR: 4 INJECTION INTRAVENOUS at 06:08

## 2022-01-01 RX ADMIN — CHLORHEXIDINE GLUCONATE 15 MILLILITER(S): 213 SOLUTION TOPICAL at 05:29

## 2022-01-01 RX ADMIN — ENOXAPARIN SODIUM 40 MILLIGRAM(S): 100 INJECTION SUBCUTANEOUS at 14:49

## 2022-01-01 RX ADMIN — Medication 50 MILLIEQUIVALENT(S): at 08:20

## 2022-01-01 RX ADMIN — CHLORHEXIDINE GLUCONATE 15 MILLILITER(S): 213 SOLUTION TOPICAL at 05:40

## 2022-01-01 RX ADMIN — CHLORHEXIDINE GLUCONATE 1 APPLICATION(S): 213 SOLUTION TOPICAL at 06:47

## 2022-01-01 RX ADMIN — Medication 110 MILLIGRAM(S): at 05:16

## 2022-01-01 RX ADMIN — CHLORHEXIDINE GLUCONATE 15 MILLILITER(S): 213 SOLUTION TOPICAL at 05:15

## 2022-01-01 RX ADMIN — Medication 110 MILLIGRAM(S): at 06:43

## 2022-01-01 RX ADMIN — Medication 2: at 07:41

## 2022-01-01 RX ADMIN — Medication 2: at 16:54

## 2022-01-01 RX ADMIN — Medication 50 MILLIEQUIVALENT(S): at 09:18

## 2022-01-01 RX ADMIN — Medication 110 MILLIGRAM(S): at 17:32

## 2022-01-01 RX ADMIN — Medication 110 MILLIGRAM(S): at 05:30

## 2022-01-01 RX ADMIN — MUPIROCIN 1 APPLICATION(S): 20 OINTMENT TOPICAL at 17:26

## 2022-01-01 RX ADMIN — ATORVASTATIN CALCIUM 20 MILLIGRAM(S): 80 TABLET, FILM COATED ORAL at 22:13

## 2022-01-01 RX ADMIN — Medication 2 MILLIGRAM(S): at 11:52

## 2022-01-01 RX ADMIN — MUPIROCIN 1 APPLICATION(S): 20 OINTMENT TOPICAL at 06:47

## 2022-01-01 RX ADMIN — LACTULOSE 10 GRAM(S): 10 SOLUTION ORAL at 18:23

## 2022-01-01 RX ADMIN — PANTOPRAZOLE SODIUM 40 MILLIGRAM(S): 20 TABLET, DELAYED RELEASE ORAL at 11:52

## 2022-01-01 RX ADMIN — Medication 110 MILLIGRAM(S): at 06:15

## 2022-01-01 RX ADMIN — Medication 2: at 22:03

## 2022-01-01 RX ADMIN — Medication 110 MILLIGRAM(S): at 06:10

## 2022-01-01 RX ADMIN — Medication 75 MICROGRAM(S): at 06:17

## 2022-01-01 RX ADMIN — SENNA PLUS 2 TABLET(S): 8.6 TABLET ORAL at 23:11

## 2022-01-01 RX ADMIN — SODIUM CHLORIDE 75 MILLILITER(S): 9 INJECTION, SOLUTION INTRAVENOUS at 15:45

## 2022-01-01 RX ADMIN — FENTANYL CITRATE 25 MICROGRAM(S): 50 INJECTION INTRAVENOUS at 12:27

## 2022-01-01 RX ADMIN — SCOPALAMINE 1 PATCH: 1 PATCH, EXTENDED RELEASE TRANSDERMAL at 08:40

## 2022-01-01 RX ADMIN — FENTANYL CITRATE 2.44 MICROGRAM(S)/KG/HR: 50 INJECTION INTRAVENOUS at 05:40

## 2022-01-01 RX ADMIN — OLANZAPINE 2.5 MILLIGRAM(S): 15 TABLET, FILM COATED ORAL at 22:51

## 2022-09-07 NOTE — CONSULT NOTE ADULT - SUBJECTIVE AND OBJECTIVE BOX
Patient is a 89y old  Female who presents with a chief complaint of     HPI: 89-year-old female with past medical history of hypothyroidism, dementia, hypertension who presents with hypoxia.  Patient is unable to provide any HPI.  Information has been provided by EMS.  Per EMS, they were called on scene for shortness of breath and hypoxia.  O2 on scene was in the 50s. Spoke with daughter and was told that patient was admitted to rehab for left femur fracture that happened.  Rest of HPI limited.      PAST MEDICAL & SURGICAL HISTORY:  HTN (hypertension)      High cholesterol      Hypothyroid      History of colon resection      H/O: hysterectomy          SOCIAL HX:   unable to obtain    FAMILY HISTORY:  :  No known cardiovascular family history     Review Of Systems:     All ROS are negative except per HPI       Allergies    No Known Allergies    Intolerances          PHYSICAL EXAM    ICU Vital Signs Last 24 Hrs  T(C): 34 (07 Sep 2022 13:30), Max: 35.7 (07 Sep 2022 11:56)  T(F): 93.2 (07 Sep 2022 13:30), Max: 96.3 (07 Sep 2022 11:56)  HR: 84 (07 Sep 2022 13:30) (73 - 130)  BP: 133/77 (07 Sep 2022 13:30) (128/58 - 171/84)  BP(mean): 99 (07 Sep 2022 13:30) (84 - 99)  RR: 14 (07 Sep 2022 13:30) (12 - 20)  SpO2: 100% (07 Sep 2022 13:30) (99% - 100%)    O2 Parameters below as of 07 Sep 2022 13:30  Patient On (Oxygen Delivery Method): ventilator    O2 Concentration (%): 100        CONSTITUTIONAL:  Well nourished.  NAD    ENT:   Airway patent,   Mouth with normal mucosa.   No thrush    EYES:   pupils equal,   round and reactive to light.    CARDIAC:   Normal rate,   Regular rhythm.    Heart sounds S1, S2.   No edema    RESPIRATORY:   No wheezing   Normal chest expansion  No use of accessory muscles    GASTROINTESTINAL:  Abdomen soft   Non-tender,   No guarding,   + BS    MUSCULOSKELETAL:   Range of motion is not limited,  No clubbing, cyanosis    NEUROLOGICAL:   nonverbal    SKIN:   Skin normal color for race,   Warm and dry  No evidence of rash.    PSYCHIATRIC:   Not awake              LABS:                          13.2   14.96 )-----------( 403      ( 07 Sep 2022 11:35 )             43.4                                               09-07    142  |  101  |  38<H>  ----------------------------<  245<H>  4.7   |  32  |  0.9    Ca    9.5      07 Sep 2022 11:35  Mg     2.4         TPro  7.2  /  Alb  4.4  /  TBili  0.5  /  DBili  x   /  AST  23  /  ALT  16  /  AlkPhos  156<H>                                               Urinalysis Basic - ( 07 Sep 2022 13:33 )    Color: Colorless / Appearance: Clear / S.018 / pH: x  Gluc: x / Ketone: Negative  / Bili: Negative / Urobili: <2 mg/dL   Blood: x / Protein: Trace / Nitrite: Negative   Leuk Esterase: Negative / RBC: x / WBC x   Sq Epi: x / Non Sq Epi: x / Bacteria: x        CARDIAC MARKERS ( 07 Sep 2022 11:35 )  x     / 0.04 ng/mL / x     / x     / x                                                LIVER FUNCTIONS - ( 07 Sep 2022 11:35 )  Alb: 4.4 g/dL / Pro: 7.2 g/dL / ALK PHOS: 156 U/L / ALT: 16 U/L / AST: 23 U/L / GGT: x                                                                                               Mode: AC/ CMV (Assist Control/ Continuous Mandatory Ventilation)  RR (machine): 18  TV (machine): 450  FiO2: 100  PEEP: 5  ITime: 1  MAP: 8  PIP: 22                                      ABG - ( 07 Sep 2022 12:22 )  pH, Arterial: 7.09  pH, Blood: x     /  pCO2: 93    /  pO2: 51    / HCO3: 28    / Base Excess: -4.1  /  SaO2: 85.5                X-Rays reviewed    CXR interpreted by me:    MEDICATIONS  (STANDING):  fentaNYL   Infusion. 0.5 MICROgram(s)/kG/Hr (3 mL/Hr) IV Continuous <Continuous>  midazolam Infusion 0.02 mG/kG/Hr (1.2 mL/Hr) IV Continuous <Continuous>    MEDICATIONS  (PRN):

## 2022-09-07 NOTE — CONSULT NOTE ADULT - ATTENDING COMMENTS
Patient seen and examined and agree with above except as noted.  Patients history, notes, labs, imaging, vitals and meds reviewed personally.  Patient intubated sedation (Fentanyl recently turned off) able to follow commands, power symmetric in UE and LE b/l, tracks horizontally, pupils pin point    Patients presentation likely hypercapnia induced lethargy obtundation with improvement in mental status following improvement of CO2.    Plan   1. If neurologically improves to baseline no further neurological workup

## 2022-09-07 NOTE — H&P ADULT - NSHPLABSRESULTS_GEN_ALL_CORE
LABS:  cret                        13.2   14.96 )-----------( 403      ( 07 Sep 2022 11:35 )             43.4     09-07    142  |  101  |  38<H>  ----------------------------<  245<H>  4.7   |  32  |  0.9    Ca    9.5      07 Sep 2022 11:35  Mg     2.4     09-07    TPro  7.2  /  Alb  4.4  /  TBili  0.5  /  DBili  x   /  AST  23  /  ALT  16  /  AlkPhos  156<H>  09-07    imaging:   - CXR: wet read: wnl   - CT angio chest PE: no PE, lungs wnl   - CT head: No acute intracranial pathology. No evidence of midline shift, mass effect or intracranial hemorrhage.

## 2022-09-07 NOTE — GOALS OF CARE CONVERSATION - ADVANCED CARE PLANNING - CONVERSATION DETAILS
Spoke to daughter Becky and updated on patient's status and plan. She notes that patient does not have a healthcare proxy as patient was previously independent and AAOx3 at baseline.     Becky lives in [place] and says her sister Matilde Cruz lives in [place] and should be the first point of contact ([phone]).     Becky wishes for her mother to be full code with full medical interventions including antibiotics, feeding tubes, central lines, and CPR/chest compressions. She wants her mother to get better as she was previously healthy aside from her hip fracture.

## 2022-09-07 NOTE — CONSULT NOTE ADULT - SUBJECTIVE AND OBJECTIVE BOX
Neurology Consult Note    HPI:  88 yo F w/ PMHx of HTN, HLD, hypothyroidism, recent left femur fracture s/p surgery 1mo in [facility] presented to ED from [facility] with hypoxia and hypotension. Patient was saturating 50% on room air and improved only minimally on NRB in the ED. History was not able to be obtained by the patient as she was unresponsive and she was intubated for airway protection. Collateral HPI obtained by daughter Becky who said she was sent to the NH for rehab for her hip fracture. She denies any history of dementia. Baseline mental status is AAOx3 and prior to hip fracture patient was living by herself.     Of note patient was being treated in NH for R sided pneumonia (CXR : R perihilar infiltrate) and was receiving levaquin.     ED course:  vitals: /84, HR 73, hypothermic T 93s, O2 100% on BVM  labs: WBC 14.96, trop 0.04, , ABG pH 7.09, PCO2 93, PO2 51  imaging:   - CXR: wet read: wnl   - CT angio chest PE: no PE, lungs wnl   - CT head: No acute intracranial pathology. No evidence of midline shift, mass effect or intracranial hemorrhage.  given: vanc, cefepime, LR 1800ml  (07 Sep 2022 15:40)      PAST MEDICAL & SURGICAL HISTORY:  HTN (hypertension)  High cholesterol  Hypothyroid  History of colon resection  H/O: hysterectomy      Medications:  acetaminophen     Tablet .. 650 milliGRAM(s) Oral every 6 hours PRN  atorvastatin 20 milliGRAM(s) Oral at bedtime  cefepime   IVPB 2000 milliGRAM(s) IV Intermittent every 8 hours  chlorhexidine 0.12% Liquid 15 milliLiter(s) Oral Mucosa two times a day  chlorhexidine 2% Cloths 1 Application(s) Topical <User Schedule>  dexMEDEtomidine Infusion 0.2 MICROgram(s)/kG/Hr IV Continuous <Continuous>  enoxaparin Injectable 40 milliGRAM(s) SubCutaneous every 24 hours  fentaNYL   Infusion. 0.5 MICROgram(s)/kG/Hr IV Continuous <Continuous>  lactated ringers. 1000 milliLiter(s) IV Continuous <Continuous>  levothyroxine 75 MICROGram(s) Oral daily  pantoprazole  Injectable 40 milliGRAM(s) IV Push daily  vancomycin  IVPB 1000 milliGRAM(s) IV Intermittent every 12 hours      Vital Signs Last 24 Hrs  T(C): 37.1 (07 Sep 2022 17:00), Max: 37.1 (07 Sep 2022 17:00)  T(F): 98.8 (07 Sep 2022 17:00), Max: 98.8 (07 Sep 2022 17:00)  HR: 112 (07 Sep 2022 17:00) (73 - 130)  BP: 132/67 (07 Sep 2022 17:00) (96/60 - 171/84)  BP(mean): 91 (07 Sep 2022 17:00) (74 - 99)  RR: 16 (07 Sep 2022 17:00) (12 - 20)  SpO2: 100% (07 Sep 2022 17:00) (99% - 100%)    Parameters below as of 07 Sep 2022 17:00  Patient On (Oxygen Delivery Method): ventilator    O2 Concentration (%): 100      Neurological Exam:   Mental status: Intubated, not sedated. Not following commands. Eyes open spontaneously.  Cranial nerves: Pupils equally round and reactive to light, face symmetric, tongue was midline.  Motor: Normal tone and bulk. Finger twitching seen in both hands.  Sensation: Does not withdraw to noxious stimuli.  Coordination: Unable to assess.  Reflexes: 2+ in bilateral UE/LE, downgoing toes bilaterally.  Gait: Deferred.      Labs:  CBC Full  -  ( 07 Sep 2022 11:35 )  WBC Count : 14.96 K/uL  RBC Count : 4.48 M/uL  Hemoglobin : 13.2 g/dL  Hematocrit : 43.4 %  Platelet Count - Automated : 403 K/uL  Mean Cell Volume : 96.9 fL  Mean Cell Hemoglobin : 29.5 pg  Mean Cell Hemoglobin Concentration : 30.4 g/dL  Auto Neutrophil # : 13.21 K/uL  Auto Lymphocyte # : 1.10 K/uL  Auto Monocyte # : 0.43 K/uL  Auto Eosinophil # : 0.01 K/uL  Auto Basophil # : 0.03 K/uL  Auto Neutrophil % : 88.2 %  Auto Lymphocyte % : 7.4 %  Auto Monocyte % : 2.9 %  Auto Eosinophil % : 0.1 %  Auto Basophil % : 0.2 %        142  |  101  |  38<H>  ----------------------------<  245<H>  4.7   |  32  |  0.9    Ca    9.5      07 Sep 2022 11:35  Mg     2.4         TPro  7.2  /  Alb  4.4  /  TBili  0.5  /  DBili  x   /  AST  23  /  ALT  16  /  AlkPhos  156<H>      LIVER FUNCTIONS - ( 07 Sep 2022 11:35 )  Alb: 4.4 g/dL / Pro: 7.2 g/dL / ALK PHOS: 156 U/L / ALT: 16 U/L / AST: 23 U/L / GGT: x             Urinalysis Basic - ( 07 Sep 2022 13:33 )    Color: Colorless / Appearance: Clear / S.018 / pH: x  Gluc: x / Ketone: Negative  / Bili: Negative / Urobili: <2 mg/dL   Blood: x / Protein: Trace / Nitrite: Negative   Leuk Esterase: Negative / RBC: x / WBC x   Sq Epi: x / Non Sq Epi: x / Bacteria: x    < from: CT Head No Cont (22 @ 13:00) >  PROCEDURE DATE:  2022          INTERPRETATION:  CLINICAL INDICATION: Altered mental status.    Technique: CT of the head was performed without contrast.    Multiple contiguous axial images were acquired from the skullbase to the   vertex without the administration of intravenous contrast.  Coronal and   sagittal reformations were made.    COMPARISON: None    FINDINGS:    The ventricles and sulci are unremarkable in appearance.    There is periventricular white matter hypodensity. There is no   intraparenchymal hematoma, mass effect or midline shift. No abnormal   extra-axial fluid collections are present.    The calvarium is intact. There is patchy opacification of the left   mastoid tip air cells. The tympanomastoid cavities are otherwise   unremarkable. The visualized paranasal sinuses are unremarkable. The   visualized orbits are unremarkable.    IMPRESSION:  No acute intracranial pathology. No evidence of midline shift, mass   effect or intracranial hemorrhage.    Mild chronic microvascular type changes.    --- End of Report ---    < end of copied text >

## 2022-09-07 NOTE — ED ADULT TRIAGE NOTE - CHIEF COMPLAINT QUOTE
Pt BIBA for respiratory distress. Pt unresponsive on arrival. Pt BIBA for respiratory distress. As per EMS, pt satting 50% at nursing home. Pt unresponsive on arrival.

## 2022-09-07 NOTE — ED PROVIDER NOTE - OBJECTIVE STATEMENT
89-year-old female with past medical history of hypothyroidism, dementia, hypertension who presents with hypoxia.  Patient is unable to provide any HPI.  Information has been provided by EMS.  Per EMS, they were called on scene for shortness of breath and hypoxia.  O2 on scene was in the 50s. Spoke with daughter and was told that patient was admitted to rehab for left femur fracture that happened.  Rest of HPI limited.

## 2022-09-07 NOTE — ED PROCEDURE NOTE - CPROC ED TIME OUT STATEMENT1
“Patient's name, , procedure and correct site were confirmed during the [place] Timeout.”
“Patient's name, , procedure and correct site were confirmed during the [place] Timeout.”

## 2022-09-07 NOTE — H&P ADULT - ASSESSMENT
IMPRESSION:  Altered mental status   Acute hypercapnic respiratory failure  Acute hypoxemic respiratory failure  Sepsis present on admission (hypothermia/leukocytosis)  HO R sided CAP in NH tx w/ levaquin  HO HTN/HLD/hypothyroidism  HO left femur fracture s/p surgery in [facility]     PLAN:    CNS: CT head no acute changes. No mental status on SAT. Sedate with fentanyl and precedex if needed. EEG. Neuro eval.    HEENT: Oral care    PULMONARY: CTA chest no PE, no opacities. HOB @ 45 degrees. Goal O2 saturation 92-96%. Vent changes as follows: Decrease FiO2 to 60%. Repeat ABG    CARDIOVASCULAR: Goal directed fluid resuscitation. LR 75cc/hr. Trend trops. Echo.    GI: NPO for now     RENAL: Follow up lytes. Correct as needed    INFECTIOUS DISEASE: Vancomycin and cefepime for now. Follow up cultures. Nasal MRSA. Procal.    HEMATOLOGICAL: DVT prophylaxis: Lovenox    ENDOCRINE: Follow up FS. Insulin protocol if needed. Continue home meds    MUSCULOSKELETAL: Bed rest    DISPO: MICU

## 2022-09-07 NOTE — ED ADULT NURSE REASSESSMENT NOTE - NS ED NURSE REASSESS COMMENT FT1
Patient intubated upon arrival for unresponsiveness. Patient intubated at 1124. Lipline 22. Tube size 7.5.

## 2022-09-07 NOTE — ED ADULT NURSE REASSESSMENT NOTE - NS ED NURSE REASSESS COMMENT FT1
Patient more arousable, twitching and biting on tubes. RASS -3. ICU resident made aware to change sedation order set for a RASS of -3/-4, as patient was never 0 RASS.

## 2022-09-07 NOTE — CONSULT NOTE ADULT - ASSESSMENT
This is an 89 year old female with past medical history of hypothyroidism, dementia, hypertension who presented with hypoxic and hypercapnic respiratory failure, s/p intubation. Patient was altered and unresponsive upon arrival to the ED. Neurology was consulted for evaluation of altered mental status. Likely due to hypercapnia and CO2 narcosis.    Plan:  - CT Head reviewed, no acute abnormalities seen  - Agree with REEG, will f/u  - Try to wean sedation as tolerated  - If patients mental status does not return to baseline, can consider LP  - Neurology will continue to follow  - Continue rest of care as per primary team This is an 89 year old female with past medical history of hypothyroidism, dementia, hypertension who presented with hypoxic and hypercapnic respiratory failure, s/p intubation. Patient was altered and unresponsive upon arrival to the ED. Neurology was consulted for evaluation of altered mental status, likely due to hypercapnia and CO2 narcosis. Patient being admitted to ICU.    Plan:  - CT Head reviewed, no acute abnormalities seen  - Agree with REEG, will f/u  - Try to wean sedation as tolerated  - If patients mental status does not return to baseline, can consider LP  - Neurology will continue to follow  - Continue rest of care as per primary team

## 2022-09-07 NOTE — ED PROVIDER NOTE - CLINICAL SUMMARY MEDICAL DECISION MAKING FREE TEXT BOX
89-year-old female with a past medical history of dementia hypothyroid hypertension hyperlipidemia brought in from nursing home by EMS in respiratory distress as per EMS patient was found satting 59 on room air improved to 80s on nonrebreather had an x-ray done at the facility on September 6 which demonstrated right perihilar infiltrate upon arrival to the ED patient unresponsive fingerstick performed found to be 223 patient placed on monitor IV access obtained patient intubated for airway protection.  VS reviewed labs imaging ekg obtained and reviewed, cta demonstrated no pe, icu consulted patient admitted

## 2022-09-07 NOTE — ED PROVIDER NOTE - PHYSICAL EXAMINATION
CONSTITUTIONAL: Unresponsive.   HEAD: Normocephalic; atraumatic.   RESPIRATORY: Labored breathing. Crackles noticed in all lobes.   CARDIOVASCULAR: Regular rate and rhythm.   GI: Abdomen is soft, non-tender, and without distention. Bowel sounds are present and normoactive in all four quadrants. No masses are noted.   NEURO: A & O x 0

## 2022-09-07 NOTE — ED PROCEDURE NOTE - NS ED ATTENDING STATEMENT MOD
This was a shared visit with the RAD. I reviewed and verified the documentation and independently performed the documented:
This was a shared visit with the RAD. I reviewed and verified the documentation and independently performed the documented:

## 2022-09-07 NOTE — CONSULT NOTE ADULT - ATTENDING COMMENTS
IMPRESSION:    Acute hypercapnic respiratory failure  Acute hypoxemic respiratory failure  H/o Hypothyroidism  Altered mental status     Impression and plan are my own.

## 2022-09-07 NOTE — H&P ADULT - NSHPPHYSICALEXAM_GEN_ALL_CORE
PHYSICAL EXAM:  GENERAL: NAD, lying in bed comfortably  HEAD:  Atraumatic, Normocephalic  EYES: EOMI, PERRLA, conjunctiva and sclera clear  ENT: Moist mucous membranes  NECK: Supple, No JVD  CHEST/LUNG: Clear to auscultation bilaterally; No rales, rhonchi, wheezing, or rubs. Unlabored respirations  HEART: Regular rate and rhythm; No murmurs, rubs, or gallops  ABDOMEN: Bowel sounds present; Soft, Nontender, Nondistended. No hepatomegally  EXTREMITIES:  2+ Peripheral Pulses, brisk capillary refill. No clubbing, cyanosis, or edema  NERVOUS SYSTEM:  no focal deficits, not following commands   MSK: FROM all 4 extremities, full and equal strength  SKIN: No rashes or lesions

## 2022-09-07 NOTE — H&P ADULT - HISTORY OF PRESENT ILLNESS
88yo F w/ pmhx of HTN, HLD, hypothyroidism, recent left femur fracture s/p surgery 1mo in [facility] presented to ED from [facility] with hypoxia and hypotension. Patient was saturating 50% on room air and improved only minimally on NRB in the ED. History was not able to be obtained by the patient as she was unresponsive and she was intubated for airway protection. Collateral HPI obtained by daughter Becky who said she was sent to the NH for rehab for her hip fracture. She denies any history of dementia. Baseline mental status is AAOx3 and prior to hip fracture patient was living by herself.     Of note patient was being treated in NH for R sided pneumonia (CXR 9/6: R perihilar infiltrate) and was receiving levaquin.     ED course:  vitals: /84, HR 73, hypothermic T 93s, O2 100% on BVM  labs: WBC 14.96, trop 0.04, , ABG pH 7.09, PCO2 93, PO2 51  imaging:   - CXR: wet read: wnl   - CT angio chest PE: no PE, lungs wnl   - CT head: No acute intracranial pathology. No evidence of midline shift, mass effect or intracranial hemorrhage.  given: vanc, cefepime, LR 1800ml

## 2022-09-07 NOTE — ED PROVIDER NOTE - CRITICAL CARE ATTENDING CONTRIBUTION TO CARE
89-year-old female with a past medical history of dementia hypothyroid hypertension hyperlipidemia brought in from nursing home by EMS in respiratory distress as per EMS patient was found satting 59 on room air improved to 80s on nonrebreather had an x-ray done at the facility on September 6 which demonstrated right perihilar infiltrate upon arrival to the ED patient unresponsive fingerstick performed found to be 223 patient placed on monitor IV access obtained patient intubated for airway protection.    CONSTITUTIONAL: respiratory distress   HEAD: NCAT  EYES: PERRL; EOMI;   ENT: Normal pharynx; mucous membranes pink/moist, no erythema.  NECK: Supple;   CARD: RRR; nl S1/S2; no M/R/G. Pulses equal bilaterally.  RESP: hypoxic on room air  ABD: Soft, NT ND   MSK/EXT: No gross deformities;  SKIN: Warm and dry;   NEURO: non responsive

## 2022-09-07 NOTE — CONSULT NOTE ADULT - ASSESSMENT
IMPRESSION:  Acute hypercapnic respiratory failure  Acute hypoxemic respiratory failure  H/o Hypothyroidism    PLAN:    CNS: CT head no acute changes. Spontaneous awakening trial. Sedate with fentanyl and precedex. EEG.    HEENT: Oral care    PULMONARY: CTA chest no PE, no opacities. HOB @ 45 degrees. Goal O2 saturation 92-96%. Vent changes as follows: Decrease FiO2 to 60%. Repeat ABG    CARDIOVASCULAR: Goal directed fluid resuscitation. Trend trops    GI: Feeding through OG tube. Bowel regimen     RENAL: Follow up lytes. Correct as needed    INFECTIOUS DISEASE: Vancomycin and cefepime for now. Follow up cultures. Obtain nasal MRSA and procalcitonin.    HEMATOLOGICAL: DVT prophylaxis: Lovenox    ENDOCRINE: Follow up FS. Insulin protocol if needed. Continue home meds    MUSCULOSKELETAL: Bed rest    DISPO: MICU         IMPRESSION:    Acute hypercapnic respiratory failure  Acute hypoxemic respiratory failure  H/o Hypothyroidism  Altered mental status     PLAN:    CNS: CT head no acute changes. Spontaneous awakening trial. Sedation with predecex infusion; Fentanyl as needed. Send UDS SDS. Check routine EEG. Daily SATs. If remains altered despite being off sedation then would consider LP.     HEENT: Oral care; ETT care    PULMONARY: CTA chest no PE, no opacities or consolidations. HOB @ 45 degrees. Goal O2 saturation 92-96%. Vent changes as follows: TV 6cc/kg IBW; RR 14; Fio2 50%; PEEP of 5. Repeat ABG after these vent changes.     CARDIOVASCULAR: Goal directed fluid resuscitation. Trend trops. Keep MAP more than 65mmhg.     GI: Feeding through OG tube. Bowel regimen. Start GI prophylaxis.     RENAL: Follow up lytes. Correct as needed. Patrick  catheter.     INFECTIOUS DISEASE: Leukocytosis noted. UA negative. CT chest no acute infiltrates. S/P Cefepime and Vancomycin in the ED. De-escalate based on culture results.     HEMATOLOGICAL: DVT prophylaxis: Lovenox    ENDOCRINE: Follow up FS. Insulin protocol if needed. Continue home meds. Check TSH.     MUSCULOSKELETAL: Bed rest    DISPO: Critically ill and needs ICU level of care.          IMPRESSION:    Acute hypercapnic respiratory failure  Acute hypoxemic respiratory failure  H/o Hypothyroidism  Altered mental status   SIRS    PLAN:    CNS: CT head no acute changes. Spontaneous awakening trial. Sedation with predecex infusion; Fentanyl as needed. Send UDS SDS. Check routine EEG. Daily SATs. If remains altered despite being off sedation then would consider LP.     HEENT: Oral care; ETT care    PULMONARY: CTA chest no PE, no opacities or consolidations. HOB @ 45 degrees. Goal O2 saturation 92-96%. Vent changes as follows: TV 6cc/kg IBW; RR 14; Fio2 50%; PEEP of 5. Repeat ABG after these vent changes.     CARDIOVASCULAR: Goal directed fluid resuscitation. Trend trops. Keep MAP more than 65mmhg.     GI: Feeding through OG tube. Bowel regimen. Start GI prophylaxis.     RENAL: Follow up lytes. Correct as needed. Patrick  catheter.     INFECTIOUS DISEASE: Leukocytosis noted. UA negative. CT chest no acute infiltrates. S/P Cefepime and Vancomycin in the ED. De-escalate based on culture results.     HEMATOLOGICAL: DVT prophylaxis: Lovenox    ENDOCRINE: Follow up FS. Insulin protocol if needed. Continue home meds. Check TSH.     MUSCULOSKELETAL: Bed rest    DISPO: Critically ill and needs ICU level of care.

## 2022-09-08 NOTE — PHARMACOTHERAPY INTERVENTION NOTE - COMMENTS
Pt is in on vancomycin for empiric PNA coverage. Since CrCl is 36 (using actual body weight 48.7 kg and Scr=0.8) and concern for nephrotoxicity, recommended to hold vancomycin doses and obtain a vancomycin trough level at 17:00 today. Team will hold vancomycin until trough level is available and will adjust dosing accordingly.

## 2022-09-08 NOTE — PROGRESS NOTE ADULT - ASSESSMENT
IMPRESSION:    Acute hypercapnic respiratory failure  Acute hypoxemic respiratory failure  THERESA with decreased urine output  H/o Hypothyroidism  Altered mental status   SIRS    PLAN:    CNS: CT head no acute changes. Spontaneous awakening trial. Sedation with predecex infusion; Fentanyl as needed. Send UDS SDS. Routine EEG no signs of seizure activity. Daily SATs. Now following commands. Neuro following.    HEENT: Oral care; ETT care    PULMONARY: CTA chest no PE, no opacities or consolidations. HOB @ 45 degrees. Goal O2 saturation 92-96%. Vent changes as follows: TV 6cc/kg IBW; RR 14; Fio2 40%; PEEP of 5. Repeat ABG after these vent changes. SBT today.    CARDIOVASCULAR: Goal directed fluid resuscitation. Trend trops. Keep MAP more than 65mmhg.     GI: Feeding through OG tube. Bowel regimen. Start GI prophylaxis.     RENAL: Continue LR. Follow up lytes. Correct as needed. Patrick catheter. Repeat BMP PM.    INFECTIOUS DISEASE: Leukocytosis noted. UA negative. CT chest no acute infiltrates. S/P Cefepime and Vancomycin in the ED. De-escalate based on culture results.     HEMATOLOGICAL: DVT prophylaxis: Lovenox    ENDOCRINE: Follow up FS. Insulin protocol if needed. Continue home meds. Check TSH.     MUSCULOSKELETAL: Bed rest    DISPO: Critically ill and needs ICU level of care.    IMPRESSION:    Acute hypercapnic respiratory failure  Acute hypoxemic respiratory failure  THERESA with decreased urine output  H/o Hypothyroidism  Altered mental status   SIRS    PLAN:    CNS: CT head no acute changes. Spontaneous awakening trial daily. Hold sedation. Routine EEG no signs of seizure activity. Now following commands.     HEENT: Oral care; ETT care    PULMONARY: CTA chest no PE, no opacities or consolidations. HOB @ 45 degrees. Goal O2 saturation 92-96%. SBT with PS support trial today.    CARDIOVASCULAR: Lower LR to 50cc/hr. Trops are negative. Keep MAP more than 65mmhg. Wean off Levophed.     GI: Hold OGT for possible extubation. Bowel regimen. Start GI prophylaxis.     RENAL: Continue LR at 50cc/hr. Follow up lytes. Correct as needed. Patrick catheter. Repeat BMP PM.    INFECTIOUS DISEASE: Leukocytosis resolved. UA negative. CT chest no acute infiltrates. S/P Cefepime and Vancomycin in the ED. DC Vancomycin for now. COVID PCR positive. ID evaluation.     HEMATOLOGICAL: DVT prophylaxis: Lovenox SQ.     ENDOCRINE: Follow up FS. Insulin protocol if needed. Continue home meds. Check TSH.     MUSCULOSKELETAL: Bed rest    DISPO: Critically ill and needs ICU level of care.

## 2022-09-08 NOTE — PROGRESS NOTE ADULT - ASSESSMENT
IMPRESSION:    Acute hypercapnic respiratory failure  Acute hypoxemic respiratory failure  THERESA with decreased urine output  H/o Hypothyroidism  Altered mental status   SIRS    PLAN:    CNS: CT head no acute changes. Spontaneous awakening trial. Sedation with predecex infusion; Fentanyl as needed. Send UDS SDS. Routine EEG no signs of seizure activity. Daily SATs. Now following commands. Neuro following.    HEENT: Oral care; ETT care    PULMONARY: CTA chest no PE, no opacities or consolidations. HOB @ 45 degrees. Goal O2 saturation 92-96%. Vent changes as follows: TV 6cc/kg IBW; RR 14; Fio2 40%; PEEP of 5. Repeat ABG after these vent changes. SBT today.    CARDIOVASCULAR: Goal directed fluid resuscitation. Trend trops. Keep MAP more than 65mmhg.     GI: Feeding through OG tube. Bowel regimen. Start GI prophylaxis.     RENAL: Continue LR. Follow up lytes. Correct as needed. Patrick catheter. Repeat BMP PM.    INFECTIOUS DISEASE: Leukocytosis noted. UA negative. CT chest no acute infiltrates. S/P Cefepime and Vancomycin in the ED. De-escalate based on culture results.     HEMATOLOGICAL: DVT prophylaxis: Lovenox    ENDOCRINE: Follow up FS. Insulin protocol if needed. Continue home meds. Check TSH.     MUSCULOSKELETAL: Bed rest    DISPO: Critically ill and needs ICU level of care.

## 2022-09-08 NOTE — PATIENT PROFILE ADULT - FALL HARM RISK - HARM RISK INTERVENTIONS

## 2022-09-08 NOTE — PROGRESS NOTE ADULT - SUBJECTIVE AND OBJECTIVE BOX
Patient is a 89y old Female who presents with a chief complaint of AMS, respiratory failure (07 Sep 2022 17:34)        Over Night Events:  Urine output now 10ccs/hour.      ROS:     All pertinent ROS are negative except HPI         PHYSICAL EXAM    ICU Vital Signs Last 24 Hrs  T(C): 37 (08 Sep 2022 04:00), Max: 37.4 (07 Sep 2022 18:00)  T(F): 98.6 (08 Sep 2022 04:00), Max: 99.3 (07 Sep 2022 18:00)  HR: 57 (08 Sep 2022 06:00) (54 - 130)  BP: 144/67 (08 Sep 2022 06:00) (84/50 - 171/84)  BP(mean): 97 (08 Sep 2022 06:00) (63 - 107)  RR: 16 (08 Sep 2022 06:00) (12 - 20)  SpO2: 100% (08 Sep 2022 08:09) (99% - 100%)    O2 Parameters below as of 08 Sep 2022 07:00  Patient On (Oxygen Delivery Method): ventilator    O2 Concentration (%): 50        CONSTITUTIONAL:   Ill appearing.  Well nourished.  NAD    ENT:   Airway patent,   Mouth with normal mucosa.   No thrush    EYES:   Pupils equal,   Round and reactive to light.    CARDIAC:   Normal rate,   Regular rhythm.    No edema    RESPIRATORY:   No wheezing  Bilateral BS  Normal chest expansion  Not tachypneic,  No use of accessory muscles    GASTROINTESTINAL:  Abdomen soft,   Non-tender,   No guarding,   + BS    MUSCULOSKELETAL:   Range of motion is not limited,  No clubbing, cyanosis    NEUROLOGICAL:   Follows commands when off sedation  No motor  deficits.  pertinent DTRs normal    SKIN:   Skin normal color for race,   Warm and dry  No evidence of rash.    PSYCHIATRIC:   Sedated.        22 @ 07:01  -  22 @ 07:00  --------------------------------------------------------  IN:    Dexmedetomidine: 27.8 mL    FentaNYL: 14.6 mL    IV PiggyBack: 300 mL    Lactated Ringers: 375 mL  Total IN: 717.4 mL    OUT:    Indwelling Catheter - Urethral (mL): 75 mL  Total OUT: 75 mL    Total NET: 642.4 mL          LABS:                            8.8    7.40  )-----------( 180      ( 08 Sep 2022 06:04 )             27.2                                               -    137  |  102  |  27<H>  ----------------------------<  85  3.9   |  22  |  0.7    Ca    7.9<L>      08 Sep 2022 06:04  Mg     1.3         TPro  4.2<L>  /  Alb  2.8<L>  /  TBili  0.4  /  DBili  x   /  AST  13  /  ALT  9   /  AlkPhos  86                                               Urinalysis Basic - ( 07 Sep 2022 13:33 )    Color: Colorless / Appearance: Clear / S.018 / pH: x  Gluc: x / Ketone: Negative  / Bili: Negative / Urobili: <2 mg/dL   Blood: x / Protein: Trace / Nitrite: Negative   Leuk Esterase: Negative / RBC: x / WBC x   Sq Epi: x / Non Sq Epi: x / Bacteria: x        CARDIAC MARKERS ( 08 Sep 2022 06:04 )  x     / 0.03 ng/mL / x     / x     / x      CARDIAC MARKERS ( 07 Sep 2022 21:36 )  x     / 0.03 ng/mL / x     / x     / x      CARDIAC MARKERS ( 07 Sep 2022 11:35 )  x     / 0.04 ng/mL / x     / x     / x                                                LIVER FUNCTIONS - ( 08 Sep 2022 06:04 )  Alb: 2.8 g/dL / Pro: 4.2 g/dL / ALK PHOS: 86 U/L / ALT: 9 U/L / AST: 13 U/L / GGT: x                                                                                               Mode: AC/ CMV (Assist Control/ Continuous Mandatory Ventilation)  RR (machine): 16  TV (machine): 400  FiO2: 50  PEEP: 5  ITime: 1  MAP: 9  PIP: 21                                      ABG - ( 08 Sep 2022 03:36 )  pH, Arterial: 7.47  pH, Blood: x     /  pCO2: 32    /  pO2: 195   / HCO3: 23    / Base Excess: 0.3   /  SaO2: 99.4                MEDICATIONS  (STANDING):  atorvastatin 20 milliGRAM(s) Oral at bedtime  cefepime   IVPB 2000 milliGRAM(s) IV Intermittent every 8 hours  chlorhexidine 0.12% Liquid 15 milliLiter(s) Oral Mucosa two times a day  chlorhexidine 2% Cloths 1 Application(s) Topical <User Schedule>  dexMEDEtomidine Infusion 0.2 MICROgram(s)/kG/Hr (3 mL/Hr) IV Continuous <Continuous>  enoxaparin Injectable 40 milliGRAM(s) SubCutaneous every 24 hours  fentaNYL   Infusion. 0.5 MICROgram(s)/kG/Hr (3 mL/Hr) IV Continuous <Continuous>  lactated ringers. 1000 milliLiter(s) (75 mL/Hr) IV Continuous <Continuous>  levothyroxine 75 MICROGram(s) Oral daily  magnesium sulfate  IVPB 2 Gram(s) IV Intermittent every 2 hours  norepinephrine Infusion 0.05 MICROgram(s)/kG/Min (5.63 mL/Hr) IV Continuous <Continuous>  pantoprazole  Injectable 40 milliGRAM(s) IV Push daily  vancomycin  IVPB 1000 milliGRAM(s) IV Intermittent every 12 hours    MEDICATIONS  (PRN):  acetaminophen     Tablet .. 650 milliGRAM(s) Oral every 6 hours PRN Temp greater or equal to 38C (100.4F), Mild Pain (1 - 3)      New X-rays reviewed:                                                                                  ECHO    CXR interpreted by me:

## 2022-09-08 NOTE — PHARMACOTHERAPY INTERVENTION NOTE - COMMENTS
Recommended to decrease cefepime dose from 2g q8h to 2g q12h due to CrCl of 42.  Recommended to decrease cefepime dose from 2g q8h to 2g q12h due to CrCl of 36 (using actual body weight 48.7 kg and Scr=0.8).

## 2022-09-08 NOTE — PROGRESS NOTE ADULT - SUBJECTIVE AND OBJECTIVE BOX
Patient is a 89y old  Female who presents with a chief complaint of AMS, respiratory failure (08 Sep 2022 08:25)    HPI:  89yo F w/ pmhx of HTN, HLD, hypothyroidism, recent left femur fracture s/p surgery 1mo in [facility] presented to ED from [facility] with hypoxia and hypotension. Patient was saturating 50% on room air and improved only minimally on NRB in the ED. History was not able to be obtained by the patient as she was unresponsive and she was intubated for airway protection. Collateral HPI obtained by daughter Becky who said she was sent to the NH for rehab for her hip fracture. She denies any history of dementia. Baseline mental status is AAOx3 and prior to hip fracture patient was living by herself.     Of note patient was being treated in NH for R sided pneumonia (CXR : R perihilar infiltrate) and was receiving levaquin.     ED course:  vitals: /84, HR 73, hypothermic T 93s, O2 100% on BVM  labs: WBC 14.96, trop 0.04, , ABG pH 7.09, PCO2 93, PO2 51  imaging:   - CXR: wet read: wnl   - CT angio chest PE: no PE, lungs wnl   - CT head: No acute intracranial pathology. No evidence of midline shift, mass effect or intracranial hemorrhage.  given: vanc, cefepime, LR 1800ml  (07 Sep 2022 15:40)       INTERVAL HPI/OVERNIGHT EVENTS:   No overnight events   Afebrile, hemodynamically stable     Subjective:    ICU Vital Signs Last 24 Hrs  T(C): 37 (08 Sep 2022 04:00), Max: 37.4 (07 Sep 2022 18:00)  T(F): 98.6 (08 Sep 2022 04:00), Max: 99.3 (07 Sep 2022 18:00)  HR: 82 (08 Sep 2022 08:00) (54 - 130)  BP: 144/67 (08 Sep 2022 06:00) (84/50 - 171/84)  BP(mean): 97 (08 Sep 2022 06:00) (63 - 107)  ABP: --  ABP(mean): --  RR: 25 (08 Sep 2022 08:00) (12 - 25)  SpO2: 100% (08 Sep 2022 08:09) (99% - 100%)    O2 Parameters below as of 08 Sep 2022 07:00  Patient On (Oxygen Delivery Method): ventilator    O2 Concentration (%): 50      I&O's Summary    07 Sep 2022 07:01  -  08 Sep 2022 07:00  --------------------------------------------------------  IN: 717.4 mL / OUT: 75 mL / NET: 642.4 mL    08 Sep 2022 07:01  -  08 Sep 2022 09:02  --------------------------------------------------------  IN: 302.4 mL / OUT: 15 mL / NET: 287.4 mL      Mode: AC/ CMV (Assist Control/ Continuous Mandatory Ventilation)  RR (machine): 14  TV (machine): 400  FiO2: 40  PEEP: 5  ITime: 1  MAP: 9  PIP: 21      Daily Height in cm: 152.4 (08 Sep 2022 05:00)    Daily       EKG/Telemetry Events: N/A    MEDICATIONS  (STANDING):  atorvastatin 20 milliGRAM(s) Oral at bedtime  cefepime   IVPB 2000 milliGRAM(s) IV Intermittent every 8 hours  chlorhexidine 0.12% Liquid 15 milliLiter(s) Oral Mucosa two times a day  chlorhexidine 2% Cloths 1 Application(s) Topical <User Schedule>  dexMEDEtomidine Infusion 0.2 MICROgram(s)/kG/Hr (3 mL/Hr) IV Continuous <Continuous>  enoxaparin Injectable 40 milliGRAM(s) SubCutaneous every 24 hours  fentaNYL   Infusion. 0.5 MICROgram(s)/kG/Hr (3 mL/Hr) IV Continuous <Continuous>  lactated ringers. 1000 milliLiter(s) (75 mL/Hr) IV Continuous <Continuous>  levothyroxine 75 MICROGram(s) Oral daily  magnesium sulfate  IVPB 2 Gram(s) IV Intermittent every 2 hours  norepinephrine Infusion 0.05 MICROgram(s)/kG/Min (5.63 mL/Hr) IV Continuous <Continuous>  pantoprazole  Injectable 40 milliGRAM(s) IV Push daily  vancomycin  IVPB 1000 milliGRAM(s) IV Intermittent every 12 hours    MEDICATIONS  (PRN):  acetaminophen     Tablet .. 650 milliGRAM(s) Oral every 6 hours PRN Temp greater or equal to 38C (100.4F), Mild Pain (1 - 3)      PHYSICAL EXAM:  GENERAL: NAD, sedated, intubated  HEAD:  Atraumatic, Normocephalic  EYES: EOMI, PERRLA, conjunctiva and sclera clear  NECK: Supple, No JVD, Normal thyroid, no enlarged nodes  NERVOUS SYSTEM:  Sedated, follows commands  CHEST/LUNG: B/L good air entry; No rales, rhonchi, or wheezing  HEART: S1S2 normal, no S3, Regular rate and rhythm; No murmurs  ABDOMEN: Soft, Nontender, Nondistended; Bowel sounds present  EXTREMITIES:  2+ Peripheral Pulses, No clubbing, cyanosis, or edema  LYMPH: No lymphadenopathy noted  SKIN: No rashes or lesions    LABS:                        8.8    7.40  )-----------( 180      ( 08 Sep 2022 06:04 )             27.2     09-08    137  |  102  |  27<H>  ----------------------------<  85  3.9   |  22  |  0.7    Ca    7.9<L>      08 Sep 2022 06:04  Mg     1.3         TPro  4.2<L>  /  Alb  2.8<L>  /  TBili  0.4  /  DBili  x   /  AST  13  /  ALT  9   /  AlkPhos  86  -08    LIVER FUNCTIONS - ( 08 Sep 2022 06:04 )  Alb: 2.8 g/dL / Pro: 4.2 g/dL / ALK PHOS: 86 U/L / ALT: 9 U/L / AST: 13 U/L / GGT: x             CAPILLARY BLOOD GLUCOSE      POCT Blood Glucose.: 113 mg/dL (08 Sep 2022 06:30)  POCT Blood Glucose.: 90 mg/dL (08 Sep 2022 01:05)  POCT Blood Glucose.: 167 mg/dL (07 Sep 2022 17:07)  POCT Blood Glucose.: 223 mg/dL (07 Sep 2022 11:21)    ABG - ( 08 Sep 2022 03:36 )  pH, Arterial: 7.47  pH, Blood: x     /  pCO2: 32    /  pO2: 195   / HCO3: 23    / Base Excess: 0.3   /  SaO2: 99.4              Troponin T, Serum: 0.03 ng/mL ( @ 06:04)  Troponin T, Serum: 0.03 ng/mL ( @ 21:36)  Troponin T, Serum: 0.04 ng/mL ( @ 11:35)    CARDIAC MARKERS ( 08 Sep 2022 06:04 )  x     / 0.03 ng/mL / x     / x     / x      CARDIAC MARKERS ( 07 Sep 2022 21:36 )  x     / 0.03 ng/mL / x     / x     / x      CARDIAC MARKERS ( 07 Sep 2022 11:35 )  x     / 0.04 ng/mL / x     / x     / x          Urinalysis Basic - ( 07 Sep 2022 13:33 )    Color: Colorless / Appearance: Clear / S.018 / pH: x  Gluc: x / Ketone: Negative  / Bili: Negative / Urobili: <2 mg/dL   Blood: x / Protein: Trace / Nitrite: Negative   Leuk Esterase: Negative / RBC: x / WBC x   Sq Epi: x / Non Sq Epi: x / Bacteria: x          RADIOLOGY & ADDITIONAL TESTS:  CTA (): 1.  No evidence of a pulmonary embolism.  2.  Endotracheal tube terminates at the origin of the right mainstem   bronchus. Consider 2 to 3 cm retraction. Enteric tube is in satisfactory   position.    CTH: No acute intracranial pathology. No evidence of midline shift, mass   effect or intracranial hemorrhage. Mild chronic microvascular type changes.          Care Discussed with Consultants/Other Providers [ x] YES  [ ] NO           Patient is a 89y old  Female who presents with a chief complaint of AMS, respiratory failure (08 Sep 2022 08:25)    HPI:  88yo F w/ pmhx of HTN, HLD, hypothyroidism, recent left femur fracture s/p surgery 1mo in [facility] presented to ED from [facility] with hypoxia and hypotension. Patient was saturating 50% on room air and improved only minimally on NRB in the ED. History was not able to be obtained by the patient as she was unresponsive and she was intubated for airway protection. Collateral HPI obtained by daughter Becky who said she was sent to the NH for rehab for her hip fracture. She denies any history of dementia. Baseline mental status is AAOx3 and prior to hip fracture patient was living by herself.     Of note patient was being treated in NH for R sided pneumonia (CXR : R perihilar infiltrate) and was receiving levaquin.     ED course:  vitals: /84, HR 73, hypothermic T 93s, O2 100% on BVM  labs: WBC 14.96, trop 0.04, , ABG pH 7.09, PCO2 93, PO2 51  imaging:   - CXR: wet read: wnl   - CT angio chest PE: no PE, lungs wnl   - CT head: No acute intracranial pathology. No evidence of midline shift, mass effect or intracranial hemorrhage.  given: vanc, cefepime, LR 1800ml  (07 Sep 2022 15:40)       INTERVAL HPI/OVERNIGHT EVENTS:   Overnight the patient was awake and agitated, sedated with fentanyl and precedex. Patient is also anuric   Afebrile, hemodynamically stable     Subjective:    ICU Vital Signs Last 24 Hrs  T(C): 37 (08 Sep 2022 04:00), Max: 37.4 (07 Sep 2022 18:00)  T(F): 98.6 (08 Sep 2022 04:00), Max: 99.3 (07 Sep 2022 18:00)  HR: 82 (08 Sep 2022 08:00) (54 - 130)  BP: 144/67 (08 Sep 2022 06:00) (84/50 - 171/84)  BP(mean): 97 (08 Sep 2022 06:00) (63 - 107)  ABP: --  ABP(mean): --  RR: 25 (08 Sep 2022 08:00) (12 - 25)  SpO2: 100% (08 Sep 2022 08:09) (99% - 100%)    O2 Parameters below as of 08 Sep 2022 07:00  Patient On (Oxygen Delivery Method): ventilator    O2 Concentration (%): 50      I&O's Summary    07 Sep 2022 07:01  -  08 Sep 2022 07:00  --------------------------------------------------------  IN: 717.4 mL / OUT: 75 mL / NET: 642.4 mL    08 Sep 2022 07:01  -  08 Sep 2022 09:02  --------------------------------------------------------  IN: 302.4 mL / OUT: 15 mL / NET: 287.4 mL      Mode: AC/ CMV (Assist Control/ Continuous Mandatory Ventilation)  RR (machine): 14  TV (machine): 400  FiO2: 40  PEEP: 5  ITime: 1  MAP: 9  PIP: 21      Daily Height in cm: 152.4 (08 Sep 2022 05:00)    Daily       EKG/Telemetry Events: N/A    MEDICATIONS  (STANDING):  atorvastatin 20 milliGRAM(s) Oral at bedtime  cefepime   IVPB 2000 milliGRAM(s) IV Intermittent every 8 hours  chlorhexidine 0.12% Liquid 15 milliLiter(s) Oral Mucosa two times a day  chlorhexidine 2% Cloths 1 Application(s) Topical <User Schedule>  dexMEDEtomidine Infusion 0.2 MICROgram(s)/kG/Hr (3 mL/Hr) IV Continuous <Continuous>  enoxaparin Injectable 40 milliGRAM(s) SubCutaneous every 24 hours  fentaNYL   Infusion. 0.5 MICROgram(s)/kG/Hr (3 mL/Hr) IV Continuous <Continuous>  lactated ringers. 1000 milliLiter(s) (75 mL/Hr) IV Continuous <Continuous>  levothyroxine 75 MICROGram(s) Oral daily  magnesium sulfate  IVPB 2 Gram(s) IV Intermittent every 2 hours  norepinephrine Infusion 0.05 MICROgram(s)/kG/Min (5.63 mL/Hr) IV Continuous <Continuous>  pantoprazole  Injectable 40 milliGRAM(s) IV Push daily  vancomycin  IVPB 1000 milliGRAM(s) IV Intermittent every 12 hours    MEDICATIONS  (PRN):  acetaminophen     Tablet .. 650 milliGRAM(s) Oral every 6 hours PRN Temp greater or equal to 38C (100.4F), Mild Pain (1 - 3)      PHYSICAL EXAM:  GENERAL: NAD, sedated, intubated  HEAD:  Atraumatic, Normocephalic  EYES: EOMI, PERRLA, conjunctiva and sclera clear  NECK: Supple, No JVD, Normal thyroid, no enlarged nodes  NERVOUS SYSTEM:  Sedated, follows commands  CHEST/LUNG: B/L good air entry; No rales, rhonchi, or wheezing  HEART: S1S2 normal, no S3, Regular rate and rhythm; No murmurs  ABDOMEN: Soft, Nontender, Nondistended; Bowel sounds present  EXTREMITIES:  2+ Peripheral Pulses, No clubbing, cyanosis, or edema  LYMPH: No lymphadenopathy noted  SKIN: No rashes or lesions    LABS:                        8.8    7.40  )-----------( 180      ( 08 Sep 2022 06:04 )             27.2     09-08    137  |  102  |  27<H>  ----------------------------<  85  3.9   |  22  |  0.7    Ca    7.9<L>      08 Sep 2022 06:04  Mg     1.3     09-08    TPro  4.2<L>  /  Alb  2.8<L>  /  TBili  0.4  /  DBili  x   /  AST  13  /  ALT  9   /  AlkPhos  86  09-08    LIVER FUNCTIONS - ( 08 Sep 2022 06:04 )  Alb: 2.8 g/dL / Pro: 4.2 g/dL / ALK PHOS: 86 U/L / ALT: 9 U/L / AST: 13 U/L / GGT: x             CAPILLARY BLOOD GLUCOSE      POCT Blood Glucose.: 113 mg/dL (08 Sep 2022 06:30)  POCT Blood Glucose.: 90 mg/dL (08 Sep 2022 01:05)  POCT Blood Glucose.: 167 mg/dL (07 Sep 2022 17:07)  POCT Blood Glucose.: 223 mg/dL (07 Sep 2022 11:21)    ABG - ( 08 Sep 2022 03:36 )  pH, Arterial: 7.47  pH, Blood: x     /  pCO2: 32    /  pO2: 195   / HCO3: 23    / Base Excess: 0.3   /  SaO2: 99.4              Troponin T, Serum: 0.03 ng/mL ( @ 06:04)  Troponin T, Serum: 0.03 ng/mL ( @ 21:36)  Troponin T, Serum: 0.04 ng/mL ( @ 11:35)    CARDIAC MARKERS ( 08 Sep 2022 06:04 )  x     / 0.03 ng/mL / x     / x     / x      CARDIAC MARKERS ( 07 Sep 2022 21:36 )  x     / 0.03 ng/mL / x     / x     / x      CARDIAC MARKERS ( 07 Sep 2022 11:35 )  x     / 0.04 ng/mL / x     / x     / x          Urinalysis Basic - ( 07 Sep 2022 13:33 )    Color: Colorless / Appearance: Clear / S.018 / pH: x  Gluc: x / Ketone: Negative  / Bili: Negative / Urobili: <2 mg/dL   Blood: x / Protein: Trace / Nitrite: Negative   Leuk Esterase: Negative / RBC: x / WBC x   Sq Epi: x / Non Sq Epi: x / Bacteria: x          RADIOLOGY & ADDITIONAL TESTS:  CTA (): 1.  No evidence of a pulmonary embolism.  2.  Endotracheal tube terminates at the origin of the right mainstem   bronchus. Consider 2 to 3 cm retraction. Enteric tube is in satisfactory   position.    CTH: No acute intracranial pathology. No evidence of midline shift, mass   effect or intracranial hemorrhage. Mild chronic microvascular type changes.          Care Discussed with Consultants/Other Providers [ x] YES  [ ] NO

## 2022-09-08 NOTE — PHARMACOTHERAPY INTERVENTION NOTE - COMMENTS
Recommended ID consult since patient currently receiving vancomycin and cefepime for sepsis of unknown cause. D/W Dr. Ewing - will discuss with team on rounds

## 2022-09-08 NOTE — PHARMACOTHERAPY INTERVENTION NOTE - NSPHARMCOMMASP
ASP - Lab/ test recommended
ASP - Lab/ test recommended
ASP - Recommend ID Consult
ASP - Renal dose adjustment

## 2022-09-08 NOTE — PHARMACOTHERAPY INTERVENTION NOTE - COMMENTS
As per policy, ordered a vancomycin trough for 5 AM on 9/10 (prior to third dose) since patient has been started on a new regimen of vancomycin (750 mg IV q12h).

## 2022-09-09 NOTE — PROGRESS NOTE ADULT - SUBJECTIVE AND OBJECTIVE BOX
Patient is a 89y old  Female who presents with a chief complaint of AMS, respiratory failure (09 Sep 2022 08:22)    HPI:  88yo F w/ pmhx of HTN, HLD, hypothyroidism, recent left femur fracture s/p surgery 1mo in [facility] presented to ED from [facility] with hypoxia and hypotension. Patient was saturating 50% on room air and improved only minimally on NRB in the ED. History was not able to be obtained by the patient as she was unresponsive and she was intubated for airway protection. Collateral HPI obtained by daughter Becky who said she was sent to the NH for rehab for her hip fracture. She denies any history of dementia. Baseline mental status is AAOx3 and prior to hip fracture patient was living by herself.     Of note patient was being treated in NH for R sided pneumonia (CXR : R perihilar infiltrate) and was receiving levaquin.     ED course:  vitals: /84, HR 73, hypothermic T 93s, O2 100% on BVM  labs: WBC 14.96, trop 0.04, , ABG pH 7.09, PCO2 93, PO2 51  imaging:   - CXR: wet read: wnl   - CT angio chest PE: no PE, lungs wnl   - CT head: No acute intracranial pathology. No evidence of midline shift, mass effect or intracranial hemorrhage.  given: vanc, cefepime, LR 1800ml  (07 Sep 2022 15:40)       INTERVAL HPI/OVERNIGHT EVENTS:   Patient started on precedex and then switched to propofol for agitation, switched back to precedex for low BP.   Afebrile, BP drops to 60's when patient sleeps but and then when recycled BP normalizes    Subjective:    ICU Vital Signs Last 24 Hrs  T(C): 36.6 (09 Sep 2022 07:45), Max: 36.6 (08 Sep 2022 20:00)  T(F): 97.9 (09 Sep 2022 07:45), Max: 97.9 (08 Sep 2022 20:00)  HR: 59 (09 Sep 2022 07:45) (46 - 128)  BP: 140/65 (09 Sep 2022 07:45) (64/40 - 192/74)  BP(mean): 94 (09 Sep 2022 07:45) (48 - 120)  ABP: --  ABP(mean): --  RR: 32 (09 Sep 2022 07:45) (14 - 39)  SpO2: 100% (09 Sep 2022 07:45) (98% - 100%)    O2 Parameters below as of 09 Sep 2022 07:45  Patient On (Oxygen Delivery Method): ventilator    O2 Concentration (%): 40      I&O's Summary    08 Sep 2022 07:01  -  09 Sep 2022 07:00  --------------------------------------------------------  IN: 3428.2 mL / OUT: 435 mL / NET: 2993.2 mL    09 Sep 2022 07:01  -  09 Sep 2022 09:11  --------------------------------------------------------  IN: 78.7 mL / OUT: 30 mL / NET: 48.7 mL      Mode: AC/ CMV (Assist Control/ Continuous Mandatory Ventilation)  RR (machine): 14  TV (machine): 400  FiO2: 40  PEEP: 5  ITime: 1  MAP: 8  PIP: 20        EKG/Telemetry Events: Bradycardia    MEDICATIONS  (STANDING):  atorvastatin 20 milliGRAM(s) Oral at bedtime  cefTRIAXone   IVPB 1000 milliGRAM(s) IV Intermittent every 24 hours  chlorhexidine 0.12% Liquid 15 milliLiter(s) Oral Mucosa two times a day  chlorhexidine 2% Cloths 1 Application(s) Topical <User Schedule>  dexMEDEtomidine Infusion 0.2 MICROgram(s)/kG/Hr (3 mL/Hr) IV Continuous <Continuous>  doxycycline IVPB 100 milliGRAM(s) IV Intermittent every 12 hours  enoxaparin Injectable 40 milliGRAM(s) SubCutaneous every 24 hours  fentaNYL   Infusion. 0.5 MICROgram(s)/kG/Hr (3 mL/Hr) IV Continuous <Continuous>  furosemide   Injectable 40 milliGRAM(s) IV Push once  levothyroxine 75 MICROGram(s) Oral daily  mupirocin 2% Ointment 1 Application(s) Topical two times a day  norepinephrine Infusion 0.05 MICROgram(s)/kG/Min (5.63 mL/Hr) IV Continuous <Continuous>  pantoprazole  Injectable 40 milliGRAM(s) IV Push daily  propofol Infusion 5 MICROgram(s)/kG/Min (1.46 mL/Hr) IV Continuous <Continuous>    MEDICATIONS  (PRN):  acetaminophen     Tablet .. 650 milliGRAM(s) Oral every 6 hours PRN Temp greater or equal to 38C (100.4F), Mild Pain (1 - 3)      PHYSICAL EXAM:  GENERAL: NAD, intubated  HEAD:  Atraumatic, Normocephalic  EYES: EOMI, PERRLA, conjunctiva and sclera clear  NECK: Supple, No JVD, Normal thyroid, no enlarged nodes  NERVOUS SYSTEM:  Follows commands  CHEST/LUNG: B/L good air entry; No rales, rhonchi, or wheezing  HEART: S1S2 normal, no S3, Regular rate and rhythm; No murmurs  ABDOMEN: Soft, Nontender, Nondistended; Bowel sounds present  EXTREMITIES:  2+ Peripheral Pulses, No clubbing, cyanosis, or edema  LYMPH: No lymphadenopathy noted  SKIN: No rashes or lesions    LABS:                        10.5   10.22 )-----------( 221      ( 09 Sep 2022 04:53 )             32.7         138  |  104  |  36<H>  ----------------------------<  162<H>  4.6   |  27  |  1.0    Ca    8.3<L>      09 Sep 2022 04:53  Mg     2.7         TPro  5.0<L>  /  Alb  3.1<L>  /  TBili  0.3  /  DBili  x   /  AST  14  /  ALT  10  /  AlkPhos  106      LIVER FUNCTIONS - ( 09 Sep 2022 04:53 )  Alb: 3.1 g/dL / Pro: 5.0 g/dL / ALK PHOS: 106 U/L / ALT: 10 U/L / AST: 14 U/L / GGT: x             CAPILLARY BLOOD GLUCOSE      POCT Blood Glucose.: 98 mg/dL (08 Sep 2022 13:01)    ABG - ( 09 Sep 2022 03:47 )  pH, Arterial: 7.41  pH, Blood: x     /  pCO2: 38    /  pO2: 172   / HCO3: 24    / Base Excess: -0.3  /  SaO2: 100.0               CARDIAC MARKERS ( 08 Sep 2022 06:04 )  x     / 0.03 ng/mL / x     / x     / x      CARDIAC MARKERS ( 07 Sep 2022 21:36 )  x     / 0.03 ng/mL / x     / x     / x      CARDIAC MARKERS ( 07 Sep 2022 11:35 )  x     / 0.04 ng/mL / x     / x     / x          Urinalysis Basic - ( 07 Sep 2022 13:33 )    Color: Colorless / Appearance: Clear / S.018 / pH: x  Gluc: x / Ketone: Negative  / Bili: Negative / Urobili: <2 mg/dL   Blood: x / Protein: Trace / Nitrite: Negative   Leuk Esterase: Negative / RBC: x / WBC x   Sq Epi: x / Non Sq Epi: x / Bacteria: x          RADIOLOGY & ADDITIONAL TESTS:  CXR: () No radiographic evidence of acute cardiopulmonary disease.        Care Discussed with Consultants/Other Providers [ x] YES  [ ] NO

## 2022-09-09 NOTE — CONSULT NOTE ADULT - SUBJECTIVE AND OBJECTIVE BOX
NUTRITION SUPPORT TEAM  -  CONSULT NOTE     ADMISSION HPI:  88yo F w/ pmhx of HTN, HLD, hypothyroidism, recent left femur fracture s/p surgery 1mo in [facility] presented to ED from [facility] with hypoxia and hypotension. Patient was saturating 50% on room air and improved only minimally on NRB in the ED. History was not able to be obtained by the patient as she was unresponsive and she was intubated for airway protection. Collateral HPI obtained by daughter Becky who said she was sent to the NH for rehab for her hip fracture. She denies any history of dementia. Baseline mental status is AAOx3 and prior to hip fracture patient was living by herself.     Of note patient was being treated in NH for R sided pneumonia (CXR 9/6: R perihilar infiltrate) and was receiving levaquin.     ED course:  vitals: /84, HR 73, hypothermic T 93s, O2 100% on BVM  labs: WBC 14.96, trop 0.04, , ABG pH 7.09, PCO2 93, PO2 51  imaging:   - CXR: wet read: wnl   - CT angio chest PE: no PE, lungs wnl   - CT head: No acute intracranial pathology. No evidence of midline shift, mass effect or intracranial hemorrhage.  given: vanc, cefepime, LR 1800ml  (07 Sep 2022 15:40)    NUTRITION SUPPORT NOTE:    Nutrition support team consulted for tube feeding recommendations. As per the nurse, tube feeds were held this AM for SBT. Patient did not pass SBT and team plans to keep intubated. No diarrhea/vomiting/abdominal distension at this time.     REVIEW OF SYSTEMS:  Negative except as noted above.     PAST MEDICAL/SURGICAL HISTORY:   HTN (hypertension)  High cholesterol  Hypothyroid  History of colon resection  H/O: hysterectomy    ALLERGIES:  penicillin (Unknown)    VITALS:  T(F): 97.4 (09-09 @ 12:00), Max: 97.9 (09-09 @ 04:00)  HR: 77 (09-09 @ 15:00) (59 - 110)  BP: 114/56 (09-09 @ 14:00) (64/40 - 192/74)  RR: 14 (09-09 @ 15:00) (14 - 39)  SpO2: 100% (09-09 @ 15:00) (98% - 100%)    HEIGHT/WEIGHT/BMI:   Height (cm): 152.4 (09-08)  Weight (kg): 48.7 (09-08)  BMI (kg/m2): 21 (09-08)    PHYSICAL EXAM:   GENERAL: NAD, intubated and sedated, follows commands   HEENT: Moist mucous membranes, Good dentition, No lesions, OG tube in place   ABDOMEN: Soft, Nontender, Nondistended  EXTREMITIES:  No clubbing, cyanosis, or edema  SKIN: warm and well perfused; No obvious rashes or lesions  ENTERAL ACCESS: OG tube     I/Os:   09-08-22 @ 07:01  -  09-09-22 @ 07:00  --------------------------------------------------------  IN:    Dexmedetomidine: 4 mL    Dexmedetomidine: 61.2 mL    FentaNYL: 3 mL    IV PiggyBack: 350 mL    Lactated Ringers: 1800 mL    Peptamen A.F.: 960 mL    Sodium Chloride 0.9% Bolus: 250 mL  Total IN: 3428.2 mL    OUT:    Indwelling Catheter - Urethral (mL): 435 mL  Total OUT: 435 mL    Total NET: 2993.2 mL    STANDING MEDICATIONS:   atorvastatin 20 milliGRAM(s) Oral at bedtime  cefTRIAXone   IVPB 1000 milliGRAM(s) IV Intermittent every 24 hours  chlorhexidine 0.12% Liquid 15 milliLiter(s) Oral Mucosa two times a day  chlorhexidine 2% Cloths 1 Application(s) Topical <User Schedule>  dexMEDEtomidine Infusion 0.2 MICROgram(s)/kG/Hr IV Continuous <Continuous>  doxycycline IVPB 100 milliGRAM(s) IV Intermittent every 12 hours  enoxaparin Injectable 40 milliGRAM(s) SubCutaneous every 24 hours  fentaNYL   Infusion. 0.5 MICROgram(s)/kG/Hr IV Continuous <Continuous>  levothyroxine 75 MICROGram(s) Oral daily  mupirocin 2% Ointment 1 Application(s) Topical two times a day  norepinephrine Infusion 0.05 MICROgram(s)/kG/Min IV Continuous <Continuous>  pantoprazole  Injectable 40 milliGRAM(s) IV Push daily  propofol Infusion 5 MICROgram(s)/kG/Min IV Continuous <Continuous>    LABS:                         10.5   10.22 )-----------( 221      ( 09 Sep 2022 04:53 )             32.7     138  |  104  |  36<H>  ----------------------------<  162<H>          (09-09-22 @ 04:53)  4.6   |  27  |  1.0    Ca    8.3<L>          (09-09-22 @ 04:53)  Mg     2.7         (09-09-22 @ 04:53)    TPro  5.0<L>  /  Alb  3.1<L>  /  TBili  0.3  /  DBili  x   /  AST  14  /  ALT  10  /  AlkPhos  106       09-09-22 @ 04:53    Blood Glucose (Past 24 hours):  121 mg/dL (09-09 @ 12:05)    DIET:   Diet, NPO with Tube Feed:   Tube Feeding Modality: Orogastric  Peptamen A.F. Formula  Total Volume for 24 Hours (mL): 1440  Bolus  Total Volume of Bolus (mL):  360  Tube Feed Frequency: Every 6 hours   Tube Feed Start Time: 00:00  Bolus Feed Rate (mL per Hour): 360   Bolus Feed Duration (in Hours): 24 (09-08-22 @ 23:55) [Active]      RADIOLOGY:   Xray Chest 1 View- PORTABLE-Routine (Xray Chest 1 View- PORTABLE-Routine in AM.) (09.09.22 @ 03:31) >  Impression:  No focal consolidation, pleural effusion, or pneumothorax.  Lines and support hardware in satisfactory position as described. NUTRITION SUPPORT TEAM  -  CONSULT NOTE     ADMISSION HPI:  88yo F w/ pmhx of HTN, HLD, hypothyroidism, recent left femur fracture s/p surgery 1mo in [facility] presented to ED from [facility] with hypoxia and hypotension. Patient was saturating 50% on room air and improved only minimally on NRB in the ED. History was not able to be obtained by the patient as she was unresponsive and she was intubated for airway protection. Collateral HPI obtained by daughter Becky who said she was sent to the NH for rehab for her hip fracture. She denies any history of dementia. Baseline mental status is AAOx3 and prior to hip fracture patient was living by herself.   Of note patient was being treated in NH for R sided pneumonia (CXR 9/6: R perihilar infiltrate) and was receiving levaquin.     ED course:  vitals: /84, HR 73, hypothermic T 93s, O2 100% on BVM  labs: WBC 14.96, trop 0.04, , ABG pH 7.09, PCO2 93, PO2 51  imaging:   - CXR: wet read: wnl   - CT angio chest PE: no PE, lungs wnl   - CT head: No acute intracranial pathology. No evidence of midline shift, mass effect or intracranial hemorrhage.  given: vanc, cefepime, LR 1800ml  (07 Sep 2022 15:40)    NUTRITION SUPPORT NOTE:    Nutrition support team consulted for tube feeding recommendations. As per the nurse, tube feeds were held this AM for SBT. Patient did not pass SBT and team plans to keep intubated. No diarrhea/vomiting/abdominal distension at this time.     REVIEW OF SYSTEMS:  Negative except as noted above.     PAST MEDICAL/SURGICAL HISTORY:   HTN (hypertension)  High cholesterol  Hypothyroid  History of colon resection  H/O: hysterectomy    ALLERGIES:  penicillin (Unknown)    VITALS:  T(F): 97.4 (09-09 @ 12:00), Max: 97.9 (09-09 @ 04:00)  HR: 77 (09-09 @ 15:00) (59 - 110)  BP: 114/56 (09-09 @ 14:00) (64/40 - 192/74)  RR: 14 (09-09 @ 15:00) (14 - 39)  SpO2: 100% (09-09 @ 15:00) (98% - 100%)    HEIGHT/WEIGHT/BMI:   Height (cm): 152.4 (09-08)  Weight (kg): 48.7 (09-08)  BMI (kg/m2): 21 (09-08)    PHYSICAL EXAM:   GENERAL: NAD, intubated and sedated, follows commands   HEENT: Moist mucous membranes, Good dentition, No lesions, OG tube in place   ABDOMEN: Soft, Nontender, Nondistended  EXTREMITIES:  No clubbing, cyanosis, or edema  SKIN: warm and well perfused; No obvious rashes or lesions  ENTERAL ACCESS: OG tube     I/Os:   09-08-22 @ 07:01  -  09-09-22 @ 07:00  --------------------------------------------------------  IN:    Dexmedetomidine: 4 mL    Dexmedetomidine: 61.2 mL    FentaNYL: 3 mL    IV PiggyBack: 350 mL    Lactated Ringers: 1800 mL    Peptamen A.F.: 960 mL    Sodium Chloride 0.9% Bolus: 250 mL  Total IN: 3428.2 mL    OUT:    Indwelling Catheter - Urethral (mL): 435 mL  Total OUT: 435 mL    Total NET: 2993.2 mL    STANDING MEDICATIONS:   atorvastatin 20 milliGRAM(s) Oral at bedtime  cefTRIAXone   IVPB 1000 milliGRAM(s) IV Intermittent every 24 hours  chlorhexidine 0.12% Liquid 15 milliLiter(s) Oral Mucosa two times a day  chlorhexidine 2% Cloths 1 Application(s) Topical <User Schedule>  dexMEDEtomidine Infusion 0.2 MICROgram(s)/kG/Hr IV Continuous <Continuous>  doxycycline IVPB 100 milliGRAM(s) IV Intermittent every 12 hours  enoxaparin Injectable 40 milliGRAM(s) SubCutaneous every 24 hours  fentaNYL   Infusion. 0.5 MICROgram(s)/kG/Hr IV Continuous <Continuous>  levothyroxine 75 MICROGram(s) Oral daily  mupirocin 2% Ointment 1 Application(s) Topical two times a day  norepinephrine Infusion 0.05 MICROgram(s)/kG/Min IV Continuous <Continuous>  pantoprazole  Injectable 40 milliGRAM(s) IV Push daily  propofol Infusion 5 MICROgram(s)/kG/Min IV Continuous <Continuous>    LABS:                         10.5   10.22 )-----------( 221      ( 09 Sep 2022 04:53 )             32.7     138  |  104  |  36<H>  ----------------------------<  162<H>          (09-09-22 @ 04:53)  4.6   |  27  |  1.0    Ca    8.3<L>          (09-09-22 @ 04:53)  Mg     2.7         (09-09-22 @ 04:53)    TPro  5.0<L>  /  Alb  3.1<L>  /  TBili  0.3  /  DBili  x   /  AST  14  /  ALT  10  /  AlkPhos  106       09-09-22 @ 04:53    Blood Glucose (Past 24 hours):  121 mg/dL (09-09 @ 12:05)    DIET:   Diet, NPO with Tube Feed:   Tube Feeding Modality: Orogastric  Peptamen A.F. Formula  Total Volume for 24 Hours (mL): 1440  Bolus  Total Volume of Bolus (mL):  360  Tube Feed Frequency: Every 6 hours   Tube Feed Start Time: 00:00  Bolus Feed Rate (mL per Hour): 360   Bolus Feed Duration (in Hours): 24 (09-08-22 @ 23:55) [Active]      RADIOLOGY:   Xray Chest 1 View- PORTABLE-Routine (Xray Chest 1 View- PORTABLE-Routine in AM.) (09.09.22 @ 03:31) >  Impression:  No focal consolidation, pleural effusion, or pneumothorax.  Lines and support hardware in satisfactory position as described.

## 2022-09-09 NOTE — PROGRESS NOTE ADULT - ASSESSMENT
IMPRESSION:    Acute hypercapnic respiratory failure  Acute hypoxemic respiratory failure  THERESA with decreased urine output  H/o Hypothyroidism  Altered mental status   SIRS    PLAN:    CNS: CT head no acute changes. Spontaneous awakening trial. Hold sedation. Routine EEG no signs of seizure activity. Now following commands.     HEENT: Oral care; ETT care    PULMONARY: CTA chest no PE, no opacities or consolidations. HOB @ 45 degrees. SBT when awake.     CARDIOVASCULAR: DC IVF. Trops are negative. Keep MAP more than 65mmhg. Lasix 40mg IV once.     GI: Hold OGT feeds for possible extubation. Bowel regimen. GI prophylaxis.     RENAL: Follow up lytes. Correct as needed. Patrick catheter.     INFECTIOUS DISEASE: Leukocytosis resolved. UA negative. Cultures negative CT chest no acute infiltrates. COVID PCR positive, Rocephin and Doxy for  total of 5 days.     HEMATOLOGICAL: DVT prophylaxis: Lovenox SQ.     ENDOCRINE: Follow up FS. Insulin protocol if needed. Continue home meds. Check TSH.     MUSCULOSKELETAL: Bed rest    DISPO: Critically ill and needs ICU level of care.

## 2022-09-09 NOTE — CONSULT NOTE ADULT - ASSESSMENT
88yo F w/ PMHx of HTN, HLD, hypothyroidism, recent left femur fracture s/p surgery 1 month PTA in [facility], presented to ED from [facility] with hypoxia and hypotension. Intubated 9/7 and failed SBT on 9/9.    - continue Peptamen AF, but decrease to 360 mL 3x per day (provides 27 kcal/kg/day)   - order phos level for AM and replete PRN  - continue to monitor bowel function   - will continue to follow     88yo F w/ PMHx of HTN, HLD, hypothyroidism, recent left femur fracture s/p surgery 1 month PTA in [facility], presented to ED from [facility] with hypoxia and hypotension. Intubated 9/7 and failed SBT on 9/9.    - continue Peptamen AF, but decrease to 360 mL 3x per day (provides 27 kcal/kg/day)   - order phos level for AM ( ordered) and replete PRN  - continue to monitor bowel function   - will continue to follow

## 2022-09-09 NOTE — PROGRESS NOTE ADULT - SUBJECTIVE AND OBJECTIVE BOX
Patient is a 89y old  Female who presents with a chief complaint of AMS, respiratory failure (08 Sep 2022 09:01)        Over Night Events:    No events   Remains on sedation         ROS:  See HPI    PHYSICAL EXAM    ICU Vital Signs Last 24 Hrs  T(C): 36.6 (09 Sep 2022 07:45), Max: 36.6 (08 Sep 2022 20:00)  T(F): 97.9 (09 Sep 2022 07:45), Max: 97.9 (08 Sep 2022 20:00)  HR: 59 (09 Sep 2022 07:45) (46 - 128)  BP: 140/65 (09 Sep 2022 07:45) (64/40 - 192/74)  BP(mean): 94 (09 Sep 2022 07:45) (48 - 120)  ABP: --  ABP(mean): --  RR: 32 (09 Sep 2022 07:45) (14 - 39)  SpO2: 100% (09 Sep 2022 07:45) (98% - 100%)    O2 Parameters below as of 09 Sep 2022 07:45  Patient On (Oxygen Delivery Method): ventilator    O2 Concentration (%): 40        General:  HEENT: MIC; ETT   Lymphatic system: No cervical LN   Lungs: Bilateral BS equal  Cardiovascular: Regular   Gastrointestinal: Soft, Positive BS  Extremities: No clubbing.  Moves extremities.  Full Range of motion   Skin: Warm, intact  Neurological: No motor or sensory deficit       22 @ 07:01  -  22 @ 07:00  --------------------------------------------------------  IN:    Dexmedetomidine: 4 mL    Dexmedetomidine: 61.2 mL    FentaNYL: 3 mL    IV PiggyBack: 350 mL    Lactated Ringers: 1800 mL    Peptamen A.F.: 960 mL    Sodium Chloride 0.9% Bolus: 250 mL  Total IN: 3428 mL    OUT:    Indwelling Catheter - Urethral (mL): 435 mL  Total OUT: 435 mL    Total NET: 2993.2 mL      22 @ 07:01  -  22 @ 08:22  --------------------------------------------------------  IN:    Dexmedetomidine: 3.7 mL    Lactated Ringers: 75 mL  Total IN: 78.7 mL    OUT:    Indwelling Catheter - Urethral (mL): 30 mL  Total OUT: 30 mL    Total NET: 48.7 mL          LABS:                            10.5   10.22 )-----------( 221      ( 09 Sep 2022 04:53 )             32.7                                                   138  |  104  |  36<H>  ----------------------------<  162<H>  4.6   |  27  |  1.0    Ca    8.3<L>      09 Sep 2022 04:53  Mg     2.7         TPro  5.0<L>  /  Alb  3.1<L>  /  TBili  0.3  /  DBili  x   /  AST  14  /  ALT  10  /  AlkPhos  106  09                                             Urinalysis Basic - ( 07 Sep 2022 13:33 )    Color: Colorless / Appearance: Clear / S.018 / pH: x  Gluc: x / Ketone: Negative  / Bili: Negative / Urobili: <2 mg/dL   Blood: x / Protein: Trace / Nitrite: Negative   Leuk Esterase: Negative / RBC: x / WBC x   Sq Epi: x / Non Sq Epi: x / Bacteria: x        CARDIAC MARKERS ( 08 Sep 2022 06:04 )  x     / 0.03 ng/mL / x     / x     / x      CARDIAC MARKERS ( 07 Sep 2022 21:36 )  x     / 0.03 ng/mL / x     / x     / x      CARDIAC MARKERS ( 07 Sep 2022 11:35 )  x     / 0.04 ng/mL / x     / x     / x                                                LIVER FUNCTIONS - ( 09 Sep 2022 04:53 )  Alb: 3.1 g/dL / Pro: 5.0 g/dL / ALK PHOS: 106 U/L / ALT: 10 U/L / AST: 14 U/L / GGT: x                                                  Culture - Urine (collected 07 Sep 2022 13:33)  Source: Clean Catch Clean Catch (Midstream)  Final Report (08 Sep 2022 21:19):    <10,000 CFU/mL Normal Urogenital Marsha    Culture - Blood (collected 07 Sep 2022 11:35)  Source: .Blood Blood  Preliminary Report (08 Sep 2022 23:01):    No growth to date.    Culture - Blood (collected 07 Sep 2022 11:35)  Source: .Blood Blood  Preliminary Report (08 Sep 2022 23:01):    No growth to date.                                                   Mode: AC/ CMV (Assist Control/ Continuous Mandatory Ventilation)  RR (machine): 14  TV (machine): 400  FiO2: 40  PEEP: 5  ITime: 1  MAP: 8  PIP: 20                                      ABG - ( 09 Sep 2022 03:47 )  pH, Arterial: 7.41  pH, Blood: x     /  pCO2: 38    /  pO2: 172   / HCO3: 24    / Base Excess: -0.3  /  SaO2: 100.0               MEDICATIONS  (STANDING):  atorvastatin 20 milliGRAM(s) Oral at bedtime  cefepime   IVPB 2000 milliGRAM(s) IV Intermittent every 12 hours  chlorhexidine 0.12% Liquid 15 milliLiter(s) Oral Mucosa two times a day  chlorhexidine 2% Cloths 1 Application(s) Topical <User Schedule>  dexMEDEtomidine Infusion 0.2 MICROgram(s)/kG/Hr (3 mL/Hr) IV Continuous <Continuous>  enoxaparin Injectable 40 milliGRAM(s) SubCutaneous every 24 hours  lactated ringers. 1000 milliLiter(s) (75 mL/Hr) IV Continuous <Continuous>  levothyroxine 75 MICROGram(s) Oral daily  mupirocin 2% Ointment 1 Application(s) Topical two times a day  pantoprazole  Injectable 40 milliGRAM(s) IV Push daily  vancomycin  IVPB 750 milliGRAM(s) IV Intermittent every 12 hours    MEDICATIONS  (PRN):  acetaminophen     Tablet .. 650 milliGRAM(s) Oral every 6 hours PRN Temp greater or equal to 38C (100.4F), Mild Pain (1 - 3)      Xrays: no infiltrates                                                                                    ECHO

## 2022-09-10 NOTE — PROGRESS NOTE ADULT - ASSESSMENT
IMPRESSION:    Acute hypercapnic respiratory failure  Acute hypoxemic respiratory failure  THERESA with decreased urine output - resolved   H/o Hypothyroidism  Altered mental status   SIRS    PLAN:    CNS: CT head no acute changes. Daily spontaneous awakening trial. Hold sedation. Routine EEG no signs of seizure activity. Now following commands.     HEENT: Oral care; ETT care    PULMONARY: CTA chest no PE. CXR showing left basilar opacity. HOB @ 45 degrees. SBT when awake today.     CARDIOVASCULAR: No IVF. Trops are negative. Keep MAP more than 65mmhg. Lasix 20mg IV once today.     GI: Hold OGT feeds for possible SBTs. Bowel regimen. GI prophylaxis.     RENAL: Follow up lytes. Correct as needed. Patrick catheter.     INFECTIOUS DISEASE: Leukocytosis resolved. UA negative. Cultures negative. COVID PCR positive, Rocephin and Doxy for total of 5 days.     HEMATOLOGICAL: DVT prophylaxis: Lovenox SQ.     ENDOCRINE: Follow up FS. Insulin protocol if needed. Continue home meds. Check TSH.     MUSCULOSKELETAL: Bed rest    DISPO: Critically ill and needs ICU level of care.    IMPRESSION:    Acute hypercapnic respiratory failure  Acute hypoxemic respiratory failure  THERESA with decreased urine output - resolved   H/o Hypothyroidism  Altered mental status   SIRS    PLAN:    CNS: CT head no acute changes. Daily spontaneous awakening trial. Hold sedation. Routine EEG no signs of seizure activity. Now following commands.     HEENT: Oral care; ETT care    PULMONARY: CTA chest no PE. CXR showing left basilar opacity. HOB @ 45 degrees. SBT when awake today. Pull ETT 1 cm.     CARDIOVASCULAR: No IVF. Trops are negative. Keep MAP more than 65mmhg. Lasix 20mg IV once today.     GI: Hold OGT feeds for possible SBTs. Bowel regimen. GI prophylaxis.     RENAL: Follow up lytes. Correct as needed. Patrick catheter.     INFECTIOUS DISEASE: Leukocytosis resolved. UA negative. Cultures negative. COVID PCR positive, Rocephin and Doxy for total of 5 days.     HEMATOLOGICAL: DVT prophylaxis: Lovenox SQ.     ENDOCRINE: Follow up FS. Insulin protocol if needed. Continue home meds. Check TSH.     MUSCULOSKELETAL: Bed rest    DISPO: Critically ill and needs ICU level of care.

## 2022-09-10 NOTE — PROGRESS NOTE ADULT - ASSESSMENT
IMPRESSION:    Acute hypercapnic respiratory failure  Acute hypoxemic respiratory failure  THERESA with decreased urine output  H/o Hypothyroidism  Altered mental status   SIRS    PLAN:    CNS: CT head no acute changes. Spontaneous awakening trial. Hold sedation. Routine EEG no signs of seizure activity. following commands.     HEENT: Oral care; ETT care    PULMONARY: CTA chest no PE, no opacities or consolidations. HOB @ 45 degrees. SBT when awake.     CARDIOVASCULAR: DC IVF. Trops are negative. Keep MAP more than 65mmhg.     GI: Hold OGT feeds for possible extubation. Bowel regimen. GI prophylaxis.     RENAL: Follow up lytes. Correct as needed. Patrick catheter.     INFECTIOUS DISEASE: Leukocytosis resolved. UA negative. Cultures negative CT chest no acute infiltrates. COVID PCR positive, Rocephin and Doxy for  total of 5 days.     HEMATOLOGICAL: DVT prophylaxis: Lovenox SQ.     ENDOCRINE: Follow up FS. Insulin protocol if needed. Continue home meds. Check TSH.     MUSCULOSKELETAL: Bed rest    DISPO: Critically ill and needs ICU level of care.

## 2022-09-10 NOTE — PROGRESS NOTE ADULT - SUBJECTIVE AND OBJECTIVE BOX
Patient is a 89y old  Female who presents with a chief complaint of AMS, respiratory failure (09 Sep 2022 15:03)    HPI:  89yo F w/ pmhx of HTN, HLD, hypothyroidism, recent left femur fracture s/p surgery 1mo in [facility] presented to ED from [facility] with hypoxia and hypotension. Patient was saturating 50% on room air and improved only minimally on NRB in the ED. History was not able to be obtained by the patient as she was unresponsive and she was intubated for airway protection. Collateral HPI obtained by daughter Becky who said she was sent to the NH for rehab for her hip fracture. She denies any history of dementia. Baseline mental status is AAOx3 and prior to hip fracture patient was living by herself.     Of note patient was being treated in NH for R sided pneumonia (CXR 9/6: R perihilar infiltrate) and was receiving levaquin.     ED course:  vitals: /84, HR 73, hypothermic T 93s, O2 100% on BVM  labs: WBC 14.96, trop 0.04, , ABG pH 7.09, PCO2 93, PO2 51  imaging:   - CXR: wet read: wnl   - CT angio chest PE: no PE, lungs wnl   - CT head: No acute intracranial pathology. No evidence of midline shift, mass effect or intracranial hemorrhage.  given: vanc, cefepime, LR 1800ml  (07 Sep 2022 15:40)       INTERVAL HPI/OVERNIGHT EVENTS:   No overnight events   Afebrile, hemodynamically stable     Subjective:  agitated. off sedation.      ICU Vital Signs Last 24 Hrs  T(C): 37.1 (10 Sep 2022 04:00), Max: 37.1 (09 Sep 2022 16:00)  T(F): 98.7 (10 Sep 2022 04:00), Max: 98.8 (09 Sep 2022 16:00)  HR: 80 (10 Sep 2022 06:00) (59 - 110)  BP: 139/65 (10 Sep 2022 06:00) (78/49 - 191/79)  BP(mean): 93 (10 Sep 2022 06:00) (59 - 130)  ABP: --  ABP(mean): --  RR: 29 (10 Sep 2022 06:00) (14 - 38)  SpO2: 100% (10 Sep 2022 06:00) (93% - 100%)    O2 Parameters below as of 10 Sep 2022 04:00  Patient On (Oxygen Delivery Method): ventilator    O2 Concentration (%): 40      I&O's Summary    08 Sep 2022 07:01  -  09 Sep 2022 07:00  --------------------------------------------------------  IN: 3428.2 mL / OUT: 435 mL / NET: 2993.2 mL    09 Sep 2022 07:01  -  10 Sep 2022 06:47  --------------------------------------------------------  IN: 1582.3 mL / OUT: 2625 mL / NET: -1042.7 mL      Mode: AC/ CMV (Assist Control/ Continuous Mandatory Ventilation)  RR (machine): 14  TV (machine): 400  FiO2: 40  PEEP: 5  ITime: 1  MAP: 8  PIP: 22      Daily     Daily     Adult Advanced Hemodynamics Last 24 Hrs  CVP(mm Hg): --  CVP(cm H2O): --  CO: --  CI: --  PA: --  PA(mean): --  PCWP: --  SVR: --  SVRI: --  PVR: --  PVRI: --    EKG/Telemetry Events:    MEDICATIONS  (STANDING):  atorvastatin 20 milliGRAM(s) Oral at bedtime  cefTRIAXone   IVPB 1000 milliGRAM(s) IV Intermittent every 24 hours  chlorhexidine 0.12% Liquid 15 milliLiter(s) Oral Mucosa two times a day  chlorhexidine 2% Cloths 1 Application(s) Topical <User Schedule>  dexMEDEtomidine Infusion 0.2 MICROgram(s)/kG/Hr (3 mL/Hr) IV Continuous <Continuous>  doxycycline IVPB 100 milliGRAM(s) IV Intermittent every 12 hours  enoxaparin Injectable 40 milliGRAM(s) SubCutaneous every 24 hours  fentaNYL   Infusion. 0.5 MICROgram(s)/kG/Hr (3 mL/Hr) IV Continuous <Continuous>  levothyroxine 75 MICROGram(s) Oral daily  mupirocin 2% Ointment 1 Application(s) Topical two times a day  norepinephrine Infusion 0.05 MICROgram(s)/kG/Min (5.63 mL/Hr) IV Continuous <Continuous>  pantoprazole  Injectable 40 milliGRAM(s) IV Push daily  potassium phosphate / sodium phosphate Powder (PHOS-NaK) 3 Packet(s) Oral once  propofol Infusion 5 MICROgram(s)/kG/Min (1.46 mL/Hr) IV Continuous <Continuous>    MEDICATIONS  (PRN):  acetaminophen     Tablet .. 650 milliGRAM(s) Oral every 6 hours PRN Temp greater or equal to 38C (100.4F), Mild Pain (1 - 3)      PHYSICAL EXAM:  GENERAL:   HEAD:  Atraumatic, Normocephalic  EYES: conjunctiva and sclera clear  NERVOUS SYSTEM:  Alert & Awake.   CHEST/LUNG: B/L good air entry; No rales, rhonchi, or wheezing  HEART: S1S2 normal, Regular rate and rhythm; No murmurs  ABDOMEN: Soft, Nontender, distended  EXTREMITIES: bilateral edema       LABS:                        11.0   10.73 )-----------( 226      ( 10 Sep 2022 04:42 )             33.5     09-10    139  |  100  |  38<H>  ----------------------------<  144<H>  3.8   |  27  |  1.0    Ca    8.2<L>      10 Sep 2022 04:42  Phos  1.3     09-10  Mg     2.0     09-10    TPro  5.4<L>  /  Alb  3.3<L>  /  TBili  0.2  /  DBili  x   /  AST  14  /  ALT  10  /  AlkPhos  113  09-10    LIVER FUNCTIONS - ( 10 Sep 2022 04:42 )  Alb: 3.3 g/dL / Pro: 5.4 g/dL / ALK PHOS: 113 U/L / ALT: 10 U/L / AST: 14 U/L / GGT: x             CAPILLARY BLOOD GLUCOSE      POCT Blood Glucose.: 129 mg/dL (10 Sep 2022 06:35)  POCT Blood Glucose.: 129 mg/dL (10 Sep 2022 00:27)  POCT Blood Glucose.: 113 mg/dL (09 Sep 2022 18:11)  POCT Blood Glucose.: 121 mg/dL (09 Sep 2022 12:05)    ABG - ( 10 Sep 2022 04:25 )  pH, Arterial: 7.45  pH, Blood: x     /  pCO2: 41    /  pO2: 117   / HCO3: 28    / Base Excess: 4.1   /  SaO2: 99.9                RADIOLOGY & ADDITIONAL TESTS:  CXR:        Care Discussed with Consultants/Other Providers [ x] YES  [ ] NO

## 2022-09-10 NOTE — PROGRESS NOTE ADULT - SUBJECTIVE AND OBJECTIVE BOX
Patient is a 89y old  Female who presents with a chief complaint of AMS, respiratory failure (10 Sep 2022 06:43)        Over Night Events:    No events   Not on pressors   Remains on the ventilator   Awake but drowsy        ROS:  See HPI    PHYSICAL EXAM    ICU Vital Signs Last 24 Hrs  T(C): 36.7 (10 Sep 2022 08:00), Max: 37.1 (09 Sep 2022 16:00)  T(F): 98 (10 Sep 2022 08:00), Max: 98.8 (09 Sep 2022 16:00)  HR: 94 (10 Sep 2022 08:00) (61 - 110)  BP: 183/74 (10 Sep 2022 08:00) (78/49 - 191/79)  BP(mean): 107 (10 Sep 2022 08:00) (59 - 130)  ABP: --  ABP(mean): --  RR: 29 (10 Sep 2022 08:00) (14 - 38)  SpO2: 100% (10 Sep 2022 08:00) (93% - 100%)    O2 Parameters below as of 10 Sep 2022 08:00  Patient On (Oxygen Delivery Method): ventilator    O2 Concentration (%): 40        General: intubated  HEENT: MIC             Lymphatic system: No cervical LN   Lungs: Bilateral BS, clear  Cardiovascular: Regular   Gastrointestinal: Soft, Positive BS  Extremities: No clubbing.  Moves extremities.  Full Range of motion   Skin: Warm, intact  Neurological: No motor or sensory deficit; follows commands      09-09-22 @ 07:01  -  09-10-22 @ 07:00  --------------------------------------------------------  IN:    Dexmedetomidine: 57.3 mL    Enteral Tube Flush: 120 mL    IV PiggyBack: 250 mL    Lactated Ringers: 75 mL    Peptamen A.F.: 1080 mL  Total IN: 1582.3 mL    OUT:    Indwelling Catheter - Urethral (mL): 2645 mL  Total OUT: 2645 mL    Total NET: -1062.7 mL      09-10-22 @ 07:01  -  09-10-22 @ 09:07  --------------------------------------------------------  IN:  Total IN: 0 mL    OUT:    Dexmedetomidine: 0 mL    Indwelling Catheter - Urethral (mL): 25 mL  Total OUT: 25 mL    Total NET: -25 mL          LABS:                            11.0   10.73 )-----------( 226      ( 10 Sep 2022 04:42 )             33.5                                               09-10    139  |  100  |  38<H>  ----------------------------<  144<H>  3.8   |  27  |  1.0    Ca    8.2<L>      10 Sep 2022 04:42  Phos  1.3     09-10  Mg     2.0     09-10    TPro  5.4<L>  /  Alb  3.3<L>  /  TBili  0.2  /  DBili  x   /  AST  14  /  ALT  10  /  AlkPhos  113  09-10                                                                                           LIVER FUNCTIONS - ( 10 Sep 2022 04:42 )  Alb: 3.3 g/dL / Pro: 5.4 g/dL / ALK PHOS: 113 U/L / ALT: 10 U/L / AST: 14 U/L / GGT: x                                                  Culture - Urine (collected 07 Sep 2022 13:33)  Source: Clean Catch Clean Catch (Midstream)  Final Report (08 Sep 2022 21:19):    <10,000 CFU/mL Normal Urogenital Marsha    Culture - Blood (collected 07 Sep 2022 11:35)  Source: .Blood Blood  Preliminary Report (08 Sep 2022 23:01):    No growth to date.    Culture - Blood (collected 07 Sep 2022 11:35)  Source: .Blood Blood  Preliminary Report (08 Sep 2022 23:01):    No growth to date.                                                   Mode: AC/ CMV (Assist Control/ Continuous Mandatory Ventilation)  RR (machine): 14  TV (machine): 400  FiO2: 40  PEEP: 5  ITime: 1  MAP: 8  PIP: 22                                      ABG - ( 10 Sep 2022 04:25 )  pH, Arterial: 7.45  pH, Blood: x     /  pCO2: 41    /  pO2: 117   / HCO3: 28    / Base Excess: 4.1   /  SaO2: 99.9                MEDICATIONS  (STANDING):  atorvastatin 20 milliGRAM(s) Oral at bedtime  cefTRIAXone   IVPB 1000 milliGRAM(s) IV Intermittent every 24 hours  chlorhexidine 0.12% Liquid 15 milliLiter(s) Oral Mucosa two times a day  chlorhexidine 2% Cloths 1 Application(s) Topical <User Schedule>  dexMEDEtomidine Infusion 0.2 MICROgram(s)/kG/Hr (3 mL/Hr) IV Continuous <Continuous>  doxycycline IVPB 100 milliGRAM(s) IV Intermittent every 12 hours  enoxaparin Injectable 40 milliGRAM(s) SubCutaneous every 24 hours  levothyroxine 75 MICROGram(s) Oral daily  mupirocin 2% Ointment 1 Application(s) Topical two times a day  pantoprazole  Injectable 40 milliGRAM(s) IV Push daily  potassium phosphate / sodium phosphate Powder (PHOS-NaK) 3 Packet(s) Oral once    MEDICATIONS  (PRN):  acetaminophen     Tablet .. 650 milliGRAM(s) Oral every 6 hours PRN Temp greater or equal to 38C (100.4F), Mild Pain (1 - 3)      Xrays: Small left effusion/opacity                                                                                    ECHO

## 2022-09-11 NOTE — AIRWAY PLACEMENT NOTE ADULT - AIRWAY COMMENTS:
Called by CCU for tube exchange. Pt reintubated with 7.0 OETT over Bougie under VL guidance. Tube secured at 20cm lip line, + ETCO2, BL BS, CXR pending.

## 2022-09-11 NOTE — PROGRESS NOTE ADULT - ASSESSMENT
IMPRESSION:    Acute hypercapnic respiratory failure  Acute hypoxemic respiratory failure  THERESA with decreased urine output - resolved   H/o Hypothyroidism  Altered mental status   SIRS  LLL opacity/pneumonia    PLAN:    CNS: CT head no acute changes. Daily spontaneous awakening trial. Sedation held this morning. Routine EEG no signs of seizure activity. Following commands.     HEENT: Oral care; ETT care    PULMONARY: CTA chest no PE. CXR showing left basilar opacity/effusion. HOB @ 45 degrees.Low minute ventilation on SBT with PS of 8. Not a candidate to extubate yet. Weak and frail.     CARDIOVASCULAR: No IVF. Trops are negative. Keep MAP more than 65mmhg. Fluid balance negative by 1L.    GI: OGT feeds to be restrated after SBT. Bowel regimen. GI prophylaxis.     RENAL: Follow up lytes. Correct as needed. Patrick catheter. Kidney function back to baseline.    INFECTIOUS DISEASE: Leukocytosis resolved. UA negative. Cultures negative. COVID PCR positive. Rocephin and Doxy for total of 5 days for LLL pneumonia.     HEMATOLOGICAL: DVT prophylaxis: Lovenox SQ.     ENDOCRINE: Follow up FS. Insulin protocol if needed. Continue home meds. Check TSH.     MUSCULOSKELETAL: Bed in chair position.

## 2022-09-11 NOTE — PROGRESS NOTE ADULT - ASSESSMENT
IMPRESSION:    Acute hypercapnic respiratory failure  Acute hypoxemic respiratory failure  THERESA with decreased urine output - resolved   H/o Hypothyroidism  Altered mental status   SIRS    PLAN:    CNS: CT head no acute changes. Daily spontaneous awakening trial. Sedation held this morning. Routine EEG no signs of seizure activity. Following commands.     HEENT: Oral care; ETT care    PULMONARY: CTA chest no PE. CXR showing left basilar opacity; repeat CXR today. HOB @ 45 degrees. SBT today showed low MV. Not a candidate to extubate.     CARDIOVASCULAR: No IVF. Trops are negative. Keep MAP more than 65mmhg.     GI: Hold OGT feeds for possible SBTs. Bowel regimen. GI prophylaxis.     RENAL: Follow up lytes. Correct as needed. Patrick catheter.     INFECTIOUS DISEASE: Leukocytosis resolved. UA negative. Cultures negative. COVID PCR positive. Rocephin and Doxy for total of 5 days.     HEMATOLOGICAL: DVT prophylaxis: Lovenox SQ.     ENDOCRINE: Follow up FS. Insulin protocol if needed. Continue home meds. Check TSH.     MUSCULOSKELETAL: Bed in chair position.    DISPO: Critically ill and needs ICU level of care.    IMPRESSION:    Acute hypercapnic respiratory failure  Acute hypoxemic respiratory failure  THERESA with decreased urine output - resolved   H/o Hypothyroidism  Altered mental status   SIRS  LLL opacity/pneumonia    PLAN:    CNS: CT head no acute changes. Daily spontaneous awakening trial. Sedation held this morning. Routine EEG no signs of seizure activity. Following commands.     HEENT: Oral care; ETT care    PULMONARY: CTA chest no PE. CXR showing left basilar opacity/effusion. HOB @ 45 degrees.Low minute ventilation on SBT with PS of 8. Not a candidate to extubate yet. Weak and frail.     CARDIOVASCULAR: No IVF. Trops are negative. Keep MAP more than 65mmhg. Fluid balance negative by 1L.    GI: OGT feeds to be restrated after SBT. Bowel regimen. GI prophylaxis.     RENAL: Follow up lytes. Correct as needed. Patrick catheter. Kidney function back to baseline.    INFECTIOUS DISEASE: Leukocytosis resolved. UA negative. Cultures negative. COVID PCR positive. Rocephin and Doxy for total of 5 days for LLL pneumonia.     HEMATOLOGICAL: DVT prophylaxis: Lovenox SQ.     ENDOCRINE: Follow up FS. Insulin protocol if needed. Continue home meds. Check TSH.     MUSCULOSKELETAL: Bed in chair position.    DISPO: Critically ill and needs ICU level of care.

## 2022-09-11 NOTE — AIRWAY PLACEMENT NOTE ADULT - POST AIRWAY PLACEMENT ASSESSMENT:
breath sounds bilateral/breath sounds equal/positive end tidal CO2 noted Itraconazole Counseling:  I discussed with the patient the risks of itraconazole including but not limited to liver damage, nausea/vomiting, neuropathy, and severe allergy.  The patient understands that this medication is best absorbed when taken with acidic beverages such as non-diet cola or ginger ale.  The patient understands that monitoring is required including baseline LFTs and repeat LFTs at intervals.  The patient understands that they are to contact us or the primary physician if concerning signs are noted.

## 2022-09-11 NOTE — PROGRESS NOTE ADULT - SUBJECTIVE AND OBJECTIVE BOX
Patient is a 89y old  Female who presents with a chief complaint of AMS, respiratory failure (10 Sep 2022 09:07)        Over Night Events:    Remains on the ventilator  Now off sedation this morning  Failed SBT yesterday         ROS:  See HPI    PHYSICAL EXAM    ICU Vital Signs Last 24 Hrs  T(C): 36.3 (11 Sep 2022 04:00), Max: 36.6 (10 Sep 2022 12:00)  T(F): 97.4 (11 Sep 2022 04:00), Max: 97.8 (10 Sep 2022 12:00)  HR: 84 (11 Sep 2022 07:00) (72 - 107)  BP: 119/71 (11 Sep 2022 07:00) (99/62 - 183/86)  BP(mean): 89 (11 Sep 2022 07:00) (73 - 124)  ABP: --  ABP(mean): --  RR: 23 (11 Sep 2022 07:00) (14 - 44)  SpO2: 98% (11 Sep 2022 07:00) (95% - 100%)    O2 Parameters below as of 11 Sep 2022 04:00  Patient On (Oxygen Delivery Method): ventilator    O2 Concentration (%): 35        General: intubated  HEENT: MIC             Lymphatic system: No cervical LN   Lungs: Bilateral BS equal   Cardiovascular: Regular   Gastrointestinal: Soft, Positive BS  Extremities: No clubbing.  Moves extremities.  Full Range of motion   Skin: Warm, intact  Neurological: No motor or sensory deficit       09-10-22 @ 07:01  -  09-11-22 @ 07:00  --------------------------------------------------------  IN:    Dexmedetomidine: 21.7 mL    IV PiggyBack: 150 mL    Peptamen A.F.: 1080 mL  Total IN: 1251.7 mL    OUT:    Indwelling Catheter - Urethral (mL): 2380 mL  Total OUT: 2380 mL    Total NET: -1128.3 mL          LABS:                            11.6   8.71  )-----------( 220      ( 11 Sep 2022 04:52 )             34.9                                               09-11    140  |  96<L>  |  44<H>  ----------------------------<  174<H>  3.4<L>   |  31  |  0.9    Ca    8.3<L>      11 Sep 2022 04:52  Phos  1.3     09-10  Mg     1.8     09-11    TPro  5.5<L>  /  Alb  3.3<L>  /  TBili  0.3  /  DBili  x   /  AST  18  /  ALT  12  /  AlkPhos  99  09-11                                                                                           LIVER FUNCTIONS - ( 11 Sep 2022 04:52 )  Alb: 3.3 g/dL / Pro: 5.5 g/dL / ALK PHOS: 99 U/L / ALT: 12 U/L / AST: 18 U/L / GGT: x                                                                                               Mode: AC/ CMV (Assist Control/ Continuous Mandatory Ventilation)  RR (machine): 14  TV (machine): 400  FiO2: 35  PEEP: 5  ITime: 1  MAP: 10  PIP: 27                                      ABG - ( 10 Sep 2022 04:25 )  pH, Arterial: 7.45  pH, Blood: x     /  pCO2: 41    /  pO2: 117   / HCO3: 28    / Base Excess: 4.1   /  SaO2: 99.9                MEDICATIONS  (STANDING):  atorvastatin 20 milliGRAM(s) Oral at bedtime  cefTRIAXone   IVPB 1000 milliGRAM(s) IV Intermittent every 24 hours  chlorhexidine 0.12% Liquid 15 milliLiter(s) Oral Mucosa two times a day  chlorhexidine 2% Cloths 1 Application(s) Topical <User Schedule>  dexMEDEtomidine Infusion 0.2 MICROgram(s)/kG/Hr (3 mL/Hr) IV Continuous <Continuous>  doxycycline IVPB 100 milliGRAM(s) IV Intermittent every 12 hours  enoxaparin Injectable 40 milliGRAM(s) SubCutaneous every 24 hours  levothyroxine 75 MICROGram(s) Oral daily  mupirocin 2% Ointment 1 Application(s) Topical two times a day  pantoprazole  Injectable 40 milliGRAM(s) IV Push daily    MEDICATIONS  (PRN):  acetaminophen     Tablet .. 650 milliGRAM(s) Oral every 6 hours PRN Temp greater or equal to 38C (100.4F), Mild Pain (1 - 3)      Xrays: pending for today                                                                                    ECHO

## 2022-09-11 NOTE — PROGRESS NOTE ADULT - SUBJECTIVE AND OBJECTIVE BOX
Patient is a 89y old  Female who presents with a chief complaint of AMS, respiratory failure (11 Sep 2022 08:56)    HPI:  89yo F w/ pmhx of HTN, HLD, hypothyroidism, recent left femur fracture s/p surgery 1mo in [facility] presented to ED from [facility] with hypoxia and hypotension. Patient was saturating 50% on room air and improved only minimally on NRB in the ED. History was not able to be obtained by the patient as she was unresponsive and she was intubated for airway protection. Collateral HPI obtained by daughter Becky who said she was sent to the NH for rehab for her hip fracture. She denies any history of dementia. Baseline mental status is AAOx3 and prior to hip fracture patient was living by herself.     Of note patient was being treated in NH for R sided pneumonia (CXR : R perihilar infiltrate) and was receiving levaquin.     ED course:  vitals: /84, HR 73, hypothermic T 93s, O2 100% on BVM  labs: WBC 14.96, trop 0.04, , ABG pH 7.09, PCO2 93, PO2 51  imaging:   - CXR: wet read: wnl   - CT angio chest PE: no PE, lungs wnl   - CT head: No acute intracranial pathology. No evidence of midline shift, mass effect or intracranial hemorrhage.  given: vanc, cefepime, LR 1800ml  (07 Sep 2022 15:40)       INTERVAL HPI/OVERNIGHT EVENTS:   No overnight events   Afebrile, hemodynamically stable     Subjective:    ICU Vital Signs Last 24 Hrs  T(C): 36.1 (11 Sep 2022 16:00), Max: 36.5 (11 Sep 2022 00:00)  T(F): 97 (11 Sep 2022 16:00), Max: 97.7 (11 Sep 2022 00:00)  HR: 85 (11 Sep 2022 19:00) (72 - 107)  BP: 153/69 (11 Sep 2022 19:00) (99/62 - 184/87)  BP(mean): 99 (11 Sep 2022 19:00) (73 - 129)  ABP: --  ABP(mean): --  RR: 20 (11 Sep 2022 19:00) (14 - 52)  SpO2: 100% (11 Sep 2022 19:00) (95% - 100%)    O2 Parameters below as of 11 Sep 2022 19:00  Patient On (Oxygen Delivery Method): ventilator    O2 Concentration (%): 35      I&O's Summary    10 Sep 2022 07:01  -  11 Sep 2022 07:00  --------------------------------------------------------  IN: 1251.7 mL / OUT: 2380 mL / NET: -1128.3 mL    11 Sep 2022 07:01  -  11 Sep 2022 19:29  --------------------------------------------------------  IN: 970 mL / OUT: 405 mL / NET: 565 mL      Mode: AC/ CMV (Assist Control/ Continuous Mandatory Ventilation)  RR (machine): 14  TV (machine): 400  FiO2: 35  PEEP: 5  ITime: 1  MAP: 9  PIP: 21      Daily     Daily Weight in k.8 (11 Sep 2022 06:00)        EKG/Telemetry Events: N/A    MEDICATIONS  (STANDING):  atorvastatin 20 milliGRAM(s) Oral at bedtime  cefTRIAXone   IVPB 1000 milliGRAM(s) IV Intermittent every 24 hours  chlorhexidine 0.12% Liquid 15 milliLiter(s) Oral Mucosa two times a day  chlorhexidine 2% Cloths 1 Application(s) Topical <User Schedule>  dexMEDEtomidine Infusion 0.2 MICROgram(s)/kG/Hr (3 mL/Hr) IV Continuous <Continuous>  doxycycline IVPB 100 milliGRAM(s) IV Intermittent every 12 hours  enoxaparin Injectable 40 milliGRAM(s) SubCutaneous every 24 hours  levothyroxine 75 MICROGram(s) Oral daily  mupirocin 2% Ointment 1 Application(s) Topical two times a day  norepinephrine Infusion 0.05 MICROgram(s)/kG/Min (5.63 mL/Hr) IV Continuous <Continuous>  pantoprazole  Injectable 40 milliGRAM(s) IV Push daily    MEDICATIONS  (PRN):  acetaminophen     Tablet .. 650 milliGRAM(s) Oral every 6 hours PRN Temp greater or equal to 38C (100.4F), Mild Pain (1 - 3)      PHYSICAL EXAM:  GENERAL: Agitated  HEAD:  Atraumatic, Normocephalic  EYES: EOMI, PERRLA, conjunctiva and sclera clear  NECK: Supple, No JVD, Normal thyroid, no enlarged nodes  NERVOUS SYSTEM:  Alert & Awake, agitated on sedation  CHEST/LUNG: B/L good air entry; No rales, rhonchi, or wheezing  HEART: S1S2 normal, no S3, Regular rate and rhythm; No murmurs  ABDOMEN: Soft, Nontender, Nondistended; Bowel sounds present  EXTREMITIES:  2+ Peripheral Pulses, No clubbing, cyanosis, or edema  LYMPH: No lymphadenopathy noted  SKIN: No rashes or lesions    LABS:                        11.6   8.71  )-----------( 220      ( 11 Sep 2022 04:52 )             34.9     09-11    140  |  96<L>  |  44<H>  ----------------------------<  174<H>  3.4<L>   |  31  |  0.9    Ca    8.3<L>      11 Sep 2022 04:52  Phos  1.3     09-10  Mg     1.8     09-11    TPro  5.5<L>  /  Alb  3.3<L>  /  TBili  0.3  /  DBili  x   /  AST  18  /  ALT  12  /  AlkPhos  99  09-11    LIVER FUNCTIONS - ( 11 Sep 2022 04:52 )  Alb: 3.3 g/dL / Pro: 5.5 g/dL / ALK PHOS: 99 U/L / ALT: 12 U/L / AST: 18 U/L / GGT: x             CAPILLARY BLOOD GLUCOSE      POCT Blood Glucose.: 132 mg/dL (11 Sep 2022 17:56)  POCT Blood Glucose.: 152 mg/dL (11 Sep 2022 12:41)  POCT Blood Glucose.: 166 mg/dL (11 Sep 2022 06:49)    ABG - ( 10 Sep 2022 04:25 )  pH, Arterial: 7.45  pH, Blood: x     /  pCO2: 41    /  pO2: 117   / HCO3: 28    / Base Excess: 4.1   /  SaO2: 99.9                          RADIOLOGY & ADDITIONAL TESTS:  CXR:        Care Discussed with Consultants/Other Providers [ x] YES  [ ] NO

## 2022-09-12 NOTE — PROGRESS NOTE ADULT - ASSESSMENT
IMPRESSION:    Acute hypercapnic respiratory failure  Acute hypoxemic respiratory failure  THERESA with decreased urine output - resolved   H/o Hypothyroidism  Altered mental status   SIRS  LLL opacity/pneumonia    PLAN:    CNS: CT head no acute changes. Daily spontaneous awakening trial. Sedation held yesterday. Routine EEG no signs of seizure activity. Following commands.     HEENT: Oral care; ETT care    PULMONARY: CTA chest no PE. CXR showing left basilar opacity/effusion. HOB @ 45 degrees.Low minute ventilation on SBT with PS of 8.   - SBT/SAT trial; keep VANESSA = 0    CARDIOVASCULAR: No IVF. Trops are negative. Keep MAP more than 65mmhg. Fluid balance negative by 1L.    GI: OGT feeds to be restrated after SBT. Bowel regimen. GI prophylaxis.   - Monitor Bowel Movement; started on Docalax    RENAL: Follow up lytes. Correct as needed. Patrick catheter. Kidney function back to baseline.  - Urine output decreased: 20mL/hour    INFECTIOUS DISEASE: Leukocytosis resolved. UA negative. Cultures negative. COVID PCR positive.   -Stop Rocephin and Doxy on 9/13 for total of 5 days for LLL pneumonia.     HEMATOLOGICAL: DVT prophylaxis: Lovenox SQ.     ENDOCRINE: Follow up FS. Insulin protocol if needed. Continue home meds.   - Check TSH    MUSCULOSKELETAL: Bed in chair position.  - PT/OT Recommendations    DISPO: Critically ill and needs ICU level of care.  IMPRESSION:    Acute hypercapnic respiratory failure  Acute hypoxemic respiratory failure  THERESA with decreased urine output - resolved   H/o Hypothyroidism  Altered mental status   SIRS  LLL opacity/pneumonia    PLAN:    CNS: CT head no acute changes. Daily spontaneous awakening trial. Sedation held yesterday. Routine EEG no signs of seizure activity. Following commands. Daily SAT    HEENT: Oral care; ETT care    PULMONARY: CTA chest no PE. CXR showing left basilar opacity/effusion. HOB @ 45 degrees.Low minute ventilation on SBT with PS of 8.  Failed SBT earlier 2/2 mental status  - SBT trial while running precedex; keep VANESSA = 0    CARDIOVASCULAR: No IVF. Trops are negative. Keep MAP more than 65mmhg. Fluid balance negative by 1L.    GI: OGT feeds to be restrated after SBT. Bowel regimen. GI prophylaxis.   - Monitor Bowel Movement; started on Docalax    RENAL: Follow up lytes. Correct as needed. Patrick catheter. Kidney function back to baseline.  - Urine output decreased: 20mL/hour    INFECTIOUS DISEASE: Leukocytosis resolved. UA negative. Cultures negative. COVID PCR positive.   -Stop Rocephin and Doxy on 9/13 for total of 5 days for LLL pneumonia.     HEMATOLOGICAL: DVT prophylaxis: Lovenox SQ.     ENDOCRINE: Follow up FS. Insulin protocol if needed. Continue home meds.   - Check TSH    MUSCULOSKELETAL: Bed in chair position.  - PT/OT Recommendations    DISPO: Critically ill and needs ICU level of care.

## 2022-09-12 NOTE — PROGRESS NOTE ADULT - SUBJECTIVE AND OBJECTIVE BOX
HPI:  88yo F w/ pmhx of HTN, HLD, hypothyroidism, recent left femur fracture s/p surgery 1mo in [facility] presented to ED from [facility] with hypoxia and hypotension. Patient was saturating 50% on room air and improved only minimally on NRB in the ED. History was not able to be obtained by the patient as she was unresponsive and she was intubated for airway protection. Collateral HPI obtained by daughter Becky who said she was sent to the NH for rehab for her hip fracture. She denies any history of dementia. Baseline mental status is AAOx3 and prior to hip fracture patient was living by herself.     Of note patient was being treated in NH for R sided pneumonia (CXR 9/6: R perihilar infiltrate) and was receiving levaquin.     ED course:  vitals: /84, HR 73, hypothermic T 93s, O2 100% on BVM  labs: WBC 14.96, trop 0.04, , ABG pH 7.09, PCO2 93, PO2 51  imaging:   - CXR: wet read: wnl   - CT angio chest PE: no PE, lungs wnl   - CT head: No acute intracranial pathology. No evidence of midline shift, mass effect or intracranial hemorrhage.  given: vanc, cefepime, LR 1800ml  (07 Sep 2022 15:40)      INTERVAL HISTORY:    PAST MEDICAL & SURGICAL HISTORY  HTN (hypertension)    High cholesterol    Hypothyroid    History of colon resection    H/O: hysterectomy        ALLERGIES:  penicillin (Unknown)      MEDICATIONS:  MEDICATIONS  (STANDING):  atorvastatin 20 milliGRAM(s) Oral at bedtime  bisacodyl 5 milliGRAM(s) Oral at bedtime  cefTRIAXone   IVPB 1000 milliGRAM(s) IV Intermittent every 24 hours  chlorhexidine 0.12% Liquid 15 milliLiter(s) Oral Mucosa two times a day  chlorhexidine 2% Cloths 1 Application(s) Topical <User Schedule>  dexMEDEtomidine Infusion 0.2 MICROgram(s)/kG/Hr (3 mL/Hr) IV Continuous <Continuous>  doxycycline IVPB 100 milliGRAM(s) IV Intermittent every 12 hours  enoxaparin Injectable 40 milliGRAM(s) SubCutaneous every 24 hours  levothyroxine 75 MICROGram(s) Oral daily  mupirocin 2% Ointment 1 Application(s) Topical two times a day  polyethylene glycol 3350 17 Gram(s) Oral two times a day  senna 2 Tablet(s) Oral at bedtime    MEDICATIONS  (PRN):  acetaminophen     Tablet .. 650 milliGRAM(s) Oral every 6 hours PRN Temp greater or equal to 38C (100.4F), Mild Pain (1 - 3)      HOME MEDICATIONS:  Home Medications:  ascorbic acid 500 mg oral tablet: 1 tab(s) orally 2 times a day (07 Sep 2022 15:52)  docusate sodium 100 mg oral tablet: 1 tab(s) orally 2 times a day (07 Sep 2022 15:52)  lactulose 10 g oral powder for reconstitution: 30 milliliter(s) orally 2 times a day, As Needed for constipation (07 Sep 2022 15:52)  Lipitor 20 mg oral tablet: 1 tab(s) orally once a day (07 Sep 2022 15:52)  Melatonin 3 mg oral tablet: 1 tab(s) orally once a day (at bedtime) (07 Sep 2022 15:52)  Multiple Vitamins oral tablet: 1 tab(s) orally once a day (07 Sep 2022 15:52)  Synthroid 75 mcg (0.075 mg) oral tablet: 1 tab(s) orally once a day (07 Sep 2022 15:52)        OBJECTIVE:  ICU Vital Signs Last 24 Hrs  T(C): 36.8 (12 Sep 2022 12:00), Max: 36.8 (12 Sep 2022 12:00)  T(F): 98.3 (12 Sep 2022 12:00), Max: 98.3 (12 Sep 2022 12:00)  HR: 85 (12 Sep 2022 12:00) (69 - 101)  BP: 118/56 (12 Sep 2022 12:00) (95/54 - 184/87)  BP(mean): 81 (12 Sep 2022 12:00) (67 - 129)  ABP: --  ABP(mean): --  RR: 48 (12 Sep 2022 12:00) (14 - 50)  SpO2: 98% (12 Sep 2022 12:00) (97% - 100%)    O2 Parameters below as of 12 Sep 2022 12:00  Patient On (Oxygen Delivery Method): ventilator    O2 Concentration (%): 35      Mode: AC/ CMV (Assist Control/ Continuous Mandatory Ventilation)  RR (machine): 14  TV (machine): 400  FiO2: 35  PEEP: 5  ITime: 1  MAP: 9  PIP: 25      11 Sep 2022 07:01  -  12 Sep 2022 07:00  --------------------------------------------------------  IN: 1802 mL / OUT: 940 mL / NET: 862 mL    12 Sep 2022 07:01  -  12 Sep 2022 12:20  --------------------------------------------------------  IN: 55 mL / OUT: 140 mL / NET: -85 mL      PHYSICAL EXAM:  NEURO: patient is awake, alert and appears restless on the ventilator  GEN: Not in acute distress  NECK: no thyroid enlargement, no JVD  LUNGS: Diminished but otherwise clear to auscultation bilaterally   CARDIOVASCULAR: S1/S2 present, RRR , no murmurs or rubs, no carotid bruits,  + PP bilaterally  ABD: Soft, non-tender, non-distended, +BS  EXT: No KRISTOPHER  SKIN: Intact  ACCESS Site:    LABS:                        11.4   10.58 )-----------( 245      ( 12 Sep 2022 04:41 )             35.3     09-12    136  |  95<L>  |  41<H>  ----------------------------<  169<H>  3.5   |  29  |  0.8    Ca    8.6      12 Sep 2022 04:41  Mg     2.0     09-12    TPro  5.6<L>  /  Alb  3.4<L>  /  TBili  0.3  /  DBili  x   /  AST  17  /  ALT  13  /  AlkPhos  100  09-12      ECG:    TELEMETRY EVENTS:       HPI:  88yo F w/ pmhx of HTN, HLD, hypothyroidism, recent left femur fracture s/p surgery 1mo in [facility] presented to ED from [facility] with hypoxia and hypotension. Patient was saturating 50% on room air and improved only minimally on NRB in the ED. History was not able to be obtained by the patient as she was unresponsive and she was intubated for airway protection. Collateral HPI obtained by daughter Becky who said she was sent to the NH for rehab for her hip fracture. She denies any history of dementia. Baseline mental status is AAOx3 and prior to hip fracture patient was living by herself.     Of note patient was being treated in NH for R sided pneumonia (CXR 9/6: R perihilar infiltrate) and was receiving levaquin.     ED course:  vitals: /84, HR 73, hypothermic T 93s, O2 100% on BVM  labs: WBC 14.96, trop 0.04, , ABG pH 7.09, PCO2 93, PO2 51  imaging:   - CXR: wet read: wnl   - CT angio chest PE: no PE, lungs wnl   - CT head: No acute intracranial pathology. No evidence of midline shift, mass effect or intracranial hemorrhage.  given: vanc, cefepime, LR 1800ml  (07 Sep 2022 15:40)      INTERVAL HISTORY:    net negative 1.1L  overnight self-extubated and was reintubated    PAST MEDICAL & SURGICAL HISTORY  HTN (hypertension)    High cholesterol    Hypothyroid    History of colon resection    H/O: hysterectomy        ALLERGIES:  penicillin (Unknown)      MEDICATIONS:  MEDICATIONS  (STANDING):  atorvastatin 20 milliGRAM(s) Oral at bedtime  bisacodyl 5 milliGRAM(s) Oral at bedtime  cefTRIAXone   IVPB 1000 milliGRAM(s) IV Intermittent every 24 hours  chlorhexidine 0.12% Liquid 15 milliLiter(s) Oral Mucosa two times a day  chlorhexidine 2% Cloths 1 Application(s) Topical <User Schedule>  dexMEDEtomidine Infusion 0.2 MICROgram(s)/kG/Hr (3 mL/Hr) IV Continuous <Continuous>  doxycycline IVPB 100 milliGRAM(s) IV Intermittent every 12 hours  enoxaparin Injectable 40 milliGRAM(s) SubCutaneous every 24 hours  levothyroxine 75 MICROGram(s) Oral daily  mupirocin 2% Ointment 1 Application(s) Topical two times a day  polyethylene glycol 3350 17 Gram(s) Oral two times a day  senna 2 Tablet(s) Oral at bedtime    MEDICATIONS  (PRN):  acetaminophen     Tablet .. 650 milliGRAM(s) Oral every 6 hours PRN Temp greater or equal to 38C (100.4F), Mild Pain (1 - 3)      HOME MEDICATIONS:  Home Medications:  ascorbic acid 500 mg oral tablet: 1 tab(s) orally 2 times a day (07 Sep 2022 15:52)  docusate sodium 100 mg oral tablet: 1 tab(s) orally 2 times a day (07 Sep 2022 15:52)  lactulose 10 g oral powder for reconstitution: 30 milliliter(s) orally 2 times a day, As Needed for constipation (07 Sep 2022 15:52)  Lipitor 20 mg oral tablet: 1 tab(s) orally once a day (07 Sep 2022 15:52)  Melatonin 3 mg oral tablet: 1 tab(s) orally once a day (at bedtime) (07 Sep 2022 15:52)  Multiple Vitamins oral tablet: 1 tab(s) orally once a day (07 Sep 2022 15:52)  Synthroid 75 mcg (0.075 mg) oral tablet: 1 tab(s) orally once a day (07 Sep 2022 15:52)        OBJECTIVE:  ICU Vital Signs Last 24 Hrs  T(C): 36.8 (12 Sep 2022 12:00), Max: 36.8 (12 Sep 2022 12:00)  T(F): 98.3 (12 Sep 2022 12:00), Max: 98.3 (12 Sep 2022 12:00)  HR: 85 (12 Sep 2022 12:00) (69 - 101)  BP: 118/56 (12 Sep 2022 12:00) (95/54 - 184/87)  BP(mean): 81 (12 Sep 2022 12:00) (67 - 129)  ABP: --  ABP(mean): --  RR: 48 (12 Sep 2022 12:00) (14 - 50)  SpO2: 98% (12 Sep 2022 12:00) (97% - 100%)    O2 Parameters below as of 12 Sep 2022 12:00  Patient On (Oxygen Delivery Method): ventilator    O2 Concentration (%): 35      Mode: AC/ CMV (Assist Control/ Continuous Mandatory Ventilation)  RR (machine): 14  TV (machine): 400  FiO2: 35  PEEP: 5  ITime: 1  MAP: 9  PIP: 25      11 Sep 2022 07:01  -  12 Sep 2022 07:00  --------------------------------------------------------  IN: 1802 mL / OUT: 940 mL / NET: 862 mL    12 Sep 2022 07:01  -  12 Sep 2022 12:20  --------------------------------------------------------  IN: 55 mL / OUT: 140 mL / NET: -85 mL      PHYSICAL EXAM:  NEURO: patient is awake, alert and appears restless on the ventilator  GEN: Not in acute distress  NECK: no thyroid enlargement, no JVD  LUNGS: Diminished but otherwise clear to auscultation bilaterally   CARDIOVASCULAR: S1/S2 present, RRR , no murmurs or rubs, no carotid bruits,  + PP bilaterally  ABD: Soft, non-tender, non-distended, +BS  EXT: No KRISTOPHER  SKIN: Intact  ACCESS Site:    LABS:                        11.4   10.58 )-----------( 245      ( 12 Sep 2022 04:41 )             35.3     09-12    136  |  95<L>  |  41<H>  ----------------------------<  169<H>  3.5   |  29  |  0.8    Ca    8.6      12 Sep 2022 04:41  Mg     2.0     09-12    TPro  5.6<L>  /  Alb  3.4<L>  /  TBili  0.3  /  DBili  x   /  AST  17  /  ALT  13  /  AlkPhos  100  09-12      ECG:    TELEMETRY EVENTS:

## 2022-09-12 NOTE — PATIENT PROFILE ADULT - DOMESTIC TRAVEL HIGH RISK QUESTION
Daily Note     Today's date: 2022  Patient name: Alice Mclean  : 1963  MRN: [id number]  Referring provider: Kurt James MD  Dx:   Encounter Diagnosis     ICD-10-CM    1  Calcific tendonitis of left shoulder  M75 32    2  Impingement syndrome of left shoulder  M75 42        Start Time: 1054  Stop Time: 1134  Total time in clinic (min): 40 minutes       Subjective: Patient states, "It comes and goes "  Patient recalls two nights ago left shoulder pain woke her up - reports if felt like a deep ache that radiated pain down her left arm  Patient reports left shoulder ROM is pretty good with the exception of reaching behind her back  Objective: See treatment diary below  Assessment: Therapeutic exercise program is tolerated well therefore HEP is progressed to include theraband exercises  Left shoulder ROM visibly improved with the manual stretching  Plan: Continue treatment as per PT plan of care         Precautions: OA      Manuals             laser 5' JLW            prom JLW            jnt mobs                          Neuro Re-Ed                                                                                                        Ther Ex             tb rows green  20            tb lpd green  20            tb ir/er green  20 ea            strap IR stretch 20"x4            Sleeper strech             scap punches 2#  5"x20            scap retraction with cane ext 10"x10            ube 6'                         Ther Activity                                       Gait Training                                       Modalities
No

## 2022-09-13 NOTE — PROGRESS NOTE ADULT - ASSESSMENT
IMPRESSION:    Acute hypercapnic respiratory failure  Acute hypoxemic respiratory failure  THERESA with decreased urine output - resolved   H/o Hypothyroidism  Altered mental status   SIRS  LLL opacity/pneumonia    PLAN:    CNS: CT head no acute changes. Daily spontaneous awakening trial. Sedation held yesterday. Routine EEG no signs of seizure activity. Following commands. Daily SAT    HEENT: Oral care; ETT care    PULMONARY: CTA chest no PE. CXR showing left basilar opacity/effusion. HOB @ 45 degrees.Low minute ventilation on SBT with PS of 8.  Failed SBT earlier 2/2 mental status, will attempt again with increasing doses of precedex  - SBT trial while running precedex; keep RASS = 0    CARDIOVASCULAR: No IVF. Trops are negative. Keep MAP more than 65mmhg. Fluid balance negative by 1L.    GI: OGT feeds to be restrated after SBT. Bowel regimen. GI prophylaxis.   - Monitor Bowel Movement; started on Docalax    RENAL: Follow up lytes. Correct as needed. Patrick catheter. Kidney function back to baseline.  Target net even  - Urine output decreased: 20mL/hour    INFECTIOUS DISEASE: Leukocytosis resolved. UA negative. Cultures negative. COVID PCR positive.   -Stop Rocephin and Doxy today for total of 5 days for LLL pneumonia.     HEMATOLOGICAL: DVT prophylaxis: Lovenox SQ.     ENDOCRINE: Follow up FS. Insulin protocol if needed. Continue home meds.     MUSCULOSKELETAL: Bed in chair position.  PT/OT    DISPO: Critically ill and needs ICU level of care.

## 2022-09-13 NOTE — PROGRESS NOTE ADULT - ASSESSMENT
IMPRESSION:    Acute hypercapnic respiratory failure  Acute hypoxemic respiratory failure  THERESA with decreased urine output - resolved   H/o Hypothyroidism  Altered mental status   SIRS  LLL opacity/pneumonia    PLAN:    CNS: CT head no acute changes. Daily spontaneous awakening trial. Routine EEG no signs of seizure activity. Following commands.   - SAT/SBT Trials Daily      HEENT: Oral care; ETT care    PULMONARY: CTA chest no PE. CXR showing left basilar opacity/effusion. HOB @ 45 degrees.Low minute ventilation on SBT with PS of 8.   - Patient failed trial today due to inadequate respiratory effort and restlessness  - Attempt extubation tomorrow    CARDIOVASCULAR: No IVF. Trops are negative. Keep MAP more than 65mmhg. Fluid balance negative by 1L.    GI: OGT feeds to be restrated after SBT. Bowel regimen. GI prophylaxis.   - Monitor Bowel Movement - minimal BM this morning  - KUB showed stool; no perforation; lactulose added    RENAL: Follow up lytes. Correct as needed. Patrick catheter. Kidney function back to baseline.  - Monitor Urine Output  - Lasix 20 QD    INFECTIOUS DISEASE: Leukocytosis resolved. UA negative. Cultures negative. COVID PCR positive.   - Completed Abx    HEMATOLOGICAL: DVT prophylaxis: Lovenox SQ.     ENDOCRINE: Follow up FS. Insulin protocol if needed. Continue home meds.       MUSCULOSKELETAL: Bed in chair position.  - PT/OT Recommendations    DISPO: Critically ill and needs ICU level of care.

## 2022-09-13 NOTE — PROGRESS NOTE ADULT - SUBJECTIVE AND OBJECTIVE BOX
Patient is a 89y old  Female who presents with a chief complaint of AMS, respiratory failure (12 Sep 2022 12:14)        Over Night Events:    no acute overnight events  continue to fail SBT despite increased precedex.  Daughter endorses that she is anxious baseline    ROS:       Unable to assess ROS: patient intubated.        PHYSICAL EXAM    ICU Vital Signs Last 24 Hrs  T(C): 36.7 (13 Sep 2022 12:00), Max: 36.7 (13 Sep 2022 06:00)  T(F): 98.1 (13 Sep 2022 12:00), Max: 98.1 (13 Sep 2022 12:00)  HR: 82 (13 Sep 2022 12:00) (72 - 100)  BP: 123/58 (13 Sep 2022 12:00) (101/58 - 160/85)  BP(mean): 83 (13 Sep 2022 12:00) (56 - 116)  ABP: --  ABP(mean): --  RR: 17 (13 Sep 2022 12:00) (14 - 44)  SpO2: 98% (13 Sep 2022 12:00) (95% - 100%)    O2 Parameters below as of 13 Sep 2022 12:00  Patient On (Oxygen Delivery Method): ventilator          NEURO: patient is awake, alert and appears restless on the ventilator  GEN: Not in acute distress  NECK: no thyroid enlargement, no JVD  LUNGS: Diminished but otherwise clear to auscultation bilaterally   CARDIOVASCULAR: S1/S2 present, RRR  ABD: Soft, non-tender, non-distended, +BS  EXT: No KRISTOPHER  SKIN: Intact      09-12-22 @ 07:01  -  09-13-22 @ 07:00  --------------------------------------------------------  IN:    Dexmedetomidine: 19.2 mL    Enteral Tube Flush: 400 mL    IV PiggyBack: 250 mL    Peptamen A.F.: 1200 mL  Total IN: 1869.2 mL    OUT:    Indwelling Catheter - Urethral (mL): 655 mL  Total OUT: 655 mL    Total NET: 1214.2 mL      09-13-22 @ 07:01  -  09-13-22 @ 15:59  --------------------------------------------------------  IN:    Dexmedetomidine: 17.1 mL    Enteral Tube Flush: 50 mL    Peptamen A.F.: 360 mL  Total IN: 427.1 mL    OUT:    Indwelling Catheter - Urethral (mL): 235 mL  Total OUT: 235 mL    Total NET: 192.1 mL          LABS:                            11.6   13.67 )-----------( 274      ( 13 Sep 2022 05:00 )             35.6                                               09-13    135  |  93<L>  |  49<H>  ----------------------------<  132<H>  4.1   |  31  |  0.8    Ca    8.9      13 Sep 2022 05:00  Mg     2.0     09-13    TPro  5.7<L>  /  Alb  3.4<L>  /  TBili  0.4  /  DBili  x   /  AST  23  /  ALT  21  /  AlkPhos  106  09-13                                                                                           LIVER FUNCTIONS - ( 13 Sep 2022 05:00 )  Alb: 3.4 g/dL / Pro: 5.7 g/dL / ALK PHOS: 106 U/L / ALT: 21 U/L / AST: 23 U/L / GGT: x                                                                                               Mode: AC/ CMV (Assist Control/ Continuous Mandatory Ventilation)  RR (machine): 14  TV (machine): 400  FiO2: 35  PEEP: 5  MAP: 9  PIP: 24                                      ABG - ( 13 Sep 2022 03:53 )  pH, Arterial: 7.54  pH, Blood: x     /  pCO2: 39    /  pO2: 65    / HCO3: 33    / Base Excess: 10.0  /  SaO2: 97.7                MEDICATIONS  (STANDING):  atorvastatin 20 milliGRAM(s) Oral at bedtime  bisacodyl 5 milliGRAM(s) Oral at bedtime  cefTRIAXone   IVPB 1000 milliGRAM(s) IV Intermittent every 24 hours  chlorhexidine 0.12% Liquid 15 milliLiter(s) Oral Mucosa two times a day  chlorhexidine 2% Cloths 1 Application(s) Topical <User Schedule>  dexMEDEtomidine Infusion 0.2 MICROgram(s)/kG/Hr (3 mL/Hr) IV Continuous <Continuous>  doxycycline IVPB 100 milliGRAM(s) IV Intermittent every 12 hours  enoxaparin Injectable 40 milliGRAM(s) SubCutaneous every 24 hours  lactulose Syrup 10 Gram(s) Oral two times a day  levothyroxine 75 MICROGram(s) Oral daily  mupirocin 2% Ointment 1 Application(s) Topical two times a day  norepinephrine Infusion 0.05 MICROgram(s)/kG/Min (4.57 mL/Hr) IV Continuous <Continuous>  polyethylene glycol 3350 17 Gram(s) Oral two times a day  senna 2 Tablet(s) Oral at bedtime    MEDICATIONS  (PRN):  acetaminophen     Tablet .. 650 milliGRAM(s) Oral every 6 hours PRN Temp greater or equal to 38C (100.4F), Mild Pain (1 - 3)      New X-rays reviewed:       ECHO reviewed    CXR interpreted by me:

## 2022-09-13 NOTE — PROGRESS NOTE ADULT - SUBJECTIVE AND OBJECTIVE BOX
HPI:  89yo F w/ pmhx of HTN, HLD, hypothyroidism, recent left femur fracture s/p surgery 1mo in [facility] presented to ED from [facility] with hypoxia and hypotension. Patient was saturating 50% on room air and improved only minimally on NRB in the ED. History was not able to be obtained by the patient as she was unresponsive and she was intubated for airway protection. Collateral HPI obtained by daughter Becky who said she was sent to the NH for rehab for her hip fracture. She denies any history of dementia. Baseline mental status is AAOx3 and prior to hip fracture patient was living by herself.     Of note patient was being treated in NH for R sided pneumonia (CXR 9/6: R perihilar infiltrate) and was receiving levaquin.     INTERVAL HISTORY:    - Patient failed SBT today; Was trialed on Precedex without success    PAST MEDICAL & SURGICAL HISTORY  HTN (hypertension)    High cholesterol    Hypothyroid    History of colon resection    H/O: hysterectomy        ALLERGIES:  penicillin (Unknown)      MEDICATIONS:  MEDICATIONS  (STANDING):  atorvastatin 20 milliGRAM(s) Oral at bedtime  bisacodyl 5 milliGRAM(s) Oral at bedtime  cefTRIAXone   IVPB 1000 milliGRAM(s) IV Intermittent every 24 hours  chlorhexidine 0.12% Liquid 15 milliLiter(s) Oral Mucosa two times a day  chlorhexidine 2% Cloths 1 Application(s) Topical <User Schedule>  dexMEDEtomidine Infusion 0.2 MICROgram(s)/kG/Hr (3 mL/Hr) IV Continuous <Continuous>  doxycycline IVPB 100 milliGRAM(s) IV Intermittent every 12 hours  enoxaparin Injectable 40 milliGRAM(s) SubCutaneous every 24 hours  lactulose Syrup 10 Gram(s) Oral two times a day  levothyroxine 75 MICROGram(s) Oral daily  mupirocin 2% Ointment 1 Application(s) Topical two times a day  norepinephrine Infusion 0.05 MICROgram(s)/kG/Min (4.57 mL/Hr) IV Continuous <Continuous>  polyethylene glycol 3350 17 Gram(s) Oral two times a day  senna 2 Tablet(s) Oral at bedtime    MEDICATIONS  (PRN):  acetaminophen     Tablet .. 650 milliGRAM(s) Oral every 6 hours PRN Temp greater or equal to 38C (100.4F), Mild Pain (1 - 3)      HOME MEDICATIONS:  Home Medications:  ascorbic acid 500 mg oral tablet: 1 tab(s) orally 2 times a day (07 Sep 2022 15:52)  docusate sodium 100 mg oral tablet: 1 tab(s) orally 2 times a day (07 Sep 2022 15:52)  lactulose 10 g oral powder for reconstitution: 30 milliliter(s) orally 2 times a day, As Needed for constipation (07 Sep 2022 15:52)  Lipitor 20 mg oral tablet: 1 tab(s) orally once a day (07 Sep 2022 15:52)  Melatonin 3 mg oral tablet: 1 tab(s) orally once a day (at bedtime) (07 Sep 2022 15:52)  Multiple Vitamins oral tablet: 1 tab(s) orally once a day (07 Sep 2022 15:52)  Synthroid 75 mcg (0.075 mg) oral tablet: 1 tab(s) orally once a day (07 Sep 2022 15:52)        OBJECTIVE:  ICU Vital Signs Last 24 Hrs  T(C): 36.8 (13 Sep 2022 17:00), Max: 36.8 (13 Sep 2022 17:00)  T(F): 98.2 (13 Sep 2022 17:00), Max: 98.2 (13 Sep 2022 17:00)  HR: 75 (13 Sep 2022 17:00) (72 - 97)  BP: 106/58 (13 Sep 2022 17:00) (79/42 - 149/70)  BP(mean): 63 (13 Sep 2022 17:00) (56 - 101)  ABP: --  ABP(mean): --  RR: 23 (13 Sep 2022 17:00) (14 - 44)  SpO2: 95% (13 Sep 2022 17:00) (94% - 100%)    O2 Parameters below as of 13 Sep 2022 12:00  Patient On (Oxygen Delivery Method): ventilator          Mode: AC/ CMV (Assist Control/ Continuous Mandatory Ventilation)  RR (machine): 14  TV (machine): 400  FiO2: 35  PEEP: 5  MAP: 9  PIP: 24    12 Sep 2022 07:01  -  13 Sep 2022 07:00  --------------------------------------------------------  IN: 1869.2 mL / OUT: 655 mL / NET: 1214.2 mL    13 Sep 2022 07:01  -  13 Sep 2022 17:58  --------------------------------------------------------  IN: 837.1 mL / OUT: 685 mL / NET: 152.1 mL          PHYSICAL EXAM:  NEURO: patient is intubated, arousable, alert and able to follow commands  GEN: Not in acute distress  NECK: no thyroid enlargement, no JVD  LUNGS: Clear to auscultation bilaterally   CARDIOVASCULAR: S1/S2 present, RRR , no murmurs or rubs, no carotid bruits,  + PP bilaterally  ABD: Soft, non-tender, non-distended, +BS  EXT: No KRISTOPHER  SKIN: Intact  ACCESS Site:    LABS:                        11.6   13.67 )-----------( 274      ( 13 Sep 2022 05:00 )             35.6     09-13    135  |  93<L>  |  49<H>  ----------------------------<  132<H>  4.1   |  31  |  0.8    Ca    8.9      13 Sep 2022 05:00  Mg     2.0     09-13    TPro  5.7<L>  /  Alb  3.4<L>  /  TBili  0.4  /  DBili  x   /  AST  23  /  ALT  21  /  AlkPhos  106  09-13          RADIOLOGY:    ECG:    TELEMETRY EVENTS:

## 2022-09-14 NOTE — PROGRESS NOTE ADULT - SUBJECTIVE AND OBJECTIVE BOX
NUTRITION SUPPORT TEAM  -  PROGRESS NOTE     Interval Events:  reconsult requested due to loose brown BM  pt remains vented, sedated  abd soft, ND    VITALS:  T(F): 98.2 (09-14 @ 13:00), Max: 98.2 (09-14 @ 13:00)  HR: 81 (09-14 @ 17:00) (62 - 108)  BP: 115/68 (09-14 @ 17:00) (112/54 - 153/71)  RR: 21 (09-14 @ 17:00) (19 - 34)  SpO2: 98% (09-14 @ 17:00) (85% - 100%)    Mode: CPAP with PS  FiO2: 35  PEEP: 5  PS: 8  ABG - ( 14 Sep 2022 04:07 )  pH, Arterial: 7.52  pH, Blood: x     /  pCO2: 38    /  pO2: 132   / HCO3: 31    / Base Excess: 7.6   /  SaO2: 100.0       HEIGHT/WEIGHT/BMI:   Height (cm): 152.4 (09-08)  Weight (kg): 48.7 (09-08)  BMI (kg/m2): 21 (09-08)    I/Os:   09-13-22 @ 07:01  -  09-14-22 @ 07:00  --------------------------------------------------------  IN:    Dexmedetomidine: 90 mL    Enteral Tube Flush: 370 mL    IV PiggyBack: 150 mL    Norepinephrine: 64.4 mL    Peptamen A.F.: 1440 mL  Total IN: 2114.4 mL  OUT:    Indwelling Catheter - Urethral (mL): 1125 mL  Total OUT: 1125 mL  Total NET: 989.4 mL    STANDING MEDICATIONS:   atorvastatin 20 milliGRAM(s) Oral at bedtime  bisacodyl 5 milliGRAM(s) Oral at bedtime  cefTRIAXone   IVPB 1000 milliGRAM(s) IV Intermittent every 24 hours  chlorhexidine 0.12% Liquid 15 milliLiter(s) Oral Mucosa two times a day  chlorhexidine 2% Cloths 1 Application(s) Topical <User Schedule>  dexMEDEtomidine Infusion 0.2 MICROgram(s)/kG/Hr IV Continuous <Continuous>  doxycycline IVPB 100 milliGRAM(s) IV Intermittent every 12 hours  enoxaparin Injectable 40 milliGRAM(s) SubCutaneous every 24 hours  furosemide   Injectable 20 milliGRAM(s) IV Push two times a day  insulin lispro (ADMELOG) corrective regimen sliding scale   SubCutaneous four times a day with meals  lactulose Syrup 10 Gram(s) Oral two times a day  levothyroxine 75 MICROGram(s) Oral daily  mupirocin 2% Ointment 1 Application(s) Topical two times a day  norepinephrine Infusion 0.05 MICROgram(s)/kG/Min IV Continuous <Continuous>  polyethylene glycol 3350 17 Gram(s) Oral two times a day  senna 2 Tablet(s) Oral at bedtime      LABS:                         10.1   13.85 )-----------( 260      ( 14 Sep 2022 05:00 )             30.5     133<L>  |  93<L>  |  55<H>  ----------------------------<  224<H>          (09-14-22 @ 05:00)  3.9   |  27  |  0.9    Ca    8.6          (09-14-22 @ 05:00)  Mg     1.8         (09-14-22 @ 05:00)    TPro  5.0<L>  /  Alb  3.1<L>  /  TBili  0.3  /  DBili  x   /  AST  21  /  ALT  21  /  AlkPhos  95       09-14-22 @ 05:00    Blood Glucose (Past 24 hours):  250 mg/dL (09-14 @ 12:12)  190 mg/dL (09-13 @ 18:04)    DIET:   Diet, NPO with Tube Feed:   Tube Feeding Modality: Orogastric  Peptamen A.F. Formula  Total Volume for 24 Hours (mL): 1440  Bolus  Total Volume of Bolus (mL):  360  Tube Feed Frequency: Every 6 hours   Tube Feed Start Time: 00:00  Bolus Feed Rate (mL per Hour): 360   Bolus Feed Duration (in Hours): 24 (09-08-22 @ 23:55) [Active]

## 2022-09-14 NOTE — PROGRESS NOTE ADULT - SUBJECTIVE AND OBJECTIVE BOX
HPI:   Patient is a 89yo F w/ pmhx of HTN, HLD, hypothyroidism, recent left femur fracture s/p surgery 1mo in [facility] presented to ED from [facility] with hypoxia and hypotension. Patient was saturating 50% on room air and improved only minimally on NRB in the ED. History was not able to be obtained by the patient as she was unresponsive and she was intubated for airway protection. Collateral HPI obtained by daughter Becky who said she was sent to the NH for rehab for her hip fracture. She denies any history of dementia. Baseline mental status is AAOx3 and prior to hip fracture patient was living by herself.   Of note patient was being treated in NH for R sided pneumonia (CXR 9/6: R perihilar infiltrate) and was receiving  levaquin.   ED course:  vitals: /84, HR 73, hypothermic T 93s, O2 100% on BVM  labs: WBC 14.96, trop 0.04, , ABG pH 7.09, PCO2 93, PO2 51  imaging:   - CXR: wet read: wnl   - CT angio chest PE: no PE, lungs wnl   - CT head: No acute intracranial pathology. No evidence of midline shift, mass effect or intracranial hemorrhage.  given: vanc, cefepime, LR 1800ml  (07 Sep 2022 15:40)          PAST MEDICAL & SURGICAL HISTORY:  HTN (hypertension)  High cholesterol  Hypothyroid  History of colon resection  H/O: hysterectomy      REVIEW OF SYSTEMS: Pt unable to offer, due to current state     MEDICATIONS  (STANDING):  atorvastatin 20 milliGRAM(s) Oral at bedtime  bisacodyl 5 milliGRAM(s) Oral at bedtime  cefTRIAXone   IVPB 1000 milliGRAM(s) IV Intermittent every 24 hours  chlorhexidine 0.12% Liquid 15 milliLiter(s) Oral Mucosa two times a day  chlorhexidine 2% Cloths 1 Application(s) Topical <User Schedule>  dexMEDEtomidine Infusion 0.2 MICROgram(s)/kG/Hr (2.44 mL/Hr) IV Continuous <Continuous>  dextrose 5%. 1000 milliLiter(s) (100 mL/Hr) IV Continuous <Continuous>  dextrose 5%. 1000 milliLiter(s) (50 mL/Hr) IV Continuous <Continuous>  dextrose 50% Injectable 25 Gram(s) IV Push once  dextrose 50% Injectable 12.5 Gram(s) IV Push once  dextrose 50% Injectable 25 Gram(s) IV Push once  doxycycline IVPB 100 milliGRAM(s) IV Intermittent every 12 hours  enoxaparin Injectable 40 milliGRAM(s) SubCutaneous every 24 hours  furosemide   Injectable 20 milliGRAM(s) IV Push two times a day  glucagon  Injectable 1 milliGRAM(s) IntraMuscular once  insulin lispro (ADMELOG) corrective regimen sliding scale   SubCutaneous four times a day with meals  lactulose Syrup 10 Gram(s) Oral two times a day  levothyroxine 75 MICROGram(s) Oral daily  mupirocin 2% Ointment 1 Application(s) Topical two times a day  norepinephrine Infusion 0.05 MICROgram(s)/kG/Min (4.57 mL/Hr) IV Continuous <Continuous>  polyethylene glycol 3350 17 Gram(s) Oral two times a day  senna 2 Tablet(s) Oral at bedtime    MEDICATIONS  (PRN):  acetaminophen     Tablet .. 650 milliGRAM(s) Oral every 6 hours PRN Temp greater or equal to 38C (100.4F), Mild Pain (1 - 3)  dextrose Oral Gel 15 Gram(s) Oral once PRN Blood Glucose LESS THAN 70 milliGRAM(s)/deciliter      Allergies  penicillin (Unknown)  Intolerances    SOCIAL HISTORY:  Lives in [facility]    FAMILY HISTORY:    PHYSICAL EXAM:  Vital Signs Last 24 Hrs  T(C): 36.8 (14 Sep 2022 13:00), Max: 36.8 (13 Sep 2022 17:00)  T(F): 98.2 (14 Sep 2022 13:00), Max: 98.2 (13 Sep 2022 17:00)  HR: 89 (14 Sep 2022 15:00) (55 - 108)  BP: 118/60 (14 Sep 2022 15:00) (88/51 - 153/71)  BP(mean): 86 (14 Sep 2022 15:00) (63 - 98)  RR: 26 (14 Sep 2022 15:00) (14 - 42)  SpO2: 96% (14 Sep 2022 15:00) (85% - 100%)    Parameters below as of 14 Sep 2022 12:00  Patient On (Oxygen Delivery Method): ventilator    O2 Concentration (%): 40     General : NAD , vented   HEENT:  NC/AT, PERRL, EOMI, sclera clear, mucosa moist, throat clear, trachea midline, neck supple  Cardiovascular: RRR   Respiratory: mechanical ventilator   Gastrointestinal soft NT/ND (+)BS, Npo enteral feeding   Neurology:   Non - verbal   Psych: calm  Musculoskeletal:  limited   Skin:  moist w/ good turgor    LABS/ CULTURES/ RADIOLOGY:                        10.1   13.85 )-----------( 260      ( 14 Sep 2022 05:00 )             30.5       133  |  93  |  55  ----------------------------<  224      [09-14-22 @ 05:00]  3.9   |  27  |  0.9        Ca     8.6     [09-14-22 @ 05:00]      Mg     1.8     [09-14-22 @ 05:00]    TPro  5.0  /  Alb  3.1  /  TBili  0.3  /  DBili  x   /  AST  21  /  ALT  21  /  AlkPhos  95  [09-14-22 @ 05:00]

## 2022-09-14 NOTE — PROGRESS NOTE ADULT - ASSESSMENT
Assessment:  Patient received in bed, vented on Precedex  Covid +  Nilesh score14  vent. Currently  npo on enteral feeding                         Mostly bedbound , incontinence to stool and urine                        High risk for pressure injury development or progression   Skin assessed- B/L buttock and sacrum intact dark red liner striation, pigmentation                        B/L heel dry and intact, No pressure injury noted at time of assessment          Wound #1  type and location :   Size:   Tissue Description :   [ ] Necrotic   [ ] Slough   [ ] Infected   [ ] Granulation (firm, beefy red tissue)   [ ] Hypergranulation (soft, gelatinous)  [ ] Poor-Quality Granulation (pale, grey/brown/red granulation tissue)   [ ] Epithelium (pink/mauve at wound edges)  [ ] Macerated  [ ] Other: _______  Wound Exudate :   Wound Edge:   [ ] Epithelisation [ ] Maceration [ ] Dehydration [ ] Undermining (use clock position) [ ] Rolled Edges [ ] Other: _____  Periwound Condition (area that extends 4cm from the edge of the wound):   [ ] Maceration [ ] Excoriation [ ] Dry skin [ ] Hyperkeratosis [ ] Callus [ ] Eczema [ ] Other: _______      Other Etiology:  [ ] Aterial  [ ] Venous   [ ] Surgical Incision  [ ] Other: ________    Plan:  Wound and skin recs   Continue skin barrier prn   Pressure  injury  preventive  measures  skin  and incontinence care   Assess wound and inform primary provider of any changes   Case discussed with primary Rn  Wound/ ostomy specialist  to f/u as needed     Offloading: [ ] Frequent position changes [ ] Devices/Equipment  Cleansing: [ ] Saline [ ] Soap/Water [ ] Other: ______  Topicals: [ ] Barrier Cream [ ] Antimicrobial [ ] Enzymatic Wound Debridement  Dressings: [ ] Dry, sterile [ ] Allevyn  Foam [ ] Absorbant Pads [ ] Collagenase    Other Recs.   -per primary team     Total time for bedside assessment , review of medical records  and  discussion of plan of care with primary team greater than 35 min

## 2022-09-14 NOTE — PROGRESS NOTE ADULT - SUBJECTIVE AND OBJECTIVE BOX
HPI:  89yo F w/ pmhx of HTN, HLD, hypothyroidism, recent left femur fracture s/p surgery 1mo in [facility] presented to ED from [facility] with hypoxia and hypotension. Patient was saturating 50% on room air and improved only minimally on NRB in the ED. History was not able to be obtained by the patient as she was unresponsive and she was intubated for airway protection. Collateral HPI obtained by daughter Becky who said she was sent to the NH for rehab for her hip fracture. She denies any history of dementia. Baseline mental status is AAOx3 and prior to hip fracture patient was living by herself.     Of note patient was being treated in NH for R sided pneumonia (CXR : R perihilar infiltrate) and was receiving levaquin.     INTERVAL HISTORY:    - Patient failed SBT due to tachypnea and agitation    PAST MEDICAL & SURGICAL HISTORY  HTN (hypertension)    High cholesterol    Hypothyroid    History of colon resection    H/O: hysterectomy        ALLERGIES:  penicillin (Unknown)      MEDICATIONS:  MEDICATIONS  (STANDING):  atorvastatin 20 milliGRAM(s) Oral at bedtime  bisacodyl 5 milliGRAM(s) Oral at bedtime  cefTRIAXone   IVPB 1000 milliGRAM(s) IV Intermittent every 24 hours  chlorhexidine 0.12% Liquid 15 milliLiter(s) Oral Mucosa two times a day  chlorhexidine 2% Cloths 1 Application(s) Topical <User Schedule>  dextrose 5%. 1000 milliLiter(s) (50 mL/Hr) IV Continuous <Continuous>  dextrose 5%. 1000 milliLiter(s) (100 mL/Hr) IV Continuous <Continuous>  dextrose 50% Injectable 25 Gram(s) IV Push once  dextrose 50% Injectable 25 Gram(s) IV Push once  dextrose 50% Injectable 12.5 Gram(s) IV Push once  doxycycline IVPB 100 milliGRAM(s) IV Intermittent every 12 hours  enoxaparin Injectable 40 milliGRAM(s) SubCutaneous every 24 hours  furosemide   Injectable 20 milliGRAM(s) IV Push two times a day  glucagon  Injectable 1 milliGRAM(s) IntraMuscular once  insulin lispro (ADMELOG) corrective regimen sliding scale   SubCutaneous four times a day with meals  lactulose Syrup 10 Gram(s) Oral two times a day  levothyroxine 75 MICROGram(s) Oral daily  mupirocin 2% Ointment 1 Application(s) Topical two times a day  norepinephrine Infusion 0.05 MICROgram(s)/kG/Min (4.57 mL/Hr) IV Continuous <Continuous>  polyethylene glycol 3350 17 Gram(s) Oral two times a day  senna 2 Tablet(s) Oral at bedtime    MEDICATIONS  (PRN):  acetaminophen     Tablet .. 650 milliGRAM(s) Oral every 6 hours PRN Temp greater or equal to 38C (100.4F), Mild Pain (1 - 3)  dextrose Oral Gel 15 Gram(s) Oral once PRN Blood Glucose LESS THAN 70 milliGRAM(s)/deciliter      HOME MEDICATIONS:  Home Medications:  ascorbic acid 500 mg oral tablet: 1 tab(s) orally 2 times a day (07 Sep 2022 15:52)  docusate sodium 100 mg oral tablet: 1 tab(s) orally 2 times a day (07 Sep 2022 15:52)  lactulose 10 g oral powder for reconstitution: 30 milliliter(s) orally 2 times a day, As Needed for constipation (07 Sep 2022 15:52)  Lipitor 20 mg oral tablet: 1 tab(s) orally once a day (07 Sep 2022 15:52)  Melatonin 3 mg oral tablet: 1 tab(s) orally once a day (at bedtime) (07 Sep 2022 15:52)  Multiple Vitamins oral tablet: 1 tab(s) orally once a day (07 Sep 2022 15:52)  Synthroid 75 mcg (0.075 mg) oral tablet: 1 tab(s) orally once a day (07 Sep 2022 15:52)        OBJECTIVE:  ICU Vital Signs Last 24 Hrs  T(C): 36.3 (14 Sep 2022 08:00), Max: 36.8 (13 Sep 2022 17:00)  T(F): 97.3 (14 Sep 2022 08:00), Max: 98.2 (13 Sep 2022 17:00)  HR: 75 (14 Sep 2022 11:00) (55 - 88)  BP: 113/58 (14 Sep 2022 11:00) (79/42 - 145/67)  BP(mean): 83 (14 Sep 2022 11:00) (56 - 98)  ABP: --  ABP(mean): --  RR: 25 (14 Sep 2022 11:00) (14 - 42)  SpO2: 97% (14 Sep 2022 11:00) (94% - 100%)    O2 Parameters below as of 14 Sep 2022 08:00  Patient On (Oxygen Delivery Method): ventilator          Mode: AC/ CMV (Assist Control/ Continuous Mandatory Ventilation)  RR (machine): 14  TV (machine): 400  FiO2: 35  PEEP: 5  ITime: 1  MAP: 12  PIP: 22    13 Sep 2022 07:01  -  14 Sep 2022 07:00  --------------------------------------------------------  IN: 2114.4 mL / OUT: 1125 mL / NET: 989.4 mL    14 Sep 2022 07:01  -  14 Sep 2022 13:40  --------------------------------------------------------  IN: 2.3 mL / OUT: 120 mL / NET: -117.7 mL      Daily     Daily Weight in k.8 (14 Sep 2022 04:00)    PHYSICAL EXAM:  NEURO: patient is an intubated but arousable  GEN: Not in acute distress  NECK: no thyroid enlargement, no JVD  LUNGS: Clear to auscultation bilaterally   CARDIOVASCULAR: S1/S2 present, RRR , no murmurs or rubs, no carotid bruits,  + PP bilaterally  ABD: Soft, non-tender, non-distended, +BS  EXT: No KRISTOPHER  SKIN: Intact  ACCESS Site:    LABS:                        10.1   13.85 )-----------( 260      ( 14 Sep 2022 05:00 )             30.5     09-14    133<L>  |  93<L>  |  55<H>  ----------------------------<  224<H>  3.9   |  27  |  0.9    Ca    8.6      14 Sep 2022 05:00  Mg     1.8     09-14    TPro  5.0<L>  /  Alb  3.1<L>  /  TBili  0.3  /  DBili  x   /  AST  21  /  ALT  21  /  AlkPhos  95  09-14      Lactate, Blood: 1.5 mmol/L (22 @ 21:49)          Troponin trend:        RADIOLOGY:    ECG:    TELEMETRY EVENTS:

## 2022-09-14 NOTE — PROGRESS NOTE ADULT - ASSESSMENT
Assessment and Plan:   IMPRESSION:    Acute hypercapnic respiratory failure  Acute hypoxemic respiratory failure  THERESA with decreased urine output - resolved   H/o Hypothyroidism  Altered mental status   SIRS  LLL opacity/pneumonia    PLAN:    CNS: CT head no acute changes. Daily spontaneous awakening trial. Routine EEG no signs of seizure activity. Following commands.   - SAT/SBT Trials Daily  - Failed today due to tachypnea and agitation      HEENT: Oral care; ETT care    PULMONARY: CTA chest no PE. CXR showing left basilar opacity/effusion. HOB @ 45 degrees.Low minute ventilation on SBT with PS of 8.   - Patient failed trial today due to inadequate respiratory effort and restlessness  - Attempt extubation tomorrow    CARDIOVASCULAR: No IVF. Trops are negative. Keep MAP more than 65mmhg. Fluid balance negative by 1L.    GI: OGT feeds to be restrated after SBT. Bowel regimen. GI prophylaxis.   - Monitor Bowel Movement - BM yesterday      RENAL: Follow up lytes. Correct as needed. Patrick catheter. Kidney function back to baseline.  - Monitor Urine Output  - Lasix 20 QD    INFECTIOUS DISEASE: Leukocytosis resolved. UA negative. Cultures negative. COVID PCR positive.   - Completed Abx    HEMATOLOGICAL: DVT prophylaxis: Lovenox SQ.     ENDOCRINE: Follow up FS. Insulin protocol if needed. Continue home meds.       MUSCULOSKELETAL: Bed in chair position.  - PT/OT Recommendations    DISPO: Critically ill and needs ICU level of care. Assessment and Plan:   IMPRESSION:    Acute hypercapnic respiratory failure  Acute hypoxemic respiratory failure  THERESA with decreased urine output - resolved   H/o Hypothyroidism  Altered mental status   SIRS  LLL opacity/pneumonia    PLAN:    CNS: CT head no acute changes. Daily spontaneous awakening trial. Routine EEG no signs of seizure activity. Following commands.   - SAT/SBT Trials Daily  - Failed today due to tachypnea and agitation      HEENT: Oral care; ETT care    PULMONARY: CTA chest no PE. CXR showing left basilar opacity/effusion. HOB @ 45 degrees.Low minute ventilation on SBT with PS of 8.   - Patient failed trial today due to inadequate respiratory effort and restlessness  - Attempt extubation tomorrow    CARDIOVASCULAR: No IVF. Trops are negative. Keep MAP more than 65mmhg. Fluid balance negative by 1L.    GI: OGT feeds to be restrated after SBT. Bowel regimen. GI prophylaxis.   - Monitor Bowel Movement - BM yesterday      RENAL: Follow up lytes. Correct as needed. Patrick catheter. Kidney function back to baseline.  - Monitor Urine Output  - Lasix 20 QD    INFECTIOUS DISEASE: Leukocytosis resolved. UA negative. Cultures negative. COVID PCR positive.   - Completed Abx  - Leukocytosis (WBC 13k) - Blood Cultures pending    HEMATOLOGICAL: DVT prophylaxis: Lovenox SQ.     ENDOCRINE: Follow up FS. Insulin protocol if needed. Continue home meds.     MUSCULOSKELETAL: Bed in chair position.  - PT/OT Recommendations    DISPO: Critically ill and needs ICU level of care. Assessment and Plan:   IMPRESSION:    Acute hypercapnic respiratory failure  Acute hypoxemic respiratory failure  THERESA with decreased urine output - resolved   H/o Hypothyroidism  Altered mental status   SIRS  LLL opacity/pneumonia    PLAN:    CNS: CT head no acute changes. Daily spontaneous awakening trial. Routine EEG no signs of seizure activity. Following commands.   - SAT/SBT Trials Daily  - Failed today due to tachypnea and agitation      HEENT: Oral care; ETT care    PULMONARY: CTA chest no PE. CXR showing left basilar opacity/effusion. HOB @ 45 degrees.Low minute ventilation on SBT with PS of 8.   - Patient failed trial today due to inadequate respiratory effort and restlessness  - Attempt extubation tomorrow    CARDIOVASCULAR: No IVF. Trops are negative. Keep MAP more than 65mmhg. Fluid balance negative by 1L goal, positive yesterday.    GI: OGT feeds to be restrated after SBT. Bowel regimen. GI prophylaxis.   - Monitor Bowel Movement - BM yesterday      RENAL: Follow up lytes. Correct as needed. Patrick catheter. Kidney function back to baseline.  - Monitor Urine Output  - Lasix 20 QD    INFECTIOUS DISEASE: Leukocytosis resolved. UA negative. Cultures negative. COVID PCR positive.   - Completed Abx  - Leukocytosis (WBC 13k) - Blood Cultures pending    HEMATOLOGICAL: DVT prophylaxis: Lovenox SQ.     ENDOCRINE: Follow up FS. Insulin protocol if needed. Continue home meds.     MUSCULOSKELETAL: Bed in chair position.  - PT/OT Recommendations    DISPO: Critically ill and needs ICU level of care.

## 2022-09-14 NOTE — PROGRESS NOTE ADULT - ASSESSMENT
· Assessment	  88yo F w/ PMHx of HTN, HLD, hypothyroidism, recent left femur fracture s/p surgery 1 month PTA in [facility], presented to ED from [facility] with hypoxia and hypotension. Intubated 9/7 and failed SBT on 9/9.    - pt has been receiving multiple cathartics and bowel regimen due to previously not having BM for several days. PPI given as well. Current loose stool is not malabsorptive in appearance, nor does it seem to be C diff. It is undoubtedly due to the bowel preps given. d/w resident and RN - pt is receiving a hydrolyzed formula, so malabsorption is unlikely anayway.  - continue Peptamen AF, but decrease to 360 mL 3x per day (provides 27 kcal/kg/day)   - order phos level for AM ( ordered) and replete PRN  - will continue to follow

## 2022-09-15 NOTE — PROGRESS NOTE ADULT - ASSESSMENT
Assessment and Plan:   IMPRESSION:    Acute hypercapnic respiratory failure  Acute hypoxemic respiratory failure  THERESA with decreased urine output - resolved   H/o Hypothyroidism  Altered mental status   SIRS  LLL opacity/pneumonia    PLAN:    CNS: CT head no acute changes. Daily spontaneous awakening trial. Routine EEG no signs of seizure activity. Following commands.   - SAT/SBT Trials Daily  - Failed today due to hypoxia and apnea; reintubated      HEENT: Oral care; ETT care    PULMONARY: CTA chest no PE. CXR showing left basilar opacity/effusion. HOB @ 45 degrees.Low minute ventilation on SBT with PS of 8.   - Patient failed trial today due to inadequate respiratory effort and restlessness    CARDIOVASCULAR: No IVF. Trops are negative. Keep MAP more than 65mmhg. Fluid balance negative by 1L goal, positive yesterday.    GI: OGT feeds to be restrated after SBT. Bowel regimen. GI prophylaxis.   - Monitor Bowel Movement     RENAL: Follow up lytes. Correct as needed. Patrick catheter. Kidney function back to baseline.  - Monitor Urine Output  - Lasix 20 QD    INFECTIOUS DISEASE: Leukocytosis resolved. UA negative. Cultures negative. COVID PCR positive.   - Completed Abx  - Leukocytosis (WBC 13k) - Blood Cultures pending    HEMATOLOGICAL: DVT prophylaxis: Lovenox SQ.     ENDOCRINE: Follow up FS. Insulin protocol if needed. Continue home meds.     MUSCULOSKELETAL: Bed in chair position.  - PT/OT Recommendations  - Skin assessed- B/L buttock and sacrum intact dark red liner striation, pigmentation                        B/L heel dry and intact, No pressure injury noted at time of assessment     DISPO: Critically ill and needs ICU level of care

## 2022-09-15 NOTE — PHYSICAL THERAPY INITIAL EVALUATION ADULT - SPECIFY REASON(S)
Physical therapy remains on hold due to pt is still vented and sedated, hypotensive on levo,  and cannot participate in therapy. PT will follow up as appropriate.
discussed with MARIYA Murguia. pt failed SBT earlier and became agitated. She was restarted on sedation. Will hold PT and follow up when pt can participate in therapy
pt s intubated and sedated at this time.  PT to follow when pt able to participate and is medically appropriate.
Per OT note in chart, discussed with medical resident who said to hold therapy until pt is extubated. PT will f/u as appropriate.

## 2022-09-15 NOTE — PROGRESS NOTE ADULT - SUBJECTIVE AND OBJECTIVE BOX
Patient is a 89y old  Female who presents with a chief complaint of AMS, respiratory failure (14 Sep 2022 17:52)        Over Night Events:        ROS:     All ROS are negative except HPI   Unable to assess ROS: patient intubated.        PHYSICAL EXAM    ICU Vital Signs Last 24 Hrs  T(C): 36.2 (15 Sep 2022 08:00), Max: 37.7 (14 Sep 2022 20:00)  T(F): 97.1 (15 Sep 2022 08:00), Max: 99.8 (14 Sep 2022 20:00)  HR: 59 (15 Sep 2022 08:00) (48 - 108)  BP: 93/54 (15 Sep 2022 08:00) (80/43 - 153/71)  BP(mean): 69 (15 Sep 2022 08:00) (59 - 98)  ABP: --  ABP(mean): --  RR: 35 (15 Sep 2022 08:00) (14 - 36)  SpO2: 100% (15 Sep 2022 08:00) (96% - 100%)    O2 Parameters below as of 15 Sep 2022 09:00  Patient On (Oxygen Delivery Method): ventilator    O2 Concentration (%): 35    NEURO: patient is an intubated but arousable and follows commands  GEN: Not in acute distress  NECK: no thyroid enlargement, no JVD  LUNGS: Clear to auscultation bilaterally   CARDIOVASCULAR: S1/S2 present, RRR , no murmurs or rubs, no carotid bruits,  + PP bilaterally  ABD: Soft, non-tender, non-distended, +BS  EXT: No KRISTOPHER  SKIN: Intact  ACCESS Site:      22 @ 07:01  -  09-15-22 @ 07:00  --------------------------------------------------------  IN:    Dexmedetomidine: 116 mL    Enteral Tube Flush: 30 mL    IV PiggyBack: 100 mL    Miscellaneous Tube Feedin mL    Norepinephrine: 17.3 mL    Peptamen A.F.: 1080 mL  Total IN: 1703.3 mL    OUT:    Dexmedetomidine: 0 mL    Indwelling Catheter - Urethral (mL): 1290 mL  Total OUT: 1290 mL    Total NET: 413.3 mL      09-15-22 @ 07:01  -  09-15-22 @ 09:09  --------------------------------------------------------  IN:    Dexmedetomidine: 10 mL    Norepinephrine: 9.2 mL  Total IN: 19.2 mL    OUT:    Indwelling Catheter - Urethral (mL): 25 mL  Total OUT: 25 mL    Total NET: -5.8 mL          LABS:                            10.1   12.72 )-----------( 287      ( 15 Sep 2022 04:41 )             30.6                                               09-15    133<L>  |  93<L>  |  57<H>  ----------------------------<  167<H>  3.9   |  29  |  0.9    Ca    8.6      15 Sep 2022 04:41  Mg     1.7     09-15    TPro  5.0<L>  /  Alb  3.0<L>  /  TBili  0.3  /  DBili  x   /  AST  35  /  ALT  42<H>  /  AlkPhos  97  09-15                                                                                           LIVER FUNCTIONS - ( 15 Sep 2022 04:41 )  Alb: 3.0 g/dL / Pro: 5.0 g/dL / ALK PHOS: 97 U/L / ALT: 42 U/L / AST: 35 U/L / GGT: x                                                  Culture - Blood (collected 13 Sep 2022 21:49)  Source: .Blood Blood-Peripheral  Preliminary Report (15 Sep 2022 02:02):    No growth to date.                                                   Mode: CPAP with PS  FiO2: 35  PEEP: 5  PS: 8  MAP: 8  PIP: 14                                      ABG - ( 15 Sep 2022 04:02 )  pH, Arterial: 7.55  pH, Blood: x     /  pCO2: 37    /  pO2: 122   / HCO3: 32    / Base Excess: 9.4   /  SaO2: 99.3                MEDICATIONS  (STANDING):  atorvastatin 20 milliGRAM(s) Oral at bedtime  bisacodyl 5 milliGRAM(s) Oral at bedtime  cefTRIAXone   IVPB 1000 milliGRAM(s) IV Intermittent every 24 hours  chlorhexidine 0.12% Liquid 15 milliLiter(s) Oral Mucosa two times a day  chlorhexidine 2% Cloths 1 Application(s) Topical <User Schedule>  dexMEDEtomidine Infusion 0.2 MICROgram(s)/kG/Hr (2.44 mL/Hr) IV Continuous <Continuous>  dextrose 5%. 1000 milliLiter(s) (50 mL/Hr) IV Continuous <Continuous>  dextrose 5%. 1000 milliLiter(s) (100 mL/Hr) IV Continuous <Continuous>  dextrose 50% Injectable 25 Gram(s) IV Push once  dextrose 50% Injectable 12.5 Gram(s) IV Push once  dextrose 50% Injectable 25 Gram(s) IV Push once  doxycycline IVPB 100 milliGRAM(s) IV Intermittent every 12 hours  enoxaparin Injectable 40 milliGRAM(s) SubCutaneous every 24 hours  furosemide   Injectable 20 milliGRAM(s) IV Push two times a day  glucagon  Injectable 1 milliGRAM(s) IntraMuscular once  insulin lispro (ADMELOG) corrective regimen sliding scale   SubCutaneous four times a day with meals  lactulose Syrup 10 Gram(s) Oral two times a day  levothyroxine 75 MICROGram(s) Oral daily  magnesium sulfate  IVPB 1 Gram(s) IV Intermittent once  mupirocin 2% Ointment 1 Application(s) Topical two times a day  norepinephrine Infusion 0.05 MICROgram(s)/kG/Min (4.57 mL/Hr) IV Continuous <Continuous>  polyethylene glycol 3350 17 Gram(s) Oral two times a day  senna 2 Tablet(s) Oral at bedtime    MEDICATIONS  (PRN):  acetaminophen     Tablet .. 650 milliGRAM(s) Oral every 6 hours PRN Temp greater or equal to 38C (100.4F), Mild Pain (1 - 3)  dextrose Oral Gel 15 Gram(s) Oral once PRN Blood Glucose LESS THAN 70 milliGRAM(s)/deciliter      New X-rays reviewed:   no new CXR    ECHO reviewed    CXR interpreted by me:       Patient is a 89y old  Female who presents with a chief complaint of AMS, respiratory failure (14 Sep 2022 17:52)        Over Night Events:    no acute overnight events  tolerating PSV well this AM, RSBI 70    ROS:       Unable to assess ROS: patient intubated.        PHYSICAL EXAM    ICU Vital Signs Last 24 Hrs  T(C): 36.2 (15 Sep 2022 08:00), Max: 37.7 (14 Sep 2022 20:00)  T(F): 97.1 (15 Sep 2022 08:00), Max: 99.8 (14 Sep 2022 20:00)  HR: 59 (15 Sep 2022 08:00) (48 - 108)  BP: 93/54 (15 Sep 2022 08:00) (80/43 - 153/71)  BP(mean): 69 (15 Sep 2022 08:00) (59 - 98)  ABP: --  ABP(mean): --  RR: 35 (15 Sep 2022 08:00) (14 - 36)  SpO2: 100% (15 Sep 2022 08:00) (96% - 100%)    O2 Parameters below as of 15 Sep 2022 09:00  Patient On (Oxygen Delivery Method): ventilator    O2 Concentration (%): 35    NEURO: patient is an intubated but arousable and follows commands  GEN: Not in acute distress  NECK: no thyroid enlargement, no JVD  LUNGS: Clear to auscultation bilaterally   CARDIOVASCULAR: S1/S2 present, RRR , no murmurs or rubs, no carotid bruits,  + PP bilaterally  ABD: Soft, non-tender, non-distended, +BS  EXT: No KRISTOPHER  SKIN: Intact  ACCESS Site:      22 @ 07:01  -  09-15-22 @ 07:00  --------------------------------------------------------  IN:    Dexmedetomidine: 116 mL    Enteral Tube Flush: 30 mL    IV PiggyBack: 100 mL    Miscellaneous Tube Feedin mL    Norepinephrine: 17.3 mL    Peptamen A.F.: 1080 mL  Total IN: 1703.3 mL    OUT:    Dexmedetomidine: 0 mL    Indwelling Catheter - Urethral (mL): 1290 mL  Total OUT: 1290 mL    Total NET: 413.3 mL      09-15-22 @ 07:01  -  09-15-22 @ 09:09  --------------------------------------------------------  IN:    Dexmedetomidine: 10 mL    Norepinephrine: 9.2 mL  Total IN: 19.2 mL    OUT:    Indwelling Catheter - Urethral (mL): 25 mL  Total OUT: 25 mL    Total NET: -5.8 mL          LABS:                            10.1   12.72 )-----------( 287      ( 15 Sep 2022 04:41 )             30.6                                               09-15    133<L>  |  93<L>  |  57<H>  ----------------------------<  167<H>  3.9   |  29  |  0.9    Ca    8.6      15 Sep 2022 04:41  Mg     1.7     09-15    TPro  5.0<L>  /  Alb  3.0<L>  /  TBili  0.3  /  DBili  x   /  AST  35  /  ALT  42<H>  /  AlkPhos  97  09-15                                                                                           LIVER FUNCTIONS - ( 15 Sep 2022 04:41 )  Alb: 3.0 g/dL / Pro: 5.0 g/dL / ALK PHOS: 97 U/L / ALT: 42 U/L / AST: 35 U/L / GGT: x                                                  Culture - Blood (collected 13 Sep 2022 21:49)  Source: .Blood Blood-Peripheral  Preliminary Report (15 Sep 2022 02:02):    No growth to date.                                                   Mode: CPAP with PS  FiO2: 35  PEEP: 5  PS: 8  MAP: 8  PIP: 14                                      ABG - ( 15 Sep 2022 04:02 )  pH, Arterial: 7.55  pH, Blood: x     /  pCO2: 37    /  pO2: 122   / HCO3: 32    / Base Excess: 9.4   /  SaO2: 99.3                MEDICATIONS  (STANDING):  atorvastatin 20 milliGRAM(s) Oral at bedtime  bisacodyl 5 milliGRAM(s) Oral at bedtime  cefTRIAXone   IVPB 1000 milliGRAM(s) IV Intermittent every 24 hours  chlorhexidine 0.12% Liquid 15 milliLiter(s) Oral Mucosa two times a day  chlorhexidine 2% Cloths 1 Application(s) Topical <User Schedule>  dexMEDEtomidine Infusion 0.2 MICROgram(s)/kG/Hr (2.44 mL/Hr) IV Continuous <Continuous>  dextrose 5%. 1000 milliLiter(s) (50 mL/Hr) IV Continuous <Continuous>  dextrose 5%. 1000 milliLiter(s) (100 mL/Hr) IV Continuous <Continuous>  dextrose 50% Injectable 25 Gram(s) IV Push once  dextrose 50% Injectable 12.5 Gram(s) IV Push once  dextrose 50% Injectable 25 Gram(s) IV Push once  doxycycline IVPB 100 milliGRAM(s) IV Intermittent every 12 hours  enoxaparin Injectable 40 milliGRAM(s) SubCutaneous every 24 hours  furosemide   Injectable 20 milliGRAM(s) IV Push two times a day  glucagon  Injectable 1 milliGRAM(s) IntraMuscular once  insulin lispro (ADMELOG) corrective regimen sliding scale   SubCutaneous four times a day with meals  lactulose Syrup 10 Gram(s) Oral two times a day  levothyroxine 75 MICROGram(s) Oral daily  magnesium sulfate  IVPB 1 Gram(s) IV Intermittent once  mupirocin 2% Ointment 1 Application(s) Topical two times a day  norepinephrine Infusion 0.05 MICROgram(s)/kG/Min (4.57 mL/Hr) IV Continuous <Continuous>  polyethylene glycol 3350 17 Gram(s) Oral two times a day  senna 2 Tablet(s) Oral at bedtime    MEDICATIONS  (PRN):  acetaminophen     Tablet .. 650 milliGRAM(s) Oral every 6 hours PRN Temp greater or equal to 38C (100.4F), Mild Pain (1 - 3)  dextrose Oral Gel 15 Gram(s) Oral once PRN Blood Glucose LESS THAN 70 milliGRAM(s)/deciliter      New X-rays reviewed:   no new CXR    ECHO reviewed    CXR interpreted by me:

## 2022-09-15 NOTE — PROGRESS NOTE ADULT - SUBJECTIVE AND OBJECTIVE BOX
HPI:    88yo F w/ pmhx of HTN, HLD, hypothyroidism, recent left femur fracture s/p surgery 1mo in [facility] presented to ED from [facility] with hypoxia and hypotension. Patient was saturating 50% on room air and improved only minimally on NRB in the ED. History was not able to be obtained by the patient as she was unresponsive and she was intubated for airway protection. Collateral HPI obtained by daughter Becky who said she was sent to the NH for rehab for her hip fracture. She denies any history of dementia. Baseline mental status is AAOx3 and prior to hip fracture patient was living by herself.     Of note patient was being treated in NH for R sided pneumonia (CXR : R perihilar infiltrate) and was receiving levaquin.     INTERVAL HISTORY:    - Patient failed SBT due to hypoxia and apnea  - Reintubated    PAST MEDICAL & SURGICAL HISTORY  HTN (hypertension)    High cholesterol    Hypothyroid    History of colon resection    H/O: hysterectomy        ALLERGIES:  penicillin (Unknown)      MEDICATIONS:  MEDICATIONS  (STANDING):  atorvastatin 20 milliGRAM(s) Oral at bedtime  chlorhexidine 0.12% Liquid 15 milliLiter(s) Oral Mucosa two times a day  chlorhexidine 2% Cloths 1 Application(s) Topical <User Schedule>  dexMEDEtomidine Infusion 0.2 MICROgram(s)/kG/Hr (2.44 mL/Hr) IV Continuous <Continuous>  dextrose 5%. 1000 milliLiter(s) (50 mL/Hr) IV Continuous <Continuous>  dextrose 5%. 1000 milliLiter(s) (100 mL/Hr) IV Continuous <Continuous>  dextrose 50% Injectable 25 Gram(s) IV Push once  dextrose 50% Injectable 12.5 Gram(s) IV Push once  dextrose 50% Injectable 25 Gram(s) IV Push once  doxycycline IVPB 100 milliGRAM(s) IV Intermittent every 12 hours  enoxaparin Injectable 40 milliGRAM(s) SubCutaneous every 24 hours  furosemide   Injectable 20 milliGRAM(s) IV Push two times a day  glucagon  Injectable 1 milliGRAM(s) IntraMuscular once  insulin lispro (ADMELOG) corrective regimen sliding scale   SubCutaneous four times a day with meals  levothyroxine 75 MICROGram(s) Oral daily  mupirocin 2% Ointment 1 Application(s) Topical two times a day  norepinephrine Infusion 0.05 MICROgram(s)/kG/Min (4.57 mL/Hr) IV Continuous <Continuous>  senna 2 Tablet(s) Oral at bedtime    MEDICATIONS  (PRN):  acetaminophen     Tablet .. 650 milliGRAM(s) Oral every 6 hours PRN Temp greater or equal to 38C (100.4F), Mild Pain (1 - 3)  dextrose Oral Gel 15 Gram(s) Oral once PRN Blood Glucose LESS THAN 70 milliGRAM(s)/deciliter      HOME MEDICATIONS:  Home Medications:  ascorbic acid 500 mg oral tablet: 1 tab(s) orally 2 times a day (07 Sep 2022 15:52)  docusate sodium 100 mg oral tablet: 1 tab(s) orally 2 times a day (07 Sep 2022 15:52)  lactulose 10 g oral powder for reconstitution: 30 milliliter(s) orally 2 times a day, As Needed for constipation (07 Sep 2022 15:52)  Lipitor 20 mg oral tablet: 1 tab(s) orally once a day (07 Sep 2022 15:52)  Melatonin 3 mg oral tablet: 1 tab(s) orally once a day (at bedtime) (07 Sep 2022 15:52)  Multiple Vitamins oral tablet: 1 tab(s) orally once a day (07 Sep 2022 15:52)  Synthroid 75 mcg (0.075 mg) oral tablet: 1 tab(s) orally once a day (07 Sep 2022 15:52)        OBJECTIVE:  ICU Vital Signs Last 24 Hrs  T(C): 36.1 (15 Sep 2022 12:00), Max: 37.7 (14 Sep 2022 20:00)  T(F): 97 (15 Sep 2022 12:00), Max: 99.8 (14 Sep 2022 20:00)  HR: 52 (15 Sep 2022 16:00) (48 - 96)  BP: 110/53 (15 Sep 2022 16:00) (80/43 - 149/73)  BP(mean): 77 (15 Sep 2022 16:00) (59 - 101)  ABP: --  ABP(mean): --  RR: 38 (15 Sep 2022 16:00) (14 - 51)  SpO2: 100% (15 Sep 2022 16:00) (64% - 100%)    O2 Parameters below as of 15 Sep 2022 17:00  Patient On (Oxygen Delivery Method): ventilator    O2 Concentration (%): 60      Mode: AC/ CMV (Assist Control/ Continuous Mandatory Ventilation)  RR (machine): 14  TV (machine): 400  FiO2: 100  PEEP: 5  MAP: 10  PIP: 25    14 Sep 2022 07:01  -  15 Sep 2022 07:00  --------------------------------------------------------  IN: 1703.3 mL / OUT: 1290 mL / NET: 413.3 mL    15 Sep 2022 07:01  -  15 Sep 2022 17:22  --------------------------------------------------------  IN: 159.8 mL / OUT: 540 mL / NET: -380.2 mL      Daily     Daily Weight in k.1 (15 Sep 2022 06:00)    PHYSICAL EXAM:  NEURO: patient is intubated and unresponsive  GEN: Not in acute distress  NECK: no thyroid enlargement, no JVD  LUNGS: Clear to auscultation bilaterally   CARDIOVASCULAR: S1/S2 present, RRR , no murmurs or rubs, no carotid bruits,  + PP bilaterally  ABD: Soft, non-tender, non-distended, +BS  EXT: No KRISTOPHER  SKIN: Intact  ACCESS Site:    LABS:                        10.1   12.72 )-----------( 287      ( 15 Sep 2022 04:41 )             30.6     09-15    133<L>  |  93<L>  |  57<H>  ----------------------------<  167<H>  3.9   |  29  |  0.9    Ca    8.6      15 Sep 2022 04:41  Mg     1.7     09-15    TPro  5.0<L>  /  Alb  3.0<L>  /  TBili  0.3  /  DBili  x   /  AST  35  /  ALT  42<H>  /  AlkPhos  97  09-15        RADIOLOGY:  -CXR: Left basilar opacity/effusion, unchanged  -TTE: ()   1. LV Ejection Fraction by Dinero's Method with a biplane EF of 50 %.   2. Spectral Doppler shows impaired relaxation pattern of left   ventricular myocardial filling (Grade I diastolic dysfunction).   3. Mildly enlarged left atrium.   4. Mildly enlarged right atrium.   5. Mild mitral valve regurgitation.   6. Moderate mitral annular calcification.   7. Mild tricuspid regurgitation.   8. Sclerotic aortic valve with normal opening.   9. Estimated pulmonary artery systolic pressure is 42.0 mmHg assuming a   right atrial pressure of 10 mmHg, which is consistent with mild pulmonary   hypertension.    ECG:    TELEMETRY EVENTS:

## 2022-09-15 NOTE — PROGRESS NOTE ADULT - ASSESSMENT
Assessment and Plan:   IMPRESSION:    Acute hypercapnic respiratory failure  Acute hypoxemic respiratory failure  THERESA with decreased urine output - resolved   H/o Hypothyroidism  Altered mental status   SIRS  LLL opacity/pneumonia    PLAN:    CNS: CT head no acute changes. Daily spontaneous awakening trial.  Following commands.   - SAT/SBT Trials Daily  - Failed today due to tachypnea and agitation      HEENT: Oral care; ETT care    PULMONARY: CTA chest no PE. CXR showing left basilar opacity/effusion. HOB @ 45 degrees.  - Pass SBT today, RSBI 70, follows commands  - Extubation today    CARDIOVASCULAR: No IVF. Trops are negative. Keep MAP more than 65mmhg. Fluid balance negative by 1L goal, positive yesterday.  On low-dose pressor    GI: OGT feeds to be restrated after SBT. Bowel regimen. GI prophylaxis.   - Monitor Bowel Movement - BM yesterday      RENAL: Follow up lytes. Correct as needed. Patrick catheter. Kidney function back to baseline.  - Monitor Urine Output  - Lasix 20 BID    INFECTIOUS DISEASE: Leukocytosis resolved. UA negative. Cultures negative. COVID PCR positive.   - Completed Abx  - Leukocytosis (WBC 12k) - Blood Cultures pending    HEMATOLOGICAL: DVT prophylaxis: Lovenox SQ.     ENDOCRINE: Follow up FS. Insulin protocol if needed. Continue home meds.     MUSCULOSKELETAL: Bed in chair position.  - PT/OT Recommendations    DISPO: Critically ill and needs ICU level of care. Assessment and Plan:   IMPRESSION:    Acute hypercapnic respiratory failure  Acute hypoxemic respiratory failure  THERESA with decreased urine output - resolved   H/o Hypothyroidism  Altered mental status   SIRS  LLL opacity/pneumonia    PLAN:    CNS: CT head no acute changes. Daily spontaneous awakening trial.  Following commands.   - SAT/SBT Trials Daily      HEENT: Oral care; ETT care    PULMONARY: CTA chest no PE. CXR showing left basilar opacity/effusion. HOB @ 45 degrees.  - Pass SBT today, RSBI 70, follows commands  - Now with metabolic alkalosis, will likely have some minimal CO2 retention  - Extubation today    CARDIOVASCULAR: No IVF. Trops are negative. Keep MAP more than 65mmhg. Fluid balance negative by 1L goal, positive yesterday.  On low-dose pressor    GI: OGT feeds to be restrated after SBT. Bowel regimen. GI prophylaxis.   - Monitor Bowel Movement - BM yesterday      RENAL: Follow up lytes. Correct as needed. Patrick catheter. Kidney function back to baseline.  - Monitor Urine Output  - Lasix 20 BID    INFECTIOUS DISEASE: Leukocytosis resolved. UA negative. Cultures negative. COVID PCR positive.   - Completed Abx  - Leukocytosis downtrending (WBC 12k) - Blood Cultures pending    HEMATOLOGICAL: DVT prophylaxis: Lovenox SQ.     ENDOCRINE: Follow up FS. Insulin protocol if needed. Continue home meds.     MUSCULOSKELETAL: Bed in chair position.  - PT/OT Recommendations    DISPO: Critically ill and needs ICU level of care.

## 2022-09-16 NOTE — PROGRESS NOTE ADULT - ASSESSMENT
IMP:  88yo F w/ PMHx of HTN, HLD, hypothyroidism, recent left femur fracture s/p surgery 1 month PTA in [facility], presented to ED from [facility] with hypoxia and hypotension. Intubated 9/7 and failed SBT on 9/9.    - no longer having loose BMs, bowel preps adjusted, cont to receive hydrolyzed Rx  - continue Peptamen AF, but decrease to 360 mL 3x per day (provides 27 kcal/kg/day)   - d/w resident - to repeat phos and correct severe hypophosphatemia  - will continue to follow

## 2022-09-16 NOTE — PROCEDURAL SAFETY CHECKLIST WITH OR WITHOUT SEDATION - NSPREANESCONSENT_GEN_ALL_CORE
LOV06/09/2021  Upcoming visit06/08/2022  Last labs02/01/2022  Last [id number]/2021  Refill per standing order n/a

## 2022-09-16 NOTE — PROGRESS NOTE ADULT - SUBJECTIVE AND OBJECTIVE BOX
Patient is a 89y old  Female who presents with a chief complaint of AMS, respiratory failure (15 Sep 2022 17:21)        Over Night Events:    extubated for passing SBT and good mental status, but then shortly reintubated due to agitation and altered mental status  bradycardia with increasing doses of precedex    ROS:       Unable to assess ROS: patient intubated.        PHYSICAL EXAM    ICU Vital Signs Last 24 Hrs  T(C): 35.9 (16 Sep 2022 08:00), Max: 36.9 (15 Sep 2022 16:00)  T(F): 96.6 (16 Sep 2022 08:00), Max: 98.4 (15 Sep 2022 16:00)  HR: 59 (16 Sep 2022 08:00) (44 - 87)  BP: 122/59 (16 Sep 2022 08:00) (78/40 - 149/73)  BP(mean): 85 (16 Sep 2022 08:00) (54 - 101)  ABP: --  ABP(mean): --  RR: 37 (16 Sep 2022 08:00) (26 - 51)  SpO2: 100% (16 Sep 2022 08:00) (64% - 100%)    O2 Parameters below as of 16 Sep 2022 08:00      O2 Concentration (%): 50    NEURO: patient is an intubated but arousable and follows commands  GEN: Not in acute distress  NECK: no thyroid enlargement, no JVD  LUNGS: Clear to auscultation bilaterally   CARDIOVASCULAR: S1/S2 present, RRR , no murmurs or rubs, no carotid bruits,  + PP bilaterally  ABD: Soft, non-tender, non-distended, +BS  EXT: No KRISTOPHER  SKIN: Intact  ACCESS Site:        09-15-22 @ 07:01  -  22 @ 07:00  --------------------------------------------------------  IN:    Dexmedetomidine: 84.9 mL    IV PiggyBack: 200 mL    Miscellaneous Tube Feedin mL    Norepinephrine: 179.9 mL    Peptamen A.F.: 360 mL  Total IN: 1184.8 mL    OUT:    Indwelling Catheter - Urethral (mL): 1155 mL  Total OUT: 1155 mL    Total NET: 29.8 mL      22 @ 07:01  -  22 @ 09:32  --------------------------------------------------------  IN:    Dexmedetomidine: 4.8 mL    Norepinephrine: 30 mL  Total IN: 34.8 mL    OUT:    Indwelling Catheter - Urethral (mL): 225 mL  Total OUT: 225 mL    Total NET: -190.2 mL          LABS:                            11.3   14.03 )-----------( 375      ( 16 Sep 2022 05:00 )             34.1                                                   135  |  90<L>  |  53<H>  ----------------------------<  133<H>  3.4<L>   |  30  |  0.9    Ca    9.1      16 Sep 2022 05:00  Mg     2.2         TPro  5.8<L>  /  Alb  3.5  /  TBili  0.5  /  DBili  x   /  AST  28  /  ALT  38  /  AlkPhos  107                                                                                             LIVER FUNCTIONS - ( 16 Sep 2022 05:00 )  Alb: 3.5 g/dL / Pro: 5.8 g/dL / ALK PHOS: 107 U/L / ALT: 38 U/L / AST: 28 U/L / GGT: x                                                  Culture - Blood (collected 13 Sep 2022 21:49)  Source: .Blood Blood-Peripheral  Preliminary Report (15 Sep 2022 02:02):    No growth to date.                                                   Mode: AC/ CMV (Assist Control/ Continuous Mandatory Ventilation)  RR (machine): 14  TV (machine): 390  FiO2: 60  PEEP: 5  ITime: 1  MAP: 8  PIP: 23                                      ABG - ( 15 Sep 2022 16:56 )  pH, Arterial: 7.54  pH, Blood: x     /  pCO2: 41    /  pO2: 177   / HCO3: 35    / Base Excess: 11.4  /  SaO2: 100.0               MEDICATIONS  (STANDING):  atorvastatin 20 milliGRAM(s) Oral at bedtime  chlorhexidine 0.12% Liquid 15 milliLiter(s) Oral Mucosa two times a day  chlorhexidine 2% Cloths 1 Application(s) Topical <User Schedule>  dexMEDEtomidine Infusion 0.2 MICROgram(s)/kG/Hr (2.44 mL/Hr) IV Continuous <Continuous>  dextrose 5%. 1000 milliLiter(s) (100 mL/Hr) IV Continuous <Continuous>  dextrose 5%. 1000 milliLiter(s) (50 mL/Hr) IV Continuous <Continuous>  dextrose 50% Injectable 25 Gram(s) IV Push once  dextrose 50% Injectable 12.5 Gram(s) IV Push once  dextrose 50% Injectable 25 Gram(s) IV Push once  doxycycline IVPB 100 milliGRAM(s) IV Intermittent every 12 hours  enoxaparin Injectable 40 milliGRAM(s) SubCutaneous every 24 hours  furosemide   Injectable 20 milliGRAM(s) IV Push two times a day  glucagon  Injectable 1 milliGRAM(s) IntraMuscular once  insulin lispro (ADMELOG) corrective regimen sliding scale   SubCutaneous four times a day with meals  levothyroxine 75 MICROGram(s) Oral daily  mupirocin 2% Ointment 1 Application(s) Topical two times a day  norepinephrine Infusion 0.05 MICROgram(s)/kG/Min (4.57 mL/Hr) IV Continuous <Continuous>  senna 2 Tablet(s) Oral at bedtime    MEDICATIONS  (PRN):  acetaminophen     Tablet .. 650 milliGRAM(s) Oral every 6 hours PRN Temp greater or equal to 38C (100.4F), Mild Pain (1 - 3)  dextrose Oral Gel 15 Gram(s) Oral once PRN Blood Glucose LESS THAN 70 milliGRAM(s)/deciliter      New X-rays reviewed:   CXR  compared with 9/15, remains clear and stable by my read    ECHO reviewed    CXR interpreted by me

## 2022-09-16 NOTE — PROGRESS NOTE ADULT - ASSESSMENT
Assessment and Plan:   IMPRESSION:    Acute hypercapnic respiratory failure  Acute hypoxemic respiratory failure  THERESA with decreased urine output - resolved   H/o Hypothyroidism  Altered mental status   Acute Toxic Metabolic Encephalopathy  SIRS  LLL opacity/pneumonia    PLAN:    CNS: Following commands. Zyprexa 2.5mg QHS  - SAT/SBT Trials Daily, when extubated c/w precedex in the short term      HEENT: Oral care; ETT care  Vent day #9, failed extubation  Discuss trache with family, if OK consult thoracic surgery for early next week    PULMONARY:   - ABG today  - HOB @ 45 degrees.  - Extubation today    CARDIOVASCULAR: No IVF. Bradycardic response to precedex, d/c if symptomatic or worsening. Keep MAP more than 65mmhg. Fluid balance even, target even.  On low-dose pressor.  Repeat EKG in AM    GI: OGT feeds to be restrated after SBT. Bowel regimen. GI prophylaxis.   Bowel regimen      RENAL: Follow up lytes. Correct as needed. Patrick catheter. Kidney function back to baseline.  - Monitor Urine Output  - Lasix 20 BID, keep even    INFECTIOUS DISEASE: Leukocytosis resolved. UA negative. Cultures negative. COVID PCR positive.   - Completed Abx  - Leukocytosis downtrending (WBC 12k)    HEMATOLOGICAL: DVT prophylaxis: Lovenox SQ.     ENDOCRINE: Follow up FS. Insulin protocol if needed. Continue home meds.     MUSCULOSKELETAL: Bed in chair position.  - PT/OT Recommendations    DISPO: Critically ill and needs ICU level of care.

## 2022-09-16 NOTE — PROGRESS NOTE ADULT - ASSESSMENT
· Assessment	  Assessment and Plan:   IMPRESSION:    Acute hypercapnic respiratory failure  Acute hypoxemic respiratory failure  THERESA with decreased urine output - resolved   H/o Hypothyroidism  Altered mental status   SIRS  LLL opacity/pneumonia    PLAN:    CNS: CT head no acute changes. Daily spontaneous awakening trial. Routine EEG no signs of seizure activity. Following commands.   - SAT/SBT Trials Daily  - Failed today due to hypoxia and apnea; reintubated      HEENT: Oral care; ETT care    PULMONARY: CTA chest no PE. CXR showing left basilar opacity/effusion. HOB @ 45 degrees.Low minute ventilation on SBT with PS of 8.   - Patient failed trial today due to inadequate respiratory effort and restlessness    CARDIOVASCULAR: No IVF. Trops are negative. Keep MAP more than 65mmhg. Fluid balance negative by 1L goal, positive yesterday.    GI: OGT feeds to be restrated after SBT. Bowel regimen. GI prophylaxis.   - Monitor Bowel Movement     RENAL: Follow up lytes. Correct as needed. Patrick catheter. Kidney function back to baseline.  - Monitor Urine Output  - Lasix 20 QD    INFECTIOUS DISEASE: Leukocytosis resolved. UA negative. Cultures negative. COVID PCR positive.   - Completed Abx  - Leukocytosis (WBC 13k) - Blood Cultures pending    HEMATOLOGICAL: DVT prophylaxis: Lovenox SQ.     ENDOCRINE: Follow up FS. Insulin protocol if needed. Continue home meds.     MUSCULOSKELETAL: Bed in chair position.  - PT/OT Recommendations  - Skin assessed- B/L buttock and sacrum intact dark red liner striation, pigmentation                        B/L heel dry and intact, No pressure injury noted at time of assessment     DISPO: Critically ill and needs ICU level of care

## 2022-09-16 NOTE — PROCEDURE NOTE - NSPROCDETAILS_GEN_ALL_CORE
guidewire recovered/lumen(s) aspirated and flushed/sterile dressing applied/sterile technique, catheter placed/ultrasound guidance with use of sterile gel and probe cove
location identified, draped/prepped, sterile technique used/sterile technique, catheter placed/supine position/ultrasound guidance

## 2022-09-16 NOTE — PROGRESS NOTE ADULT - NUTRITIONAL ASSESSMENT
Current Diet: Diet, NPO with Tube Feed:   Tube Feeding Modality: Orogastric  Peptamen A.F. Formula  Total Volume for 24 Hours (mL): 1440  Bolus  Total Volume of Bolus (mL):  360  Tube Feed Frequency: Every 6 hours   Tube Feed Start Time: 00:00  Bolus Feed Rate (mL per Hour): 360   Bolus Feed Duration (in Hours): 24 (09-08-22 @ 23:55)
- no longer having loose BMs, bowel preps adjusted, cont to receive hydrolyzed Rx  - continue Peptamen AF, but decrease to 360 mL 3x per day (provides 27 kcal/kg/day)   - d/w resident - to repeat phos and correct severe hypophosphatemia  - will continue to follow

## 2022-09-16 NOTE — PROGRESS NOTE ADULT - SUBJECTIVE AND OBJECTIVE BOX
HPI:    89yo F w/ pmhx of HTN, HLD, hypothyroidism, recent left femur fracture s/p surgery 1mo in [facility] presented to ED from [facility] with hypoxia and hypotension. Patient was saturating 50% on room air and improved only minimally on NRB in the ED. History was not able to be obtained by the patient as she was unresponsive and she was intubated for airway protection. Collateral HPI obtained by daughter Becky who said she was sent to the NH for rehab for her hip fracture. She denies any history of dementia. Baseline mental status is AAOx3 and prior to hip fracture patient was living by herself.     Of note patient was being treated in NH for R sided pneumonia (CXR 9/6: R perihilar infiltrate) and was receiving levaquin.     INTERVAL HISTORY:    - Patient failed SBT due to hypoxia and agitation; Reintubated  -  HPI:    89yo F w/ pmhx of HTN, HLD, hypothyroidism, recent left femur fracture s/p surgery 1mo in [facility] presented to ED from [facility] with hypoxia and hypotension. Patient was saturating 50% on room air and improved only minimally on NRB in the ED. History was not able to be obtained by the patient as she was unresponsive and she was intubated for airway protection. Collateral HPI obtained by daughter Becky who said she was sent to the NH for rehab for her hip fracture. She denies any history of dementia. Baseline mental status is AAOx3 and prior to hip fracture patient was living by herself.     Of note patient was being treated in NH for R sided pneumonia (CXR : R perihilar infiltrate) and was receiving levaquin.     INTERVAL HISTORY:    - Patient failed SBT due to hypoxia and agitation; Reintubated  - SAT/SBT Daily      PAST MEDICAL & SURGICAL HISTORY  HTN (hypertension)    High cholesterol    Hypothyroid    History of colon resection    H/O: hysterectomy        ALLERGIES:  penicillin (Unknown)      MEDICATIONS:  MEDICATIONS  (STANDING):  atorvastatin 20 milliGRAM(s) Oral at bedtime  chlorhexidine 0.12% Liquid 15 milliLiter(s) Oral Mucosa two times a day  chlorhexidine 2% Cloths 1 Application(s) Topical <User Schedule>  dexMEDEtomidine Infusion 0.2 MICROgram(s)/kG/Hr (2.44 mL/Hr) IV Continuous <Continuous>  dextrose 5%. 1000 milliLiter(s) (50 mL/Hr) IV Continuous <Continuous>  dextrose 5%. 1000 milliLiter(s) (100 mL/Hr) IV Continuous <Continuous>  dextrose 50% Injectable 25 Gram(s) IV Push once  dextrose 50% Injectable 12.5 Gram(s) IV Push once  dextrose 50% Injectable 25 Gram(s) IV Push once  doxycycline IVPB 100 milliGRAM(s) IV Intermittent every 12 hours  enoxaparin Injectable 40 milliGRAM(s) SubCutaneous every 24 hours  furosemide   Injectable 20 milliGRAM(s) IV Push two times a day  glucagon  Injectable 1 milliGRAM(s) IntraMuscular once  insulin lispro (ADMELOG) corrective regimen sliding scale   SubCutaneous four times a day with meals  levothyroxine 75 MICROGram(s) Oral daily  mupirocin 2% Ointment 1 Application(s) Topical two times a day  norepinephrine Infusion 0.05 MICROgram(s)/kG/Min (4.57 mL/Hr) IV Continuous <Continuous>  OLANZapine 2.5 milliGRAM(s) Oral at bedtime  senna 2 Tablet(s) Oral at bedtime  sodium phosphate IVPB 15 milliMole(s) IV Intermittent once    MEDICATIONS  (PRN):  acetaminophen     Tablet .. 650 milliGRAM(s) Oral every 6 hours PRN Temp greater or equal to 38C (100.4F), Mild Pain (1 - 3)  dextrose Oral Gel 15 Gram(s) Oral once PRN Blood Glucose LESS THAN 70 milliGRAM(s)/deciliter      HOME MEDICATIONS:  Home Medications:  ascorbic acid 500 mg oral tablet: 1 tab(s) orally 2 times a day (07 Sep 2022 15:52)  docusate sodium 100 mg oral tablet: 1 tab(s) orally 2 times a day (07 Sep 2022 15:52)  lactulose 10 g oral powder for reconstitution: 30 milliliter(s) orally 2 times a day, As Needed for constipation (07 Sep 2022 15:52)  Lipitor 20 mg oral tablet: 1 tab(s) orally once a day (07 Sep 2022 15:52)  Melatonin 3 mg oral tablet: 1 tab(s) orally once a day (at bedtime) (07 Sep 2022 15:52)  Multiple Vitamins oral tablet: 1 tab(s) orally once a day (07 Sep 2022 15:52)  Synthroid 75 mcg (0.075 mg) oral tablet: 1 tab(s) orally once a day (07 Sep 2022 15:52)        OBJECTIVE:  ICU Vital Signs Last 24 Hrs  T(C): 35.6 (16 Sep 2022 12:00), Max: 36.9 (15 Sep 2022 16:00)  T(F): 96 (16 Sep 2022 12:00), Max: 98.4 (15 Sep 2022 16:00)  HR: 63 (16 Sep 2022 12:00) (44 - 78)  BP: 112/56 (16 Sep 2022 12:00) (78/40 - 144/64)  BP(mean): 81 (16 Sep 2022 12:00) (54 - 92)  ABP: --  ABP(mean): --  RR: 38 (16 Sep 2022 12:00) (26 - 40)  SpO2: 100% (16 Sep 2022 12:00) (100% - 100%)    O2 Parameters below as of 16 Sep 2022 12:00  Patient On (Oxygen Delivery Method): ventilator          Mode: AC/ CMV (Assist Control/ Continuous Mandatory Ventilation)  RR (machine): 14  TV (machine): 390  FiO2: 60  PEEP: 5  ITime: 1.1  MAP: 9  PIP: 24      15 Sep 2022 07:01  -  16 Sep 2022 07:00  --------------------------------------------------------  IN: 1184.8 mL / OUT: 1155 mL / NET: 29.8 mL    16 Sep 2022 07:01  -  16 Sep 2022 13:50  --------------------------------------------------------  IN: 675.3 mL / OUT: 505 mL / NET: 170.3 mL      Daily     Daily Weight in k.4 (16 Sep 2022 06:00)    PHYSICAL EXAM:  NEURO: patient is awake , alert and oriented  GEN: Not in acute distress  NECK: no thyroid enlargement, no JVD  LUNGS: Clear to auscultation bilaterally   CARDIOVASCULAR: S1/S2 present, RRR , no murmurs or rubs, no carotid bruits,  + PP bilaterally  ABD: Soft, non-tender, non-distended, +BS  EXT: No KRISTOPHER  SKIN: Intact  ACCESS Site:    LABS:                        11.3   14.03 )-----------( 375      ( 16 Sep 2022 05:00 )             34.1     09-16    135  |  90<L>  |  53<H>  ----------------------------<  133<H>  3.4<L>   |  30  |  0.9    Ca    9.1      16 Sep 2022 05:00  Mg     2.2     09-16    TPro  5.8<L>  /  Alb  3.5  /  TBili  0.5  /  DBili  x   /  AST  28  /  ALT  38  /  AlkPhos  107  09-16              Troponin trend:            RADIOLOGY:  -CXR:  -TTE:  -STRESS TEST:  -CATHETERIZATION:    ECG:    TELEMETRY EVENTS:       HPI:    88yo F w/ pmhx of HTN, HLD, hypothyroidism, recent left femur fracture s/p surgery 1mo in [facility] presented to ED from [facility] with hypoxia and hypotension. Patient was saturating 50% on room air and improved only minimally on NRB in the ED. History was not able to be obtained by the patient as she was unresponsive and she was intubated for airway protection. Collateral HPI obtained by daughter Becky who said she was sent to the NH for rehab for her hip fracture. She denies any history of dementia. Baseline mental status is AAOx3 and prior to hip fracture patient was living by herself.     Of note patient was being treated in NH for R sided pneumonia (CXR : R perihilar infiltrate) and was receiving levaquin.     INTERVAL HISTORY:    - Patient failed SBT due to hypoxia and agitation; Reintubated  - SAT/SBT Daily  - Patient is anxious and agitated      PAST MEDICAL & SURGICAL HISTORY  HTN (hypertension)    High cholesterol    Hypothyroid    History of colon resection    H/O: hysterectomy        ALLERGIES:  penicillin (Unknown)      MEDICATIONS:  MEDICATIONS  (STANDING):  atorvastatin 20 milliGRAM(s) Oral at bedtime  chlorhexidine 0.12% Liquid 15 milliLiter(s) Oral Mucosa two times a day  chlorhexidine 2% Cloths 1 Application(s) Topical <User Schedule>  dexMEDEtomidine Infusion 0.2 MICROgram(s)/kG/Hr (2.44 mL/Hr) IV Continuous <Continuous>  dextrose 5%. 1000 milliLiter(s) (50 mL/Hr) IV Continuous <Continuous>  dextrose 5%. 1000 milliLiter(s) (100 mL/Hr) IV Continuous <Continuous>  dextrose 50% Injectable 25 Gram(s) IV Push once  dextrose 50% Injectable 12.5 Gram(s) IV Push once  dextrose 50% Injectable 25 Gram(s) IV Push once  doxycycline IVPB 100 milliGRAM(s) IV Intermittent every 12 hours  enoxaparin Injectable 40 milliGRAM(s) SubCutaneous every 24 hours  furosemide   Injectable 20 milliGRAM(s) IV Push two times a day  glucagon  Injectable 1 milliGRAM(s) IntraMuscular once  insulin lispro (ADMELOG) corrective regimen sliding scale   SubCutaneous four times a day with meals  levothyroxine 75 MICROGram(s) Oral daily  mupirocin 2% Ointment 1 Application(s) Topical two times a day  norepinephrine Infusion 0.05 MICROgram(s)/kG/Min (4.57 mL/Hr) IV Continuous <Continuous>  OLANZapine 2.5 milliGRAM(s) Oral at bedtime  senna 2 Tablet(s) Oral at bedtime  sodium phosphate IVPB 15 milliMole(s) IV Intermittent once    MEDICATIONS  (PRN):  acetaminophen     Tablet .. 650 milliGRAM(s) Oral every 6 hours PRN Temp greater or equal to 38C (100.4F), Mild Pain (1 - 3)  dextrose Oral Gel 15 Gram(s) Oral once PRN Blood Glucose LESS THAN 70 milliGRAM(s)/deciliter      HOME MEDICATIONS:  Home Medications:  ascorbic acid 500 mg oral tablet: 1 tab(s) orally 2 times a day (07 Sep 2022 15:52)  docusate sodium 100 mg oral tablet: 1 tab(s) orally 2 times a day (07 Sep 2022 15:52)  lactulose 10 g oral powder for reconstitution: 30 milliliter(s) orally 2 times a day, As Needed for constipation (07 Sep 2022 15:52)  Lipitor 20 mg oral tablet: 1 tab(s) orally once a day (07 Sep 2022 15:52)  Melatonin 3 mg oral tablet: 1 tab(s) orally once a day (at bedtime) (07 Sep 2022 15:52)  Multiple Vitamins oral tablet: 1 tab(s) orally once a day (07 Sep 2022 15:52)  Synthroid 75 mcg (0.075 mg) oral tablet: 1 tab(s) orally once a day (07 Sep 2022 15:52)        OBJECTIVE:  ICU Vital Signs Last 24 Hrs  T(C): 35.6 (16 Sep 2022 12:00), Max: 36.9 (15 Sep 2022 16:00)  T(F): 96 (16 Sep 2022 12:00), Max: 98.4 (15 Sep 2022 16:00)  HR: 63 (16 Sep 2022 12:00) (44 - 78)  BP: 112/56 (16 Sep 2022 12:00) (78/40 - 144/64)  BP(mean): 81 (16 Sep 2022 12:00) (54 - 92)  ABP: --  ABP(mean): --  RR: 38 (16 Sep 2022 12:00) (26 - 40)  SpO2: 100% (16 Sep 2022 12:00) (100% - 100%)    O2 Parameters below as of 16 Sep 2022 12:00  Patient On (Oxygen Delivery Method): ventilator          Mode: AC/ CMV (Assist Control/ Continuous Mandatory Ventilation)  RR (machine): 14  TV (machine): 390  FiO2: 60  PEEP: 5  ITime: 1.1  MAP: 9  PIP: 24      15 Sep 2022 07:01  -  16 Sep 2022 07:00  --------------------------------------------------------  IN: 1184.8 mL / OUT: 1155 mL / NET: 29.8 mL    16 Sep 2022 07:01  -  16 Sep 2022 13:50  --------------------------------------------------------  IN: 675.3 mL / OUT: 505 mL / NET: 170.3 mL      Daily     Daily Weight in k.4 (16 Sep 2022 06:00)    PHYSICAL EXAM:  NEURO: patient is awake , alert and oriented  GEN: Not in acute distress  NECK: no thyroid enlargement, no JVD  LUNGS: Clear to auscultation bilaterally   CARDIOVASCULAR: S1/S2 present, RRR , no murmurs or rubs, no carotid bruits,  + PP bilaterally  ABD: Soft, non-tender, non-distended, +BS  EXT: No KRISTOPHER  SKIN: Intact  ACCESS Site:    LABS:                        11.3   14.03 )-----------( 375      ( 16 Sep 2022 05:00 )             34.1     09-16    135  |  90<L>  |  53<H>  ----------------------------<  133<H>  3.4<L>   |  30  |  0.9    Ca    9.1      16 Sep 2022 05:00  Mg     2.2     09-16    TPro  5.8<L>  /  Alb  3.5  /  TBili  0.5  /  DBili  x   /  AST  28  /  ALT  38  /  AlkPhos  107  09-16              Troponin trend:            RADIOLOGY:  -CXR:  -TTE:  -STRESS TEST:  -CATHETERIZATION:    ECG:    TELEMETRY EVENTS:

## 2022-09-16 NOTE — PROGRESS NOTE ADULT - SUBJECTIVE AND OBJECTIVE BOX
NUTRITION SUPPORT TEAM  -  PROGRESS NOTE     Interval Events:  pt successfully extubated yesterday then reintubated due to agitation and tachypnea  pt awake now, anxious, on precedex  on low dose levo this morning  R midline not functioning - plan to place IJ  abd soft, feeds tolerated, one soft BM yesterday - now only on senna once a day at HS    VITALS:  T(F): 96 ( @ 12:00), Max: 98.2 ( @ 04:00)  HR: 63 ( @ 12:00) (44 - 78)  BP: 112/56 ( @ 12:00) (88/40 - 144/64)  RR: 38 ( @ :00) (26 - 40)  SpO2: 100% ( @ :00) (100% - 100%)    HEIGHT/WEIGHT/BMI:   Height (cm): 152.4 ()  Weight (kg): 48.7 ()  BMI (kg/m2): 21 ()    I/Os:     09-15-22 @ 07:01  -  22 @ 07:00  --------------------------------------------------------  IN:    Dexmedetomidine: 84.9 mL    IV PiggyBack: 200 mL    Miscellaneous Tube Feedin mL    Norepinephrine: 179.9 mL    Peptamen A.F.: 360 mL  Total IN: 1184.8 mL    OUT:    Indwelling Catheter - Urethral (mL): 1155 mL  Total OUT: 1155 mL    Total NET: 29.8 mL    STANDING MEDICATIONS:   atorvastatin 20 milliGRAM(s) Oral at bedtime  chlorhexidine 0.12% Liquid 15 milliLiter(s) Oral Mucosa two times a day  chlorhexidine 2% Cloths 1 Application(s) Topical <User Schedule>  dexMEDEtomidine Infusion 0.2 MICROgram(s)/kG/Hr IV Continuous <Continuous>  doxycycline IVPB 100 milliGRAM(s) IV Intermittent every 12 hours  enoxaparin Injectable 40 milliGRAM(s) SubCutaneous every 24 hours  furosemide   Injectable 20 milliGRAM(s) IV Push two times a day  insulin lispro (ADMELOG) corrective regimen sliding scale   SubCutaneous four times a day with meals  levothyroxine 75 MICROGram(s) Oral daily  midazolam Injectable 2 milliGRAM(s) IV Push once  mupirocin 2% Ointment 1 Application(s) Topical two times a day  norepinephrine Infusion 0.05 MICROgram(s)/kG/Min IV Continuous <Continuous>  OLANZapine 2.5 milliGRAM(s) Oral at bedtime  senna 2 Tablet(s) Oral at bedtime      LABS:                         11.3   14.03 )-----------( 375      ( 16 Sep 2022 05:00 )             34.1     135  |  90<L>  |  53<H>  ----------------------------<  133<H>          (22 @ 05:00)  3.4<L>   |  30  |  0.9    Ca    9.1          (22 @ 05:00)  Mg     2.2         (22 @ 05:00)    TPro  5.8<L>  /  Alb  3.5  /  TBili  0.5  /  DBili  x   /  AST  28  /  ALT  38  /  AlkPhos  107       22 @ 05:00    Phosphorus Level, Serum: 1.3 mg/dL (09.10.22 @ 04:42)   Blood Glucose (Past 24 hours):  145 mg/dL ( @ 12:13)  183 mg/dL ( @ 07:45)  140 mg/dL (09-15 @ 20:19)      DIET:   Diet, NPO with Tube Feed:   Tube Feeding Modality: Orogastric  Peptamen A.F. Formula  Total Volume for 24 Hours (mL): 1440  Bolus  Total Volume of Bolus (mL):  360  Tube Feed Frequency: Every 6 hours   Tube Feed Start Time: 00:00  Bolus Feed Rate (mL per Hour): 360   Bolus Feed Duration (in Hours): 24 (22 @ 23:55) [Active]    RADIOLOGY:   < from: Xray Chest 1 View-PORTABLE IMMEDIATE (Xray Chest 1 View-PORTABLE IMMEDIATE .) (09.15.22 @ 21:47) >  Support devices: Stable endotracheal tube. Enteric tube extends below   diaphragm.    Cardiac/mediastinum/hilum: Unchanged.    Lung parenchyma/Pleura: Stable left pleural effusion, opacity. No   pneumothorax.    < end of copied text >

## 2022-09-16 NOTE — PROGRESS NOTE ADULT - CRITICAL CARE ATTENDING COMMENT
This patient is critically ill due to the following:  * Hemodynamic instability requiring titration of vasopressors or other vasoactive agents  * Respiratory instability requiring management of invasive ventilation  * Multiple organ failure requiring complex decision-making, and there is a high probability of imminent or life-threatening deterioration in the patient’s condition  * The patient required frequent reassessments and monitoring to ensure response to interventions and therapies.    Critical care time includes time spent evaluating and treating the patient's acute illness as well as time spent reviewing labs, radiology,  and discussing the case with a multidisciplinary team in an effort to prevent further life threatening deterioration or end organ damage. This time is independent of any procedures performed.
This patient is critically ill due to the following:  * Hemodynamic instability requiring titration of vasopressors or other vasoactive agents  * Respiratory instability requiring management of invasive ventilation  * Multiple organ failure requiring complex decision-making, and there is a high probability of imminent or life-threatening deterioration in the patient’s condition  * The patient required frequent reassessments and monitoring to ensure response to interventions and therapies.    Critical care time includes time spent evaluating and treating the patient's acute illness as well as time spent reviewing labs, radiology,  and discussing the case with a multidisciplinary team in an effort to prevent further life threatening deterioration or end organ damage. This time is independent of any procedures performed.
This patient is critically ill due to the following:  * Respiratory instability requiring management of invasive ventilation  * Multiple organ failure requiring complex decision-making, and there is a high probability of imminent or life-threatening deterioration in the patient’s condition  * The patient required frequent reassessments and monitoring to ensure response to interventions and therapies.    Critical care time includes time spent evaluating and treating the patient's acute illness as well as time spent reviewing labs, radiology,  and discussing the case with a multidisciplinary team in an effort to prevent further life threatening deterioration or end organ damage. This time is independent of any procedures performed.
This patient is critically ill due to the following:  * Hemodynamic instability requiring titration of vasopressors or other vasoactive agents  * Respiratory instability requiring management of invasive ventilation  * Multiple organ failure requiring complex decision-making, and there is a high probability of imminent or life-threatening deterioration in the patient’s condition  * The patient required frequent reassessments and monitoring to ensure response to interventions and therapies.    Critical care time includes time spent evaluating and treating the patient's acute illness as well as time spent reviewing labs, radiology,  and discussing the case with a multidisciplinary team in an effort to prevent further life threatening deterioration or end organ damage. This time is independent of any procedures performed.
This patient is critically ill due to the following:  * Respiratory instability requiring management of invasive ventilation  * Multiple organ failure requiring complex decision-making, and there is a high probability of imminent or life-threatening deterioration in the patient’s condition  * The patient required frequent reassessments and monitoring to ensure response to interventions and therapies.    Critical care time includes time spent evaluating and treating the patient's acute illness as well as time spent reviewing labs, radiology,  and discussing the case with a multidisciplinary team in an effort to prevent further life threatening deterioration or end organ damage. This time is independent of any procedures performed.

## 2022-09-17 NOTE — PROGRESS NOTE ADULT - SUBJECTIVE AND OBJECTIVE BOX
HPI:  88yo F w/ pmhx of HTN, HLD, hypothyroidism, recent left femur fracture s/p surgery 1mo in [facility] presented to ED from [facility] with hypoxia and hypotension. Patient was saturating 50% on room air and improved only minimally on NRB in the ED. History was not able to be obtained by the patient as she was unresponsive and she was intubated for airway protection. Collateral HPI obtained by daughter Becky who said she was sent to the NH for rehab for her hip fracture. She denies any history of dementia. Baseline mental status is AAOx3 and prior to hip fracture patient was living by herself.     Of note patient was being treated in NH for R sided pneumonia (CXR 9/6: R perihilar infiltrate) and was receiving levaquin.         INTERVAL HISTORY:  - SAT/SBT  - Replete Potassium  - US of left lung    PAST MEDICAL & SURGICAL HISTORY  HTN (hypertension)    High cholesterol    Hypothyroid    History of colon resection    H/O: hysterectomy        ALLERGIES:  penicillin (Unknown)      MEDICATIONS:  MEDICATIONS  (STANDING):  atorvastatin 20 milliGRAM(s) Oral at bedtime  chlorhexidine 0.12% Liquid 15 milliLiter(s) Oral Mucosa two times a day  chlorhexidine 2% Cloths 1 Application(s) Topical <User Schedule>  dexMEDEtomidine Infusion 0.2 MICROgram(s)/kG/Hr (2.44 mL/Hr) IV Continuous <Continuous>  dextrose 5%. 1000 milliLiter(s) (50 mL/Hr) IV Continuous <Continuous>  dextrose 5%. 1000 milliLiter(s) (100 mL/Hr) IV Continuous <Continuous>  dextrose 50% Injectable 25 Gram(s) IV Push once  dextrose 50% Injectable 12.5 Gram(s) IV Push once  dextrose 50% Injectable 25 Gram(s) IV Push once  doxycycline IVPB 100 milliGRAM(s) IV Intermittent every 12 hours  enoxaparin Injectable 40 milliGRAM(s) SubCutaneous every 24 hours  furosemide   Injectable 20 milliGRAM(s) IV Push two times a day  glucagon  Injectable 1 milliGRAM(s) IntraMuscular once  insulin lispro (ADMELOG) corrective regimen sliding scale   SubCutaneous four times a day with meals  levothyroxine 75 MICROGram(s) Oral daily  mupirocin 2% Ointment 1 Application(s) Topical two times a day  norepinephrine Infusion 0.05 MICROgram(s)/kG/Min (4.57 mL/Hr) IV Continuous <Continuous>  OLANZapine 2.5 milliGRAM(s) Oral at bedtime  senna 2 Tablet(s) Oral at bedtime    MEDICATIONS  (PRN):  acetaminophen     Tablet .. 650 milliGRAM(s) Oral every 6 hours PRN Temp greater or equal to 38C (100.4F), Mild Pain (1 - 3)  dextrose Oral Gel 15 Gram(s) Oral once PRN Blood Glucose LESS THAN 70 milliGRAM(s)/deciliter      HOME MEDICATIONS:  Home Medications:  ascorbic acid 500 mg oral tablet: 1 tab(s) orally 2 times a day (07 Sep 2022 15:52)  docusate sodium 100 mg oral tablet: 1 tab(s) orally 2 times a day (07 Sep 2022 15:52)  lactulose 10 g oral powder for reconstitution: 30 milliliter(s) orally 2 times a day, As Needed for constipation (07 Sep 2022 15:52)  Lipitor 20 mg oral tablet: 1 tab(s) orally once a day (07 Sep 2022 15:52)  Melatonin 3 mg oral tablet: 1 tab(s) orally once a day (at bedtime) (07 Sep 2022 15:52)  Multiple Vitamins oral tablet: 1 tab(s) orally once a day (07 Sep 2022 15:52)  Synthroid 75 mcg (0.075 mg) oral tablet: 1 tab(s) orally once a day (07 Sep 2022 15:52)        OBJECTIVE:  ICU Vital Signs Last 24 Hrs  T(C): 35.8 (17 Sep 2022 08:00), Max: 36 (16 Sep 2022 16:00)  T(F): 96.4 (17 Sep 2022 08:00), Max: 96.8 (16 Sep 2022 16:00)  HR: 49 (17 Sep 2022 09:00) (48 - 80)  BP: 94/48 (17 Sep 2022 09:00) (83/40 - 145/64)  BP(mean): 69 (17 Sep 2022 09:00) (58 - 94)  ABP: --  ABP(mean): --  RR: 36 (17 Sep 2022 09:00) (33 - 49)  SpO2: 100% (17 Sep 2022 09:00) (100% - 100%)    O2 Parameters below as of 17 Sep 2022 08:00  Patient On (Oxygen Delivery Method): ventilator  O2 Flow (L/min): 60        Mode: AC/ CMV (Assist Control/ Continuous Mandatory Ventilation)  RR (machine): 14 -> 12  TV (machine): 390 -> 350  FiO2: 60 -> 40  PEEP: 5        16 Sep 2022 07:01  -  17 Sep 2022 07:00  --------------------------------------------------------  IN: 1771 mL / OUT: 1235 mL / NET: 536 mL        PHYSICAL EXAM:  NEURO: patient is awake, intubated, alert and confused  GEN: Not in acute distress  NECK: no thyroid enlargement, no JVD  LUNGS: Clear to auscultation bilaterally   CARDIOVASCULAR: S1/S2 present, RRR , no murmurs or rubs, no carotid bruits,  + PP bilaterally  ABD: Soft, non-tender, non-distended, +BS  EXT: No KRISTOPHER  SKIN: Intact  ACCESS Site:    LABS:                        10.5   10.70 )-----------( 340      ( 17 Sep 2022 05:00 )             31.0     09-17    138  |  94<L>  |  57<H>  ----------------------------<  141<H>  3.3<L>   |  33<H>  |  0.9    Ca    8.9      17 Sep 2022 05:00  Mg     2.0     09-17    TPro  5.4<L>  /  Alb  3.3<L>  /  TBili  0.4  /  DBili  x   /  AST  22  /  ALT  30  /  AlkPhos  104  09-17        RADIOLOGY:      ECG:    TELEMETRY EVENTS:

## 2022-09-17 NOTE — PROGRESS NOTE ADULT - ASSESSMENT
Assessment and Plan:   IMPRESSION:    Acute hypercapnic respiratory failure  Acute hypoxemic respiratory failure  THERESA with decreased urine output - resolved   H/o Hypothyroidism  Altered mental status   Acute Toxic Metabolic Encephalopathy  SIRS  LLL opacity/pneumonia    PLAN:    CNS: Following commands. Zyprexa 2.5mg QHS   SAT      HEENT: Oral care; ETT care lower TV to 350   RR to 12   will do bed side US to asses left side       PULMONARY:   - ABG today  - HOB @ 45 degrees.  - Extubation today    CARDIOVASCULAR: No IVF.     GI: OGT feeds    Bowel regimen      RENAL: Follow up lytes. Correct as needed. Patrick catheter. Kidney function back to baseline.  - Monitor Urine Output  - Lasix 20 BID, keep even  replace K   INFECTIOUS DISEASE: Leukocytosis resolved. UA negative. Cultures negative. COVID PCR positive.   - Completed Abx  - Leukocytosis downtrending (WBC 12k)    HEMATOLOGICAL: DVT prophylaxis: Lovenox SQ.     ENDOCRINE: Follow up FS. Insulin protocol if needed. Continue home meds.     MUSCULOSKELETAL: Bed in chair position.  - PT/OT Recommendations    DISPO: Critically ill and needs ICU level of care.

## 2022-09-17 NOTE — PROGRESS NOTE ADULT - SUBJECTIVE AND OBJECTIVE BOX
Patient is a 89y old  Female who presents with a chief complaint of AMS, respiratory failure (16 Sep 2022 13:43)      Over Night Events:  Patient seen and examined.   still on vent on 60%  on levo 0.05    ROS:  See HPI    PHYSICAL EXAM    ICU Vital Signs Last 24 Hrs  T(C): 35.8 (17 Sep 2022 04:00), Max: 36 (16 Sep 2022 16:00)  T(F): 96.4 (17 Sep 2022 04:00), Max: 96.8 (16 Sep 2022 16:00)  HR: 54 (17 Sep 2022 07:00) (48 - 80)  BP: 90/45 (17 Sep 2022 07:00) (83/40 - 145/64)  BP(mean): 65 (17 Sep 2022 07:00) (58 - 94)  ABP: --  ABP(mean): --  RR: 35 (17 Sep 2022 07:00) (33 - 49)  SpO2: 100% (17 Sep 2022 07:00) (100% - 100%)    O2 Parameters below as of 17 Sep 2022 04:00  Patient On (Oxygen Delivery Method): ventilator  O2 Flow (L/min): 60          General: awake   HEENT:     et tube            Lymph Nodes: NO cervical LN   Lungs: Bilateral BS  Cardiovascular: Regular   Abdomen: Soft, Positive BS  Extremities: No clubbing   Skin: warm   Neurological: no focal   Musculoskeletal: move all ext     I&O's Detail    16 Sep 2022 07:01  -  17 Sep 2022 07:00  --------------------------------------------------------  IN:    Dexmedetomidine: 83.6 mL    Miscellaneous Tube Feedin mL    Norepinephrine: 127.4 mL    Peptamen A.F.: 480 mL  Total IN: 1771 mL    OUT:    Indwelling Catheter - Urethral (mL): 1235 mL  Total OUT: 1235 mL    Total NET: 536 mL          LABS:                          10.5   10.70 )-----------( 340      ( 17 Sep 2022 05:00 )             31.0         17 Sep 2022 05:00    138    |  94     |  57     ----------------------------<  141    3.3     |  33     |  0.9      Ca    8.9        17 Sep 2022 05:00  Mg     2.0       17 Sep 2022 05:00    TPro  5.4    /  Alb  3.3    /  TBili  0.4    /  DBili  x      /  AST  22     /  ALT  30     /  AlkPhos  104    17 Sep 2022 05:00  Amylase x     lipase x                                                                                                                                                                                                 Mode: AC/ CMV (Assist Control/ Continuous Mandatory Ventilation)  RR (machine): 14  TV (machine): 390  FiO2: 60  PEEP: 5  ITime: 1  MAP: 9  PIP: 23                                      ABG - ( 17 Sep 2022 03:20 )  pH, Arterial: 7.57  pH, Blood: x     /  pCO2: 37    /  pO2: 133   / HCO3: 34    / Base Excess: 11.1  /  SaO2: 100.0               MEDICATIONS  (STANDING):  atorvastatin 20 milliGRAM(s) Oral at bedtime  chlorhexidine 0.12% Liquid 15 milliLiter(s) Oral Mucosa two times a day  chlorhexidine 2% Cloths 1 Application(s) Topical <User Schedule>  dexMEDEtomidine Infusion 0.2 MICROgram(s)/kG/Hr (2.44 mL/Hr) IV Continuous <Continuous>  dextrose 5%. 1000 milliLiter(s) (50 mL/Hr) IV Continuous <Continuous>  dextrose 5%. 1000 milliLiter(s) (100 mL/Hr) IV Continuous <Continuous>  dextrose 50% Injectable 25 Gram(s) IV Push once  dextrose 50% Injectable 12.5 Gram(s) IV Push once  dextrose 50% Injectable 25 Gram(s) IV Push once  doxycycline IVPB 100 milliGRAM(s) IV Intermittent every 12 hours  enoxaparin Injectable 40 milliGRAM(s) SubCutaneous every 24 hours  furosemide   Injectable 20 milliGRAM(s) IV Push two times a day  glucagon  Injectable 1 milliGRAM(s) IntraMuscular once  insulin lispro (ADMELOG) corrective regimen sliding scale   SubCutaneous four times a day with meals  levothyroxine 75 MICROGram(s) Oral daily  mupirocin 2% Ointment 1 Application(s) Topical two times a day  norepinephrine Infusion 0.05 MICROgram(s)/kG/Min (4.57 mL/Hr) IV Continuous <Continuous>  OLANZapine 2.5 milliGRAM(s) Oral at bedtime  potassium chloride  20 mEq/100 mL IVPB 20 milliEquivalent(s) IV Intermittent every 2 hours  senna 2 Tablet(s) Oral at bedtime  sodium phosphate IVPB 15 milliMole(s) IV Intermittent once    MEDICATIONS  (PRN):  acetaminophen     Tablet .. 650 milliGRAM(s) Oral every 6 hours PRN Temp greater or equal to 38C (100.4F), Mild Pain (1 - 3)  dextrose Oral Gel 15 Gram(s) Oral once PRN Blood Glucose LESS THAN 70 milliGRAM(s)/deciliter          Xrays:  TLC:  OG:  ET tube:                                                                                    stable Left lower atelectasis VS effusion    ECHO:  [facility] ICU:

## 2022-09-17 NOTE — PROGRESS NOTE ADULT - ASSESSMENT
Assessment and Plan:   IMPRESSION:    Acute hypercapnic respiratory failure  Acute hypoxemic respiratory failure  THERESA with decreased urine output - resolved   H/o Hypothyroidism  Altered mental status   Acute Toxic Metabolic Encephalopathy  SIRS  LLL opacity/pneumonia    PLAN:    CNS: Following commands; on Precedex (0.3)  - Zyprexa 2.5mg   - SAT      HEENT: Oral care; ETT care      PULMONARY: HOB @ 45 degrees  - Vent Settings: 12|350|40|5  - SBT  - US: for left sided pleural effusion  - Possible bronch prior to extubation?      CARDIOVASCULAR: No IVF.     GI: OGT feeds    Bowel regimen    RENAL: Follow up lytes. Correct as needed. Patrick catheter. Kidney function back to baseline.  - Monitor Urine Output  - Lasix 20 BID, keep even      INFECTIOUS DISEASE: Leukocytosis resolved. UA negative. Cultures negative. COVID PCR positive; Completed Abx  - Leukocytosis downtrending (WBC 12k)  - Repeat COVID?    HEMATOLOGICAL: DVT prophylaxis: Lovenox SQ.     ENDOCRINE: Follow up FS. Insulin protocol if needed. Continue home meds.     MUSCULOSKELETAL: Bed in chair position.  - PT/OT Recommendations    DISPO: Critically ill and needs ICU level of care

## 2022-09-18 NOTE — PHYSICAL THERAPY INITIAL EVALUATION ADULT - PRECAUTIONS/LIMITATIONS, REHAB EVAL
fall precautions/isolation precautions/oxygen therapy device and L/min COVID19 +/fall precautions/isolation precautions/oxygen therapy device and L/min

## 2022-09-18 NOTE — PHYSICAL THERAPY INITIAL EVALUATION ADULT - GENERAL OBSERVATIONS, REHAB EVAL
CCU. [id number] pm. 89 y/o F rec'd/left in semi-Ponce in bed, nad, + intubated, tele, IV, wrist restraints, morris. EMR reviewed, activity orders in place; per RN, pt is on a low dose of Precedex, able to follow some commands. pt moved all 4 extremities spontaneously and with PT's assistance, inconsistently to VC's. briefly became agitated when PT was placing wrist restraints back on, forcibly withdrawing both arms. LT LE was moved gently due to recent LT hip sx (~ Aug'22.) vitals: 109/53 HR 48 100% on vent, RR 36.

## 2022-09-18 NOTE — PROGRESS NOTE ADULT - SUBJECTIVE AND OBJECTIVE BOX
Patient is a 89y old  Female who presents with a chief complaint of AMS, respiratory failure (17 Sep 2022 11:09)      Over Night Events:  Patient seen and examined.   failed weaning trail co2 increased     ROS:  See HPI    PHYSICAL EXAM    ICU Vital Signs Last 24 Hrs  T(C): 35.8 (18 Sep 2022 04:00), Max: 36.7 (17 Sep 2022 12:00)  T(F): 96.5 (18 Sep 2022 04:00), Max: 98 (17 Sep 2022 12:00)  HR: 59 (18 Sep 2022 07:15) (48 - 88)  BP: 126/58 (18 Sep 2022 07:15) (80/42 - 156/67)  BP(mean): 83 (18 Sep 2022 07:15) (57 - 116)  ABP: --  ABP(mean): --  RR: 16 (18 Sep 2022 07:15) (12 - 46)  SpO2: 100% (18 Sep 2022 07:15) (99% - 100%)    O2 Parameters below as of 18 Sep 2022 07:15  Patient On (Oxygen Delivery Method): ventilator            General: awake   HEENT:    et tube             Lymph Nodes: NO cervical LN   Lungs: decrease left lower   Cardiovascular: Regular   Abdomen: Soft, Positive BS  Extremities: No clubbing   Skin: warm   Neurological: no focal   Musculoskeletal: move all ext     I&O's Detail    17 Sep 2022 07:01  -  18 Sep 2022 07:00  --------------------------------------------------------  IN:    Dexmedetomidine: 54.5 mL    Enteral Tube Flush: 320 mL    IV PiggyBack: 100 mL    Miscellaneous Tube Feedin mL    Norepinephrine: 13.5 mL    Norepinephrine: 65.5 mL    Peptamen A.F.: 720 mL  Total IN: 1753.5 mL    OUT:    Indwelling Catheter - Urethral (mL): 1675 mL  Total OUT: 1675 mL    Total NET: 78.5 mL          LABS:                          9.7    9.39  )-----------( 291      ( 18 Sep 2022 04:30 )             29.5         18 Sep 2022 04:30    134    |  93     |  52     ----------------------------<  132    4.0     |  32     |  0.9      Ca    8.6        18 Sep 2022 04:30  Mg     1.8       18 Sep 2022 04:30    TPro  5.2    /  Alb  3.1    /  TBili  0.4    /  DBili  x      /  AST  86     /  ALT  87     /  AlkPhos  175    18 Sep 2022 04:30  Amylase x     lipase x                                                                                                                                                                                                 Mode: AC/ CMV (Assist Control/ Continuous Mandatory Ventilation)  RR (machine): 12  TV (machine): 350  FiO2: 40  PEEP: 5  ITime: 1  MAP: 12  PIP: 26                                      ABG - ( 18 Sep 2022 04:52 )  pH, Arterial: 7.48  pH, Blood: x     /  pCO2: 43    /  pO2: 111   / HCO3: 32    / Base Excess: 7.7   /  SaO2: 99.6                MEDICATIONS  (STANDING):  atorvastatin 20 milliGRAM(s) Oral at bedtime  chlorhexidine 0.12% Liquid 15 milliLiter(s) Oral Mucosa two times a day  chlorhexidine 2% Cloths 1 Application(s) Topical <User Schedule>  dexMEDEtomidine Infusion 0.2 MICROgram(s)/kG/Hr (2.44 mL/Hr) IV Continuous <Continuous>  dextrose 5%. 1000 milliLiter(s) (50 mL/Hr) IV Continuous <Continuous>  dextrose 5%. 1000 milliLiter(s) (100 mL/Hr) IV Continuous <Continuous>  dextrose 50% Injectable 25 Gram(s) IV Push once  dextrose 50% Injectable 12.5 Gram(s) IV Push once  dextrose 50% Injectable 25 Gram(s) IV Push once  doxycycline IVPB 100 milliGRAM(s) IV Intermittent every 12 hours  enoxaparin Injectable 40 milliGRAM(s) SubCutaneous every 24 hours  furosemide   Injectable 20 milliGRAM(s) IV Push two times a day  glucagon  Injectable 1 milliGRAM(s) IntraMuscular once  insulin lispro (ADMELOG) corrective regimen sliding scale   SubCutaneous four times a day with meals  levothyroxine 75 MICROGram(s) Oral daily  mupirocin 2% Ointment 1 Application(s) Topical two times a day  norepinephrine Infusion 0.05 MICROgram(s)/kG/Min (2.28 mL/Hr) IV Continuous <Continuous>  OLANZapine 2.5 milliGRAM(s) Oral at bedtime  senna 2 Tablet(s) Oral at bedtime    MEDICATIONS  (PRN):  acetaminophen     Tablet .. 650 milliGRAM(s) Oral every 6 hours PRN Temp greater or equal to 38C (100.4F), Mild Pain (1 - 3)  dextrose Oral Gel 15 Gram(s) Oral once PRN Blood Glucose LESS THAN 70 milliGRAM(s)/deciliter          Xrays:  TLC:  OG:  ET tube:                                                                                    decrease left slower opacity atelctasis  VS effusion    ECHO:  [facility] ICU:

## 2022-09-18 NOTE — PHYSICAL THERAPY INITIAL EVALUATION ADULT - ACTIVE RANGE OF MOTION EXAMINATION, REHAB EVAL
bilat hips/knees AAROM WDL (pt resists on LT due to recent hip sx); bilat UE's A/AAROM WDL/deficits as listed below

## 2022-09-18 NOTE — PHYSICAL THERAPY INITIAL EVALUATION ADULT - ORIENTATION, REHAB EVAL
pt on low dose Precedex, half-opening eyes, looked at PT, with inconsistent AAROM bilat UE/LE's, resisted LT LE ROM (operated side) and bilat wrist restraints being placed back in

## 2022-09-18 NOTE — PHYSICAL THERAPY INITIAL EVALUATION ADULT - DIAGNOSIS, PT EVAL
Acute respiratory failure with hypoxia, deconditioning Acute respiratory failure with hypoxia, deconditioning; COVID +

## 2022-09-18 NOTE — PHYSICAL THERAPY INITIAL EVALUATION ADULT - NSACTIVITYREC_GEN_A_PT
bed mobility, transfers, sitting and standing balance training, strengthening exercises, pre-gait training to tolerance (vitals and recent LT hip sx.)

## 2022-09-18 NOTE — PHYSICAL THERAPY INITIAL EVALUATION ADULT - IMPAIRMENTS FOUND, PT EVAL
aerobic capacity/endurance/arousal, attention, and cognition/cognitive impairment/muscle strength/ventilation and respiration/gas exchange Normal

## 2022-09-18 NOTE — PROGRESS NOTE ADULT - ASSESSMENT
IMPRESSION:    Acute hypercapnic respiratory failure  Acute hypoxemic respiratory failure  THERESA with decreased urine output - resolved   H/o Hypothyroidism  Altered mental status   Acute Toxic Metabolic Encephalopathy  SIRS  LLL opacity/pneumonia    PLAN:    CNS: Following commands. Zyprexa 2.5mg QHS. SAT    HEENT: Oral care; ETT care    PULMONARY:  TV to 350   RR to 12   - ABG today  - HOB @ 45 degrees.   do weaning trial   ct surgery for trach     CARDIOVASCULAR: No IVF.     GI: OGT feeds    Bowel regimen    RENAL: Follow up lytes. Correct as needed. Patrick catheter. Kidney function back to baseline.  - Monitor Urine Output  - Lasix 20 BID, keep even  replace K   INFECTIOUS DISEASE: Leukocytosis resolved. UA negative. Cultures negative. COVID PCR positive.   - Completed Abx  - Leukocytosis downtrending (WBC 12k)    HEMATOLOGICAL: DVT prophylaxis: Lovenox SQ.     ENDOCRINE: Follow up FS. Insulin protocol if needed. Continue home meds.     MUSCULOSKELETAL: Bed in chair position.  - PT/OT Recommendations    DISPO: Critically ill and needs ICU level of care.

## 2022-09-18 NOTE — PROGRESS NOTE ADULT - ASSESSMENT
Assessment and Plan:   IMPRESSION:    Acute hypercapnic respiratory failure  Acute hypoxemic respiratory failure  THERESA with decreased urine output - resolved   H/o Hypothyroidism  Altered mental status   Acute Toxic Metabolic Encephalopathy  SIRS  LLL opacity/pneumonia    PLAN:    CNS: Following commands. Zyprexa 2.5mg QHS   SAT      HEENT: Oral care; ETT care        PULMONARY:  TV to 350   RR to 12   - ABG today  - HOB @ 45 degrees.   do weaning trial   ct surgery for trach     CARDIOVASCULAR: No IVF.     GI: OGT feeds    Bowel regimen      RENAL: Follow up lytes. Correct as needed. Patrick catheter. Kidney function back to baseline.  - Monitor Urine Output  - Lasix 20 BID, keep even  replace K   INFECTIOUS DISEASE: Leukocytosis resolved. UA negative. Cultures negative. COVID PCR positive.   - Completed Abx  - Leukocytosis downtrending (WBC 12k)    HEMATOLOGICAL: DVT prophylaxis: Lovenox SQ.     ENDOCRINE: Follow up FS. Insulin protocol if needed. Continue home meds.     MUSCULOSKELETAL: Bed in chair position.  - PT/OT Recommendations    DISPO: Critically ill and needs ICU level of care.

## 2022-09-18 NOTE — PROGRESS NOTE ADULT - SUBJECTIVE AND OBJECTIVE BOX
HPI:  89yo F w/ pmhx of HTN, HLD, hypothyroidism, recent left femur fracture s/p surgery 1mo in [facility] presented to ED from [facility] with hypoxia and hypotension. Patient was saturating 50% on room air and improved only minimally on NRB in the ED. History was not able to be obtained by the patient as she was unresponsive and she was intubated for airway protection. Collateral HPI obtained by daughter Becky who said she was sent to the NH for rehab for her hip fracture. She denies any history of dementia. Baseline mental status is AAOx3 and prior to hip fracture patient was living by herself.     Of note patient was being treated in NH for R sided pneumonia (CXR 9/6: R perihilar infiltrate) and was receiving levaquin.     ED course:  vitals: /84, HR 73, hypothermic T 93s, O2 100% on BVM  labs: WBC 14.96, trop 0.04, , ABG pH 7.09, PCO2 93, PO2 51  imaging:   - CXR: wet read: wnl   - CT angio chest PE: no PE, lungs wnl   - CT head: No acute intracranial pathology. No evidence of midline shift, mass effect or intracranial hemorrhage.  given: vanc, cefepime, LR 1800ml  (07 Sep 2022 15:40)      INTERVAL HISTORY:    No acute events overnight    PAST MEDICAL & SURGICAL HISTORY  HTN (hypertension)    High cholesterol    Hypothyroid    History of colon resection    H/O: hysterectomy        ALLERGIES:  penicillin (Unknown)      MEDICATIONS:  MEDICATIONS  (STANDING):  atorvastatin 20 milliGRAM(s) Oral at bedtime  chlorhexidine 0.12% Liquid 15 milliLiter(s) Oral Mucosa two times a day  chlorhexidine 2% Cloths 1 Application(s) Topical <User Schedule>  dexMEDEtomidine Infusion 0.2 MICROgram(s)/kG/Hr (2.44 mL/Hr) IV Continuous <Continuous>  dextrose 5%. 1000 milliLiter(s) (100 mL/Hr) IV Continuous <Continuous>  dextrose 5%. 1000 milliLiter(s) (50 mL/Hr) IV Continuous <Continuous>  dextrose 50% Injectable 25 Gram(s) IV Push once  dextrose 50% Injectable 12.5 Gram(s) IV Push once  dextrose 50% Injectable 25 Gram(s) IV Push once  doxycycline IVPB 100 milliGRAM(s) IV Intermittent every 12 hours  enoxaparin Injectable 40 milliGRAM(s) SubCutaneous every 24 hours  furosemide   Injectable 20 milliGRAM(s) IV Push two times a day  glucagon  Injectable 1 milliGRAM(s) IntraMuscular once  insulin lispro (ADMELOG) corrective regimen sliding scale   SubCutaneous four times a day with meals  levothyroxine 75 MICROGram(s) Oral daily  mupirocin 2% Ointment 1 Application(s) Topical two times a day  norepinephrine Infusion 0.05 MICROgram(s)/kG/Min (2.28 mL/Hr) IV Continuous <Continuous>  OLANZapine 2.5 milliGRAM(s) Oral at bedtime  senna 2 Tablet(s) Oral at bedtime    MEDICATIONS  (PRN):  acetaminophen     Tablet .. 650 milliGRAM(s) Oral every 6 hours PRN Temp greater or equal to 38C (100.4F), Mild Pain (1 - 3)  dextrose Oral Gel 15 Gram(s) Oral once PRN Blood Glucose LESS THAN 70 milliGRAM(s)/deciliter      HOME MEDICATIONS:  Home Medications:  ascorbic acid 500 mg oral tablet: 1 tab(s) orally 2 times a day (07 Sep 2022 15:52)  docusate sodium 100 mg oral tablet: 1 tab(s) orally 2 times a day (07 Sep 2022 15:52)  lactulose 10 g oral powder for reconstitution: 30 milliliter(s) orally 2 times a day, As Needed for constipation (07 Sep 2022 15:52)  Lipitor 20 mg oral tablet: 1 tab(s) orally once a day (07 Sep 2022 15:52)  Melatonin 3 mg oral tablet: 1 tab(s) orally once a day (at bedtime) (07 Sep 2022 15:52)  Multiple Vitamins oral tablet: 1 tab(s) orally once a day (07 Sep 2022 15:52)  Synthroid 75 mcg (0.075 mg) oral tablet: 1 tab(s) orally once a day (07 Sep 2022 15:52)        OBJECTIVE:  ICU Vital Signs Last 24 Hrs  T(C): 37 (18 Sep 2022 16:00), Max: 37 (18 Sep 2022 16:00)  T(F): 98.6 (18 Sep 2022 16:00), Max: 98.6 (18 Sep 2022 16:00)  HR: 45 (18 Sep 2022 18:45) (44 - 109)  BP: 96/55 (18 Sep 2022 18:45) (80/42 - 161/70)  BP(mean): 68 (18 Sep 2022 18:45) (57 - 116)  ABP: --  ABP(mean): --  RR: 18 (18 Sep 2022 18:45) (12 - 46)  SpO2: 100% (18 Sep 2022 18:45) (99% - 100%)    O2 Parameters below as of 18 Sep 2022 16:00  Patient On (Oxygen Delivery Method): ventilator    O2 Concentration (%): 40      Mode: AC/ CMV (Assist Control/ Continuous Mandatory Ventilation)  RR (machine): 12  TV (machine): 350  FiO2: 40  PEEP: 5  ITime: 1  MAP: 12  PIP: 28    Adult Advanced Hemodynamics Last 24 Hrs  CVP(mm Hg): --  CVP(cm H2O): --  CO: --  CI: --  PA: --  PA(mean): --  PCWP: --  SVR: --  SVRI: --  PVR: --  PVRI: --  I&O's Summary    17 Sep 2022 07:01  -  18 Sep 2022 07:00  --------------------------------------------------------  IN: 1753.5 mL / OUT: 1675 mL / NET: 78.5 mL    18 Sep 2022 07:01  -  18 Sep 2022 18:53  --------------------------------------------------------  IN: 84.2 mL / OUT: 1740 mL / NET: -1655.8 mL      Daily     Daily     PHYSICAL EXAM:  NEURO: patient is awake, intubated, alert and confused  GEN: Not in acute distress  NECK: no thyroid enlargement, no JVD  LUNGS: Clear to auscultation bilaterally   CARDIOVASCULAR: S1/S2 present, RRR , no murmurs or rubs, no carotid bruits,  + PP bilaterally  ABD: Soft, non-tender, non-distended, +BS  EXT: No KRISTOPHER  SKIN: Intact    LABS:                        9.7    9.39  )-----------( 291      ( 18 Sep 2022 04:30 )             29.5     09-18    134<L>  |  93<L>  |  52<H>  ----------------------------<  132<H>  4.0   |  32  |  0.9    Ca    8.6      18 Sep 2022 04:30  Mg     1.8     09-18    TPro  5.2<L>  /  Alb  3.1<L>  /  TBili  0.4  /  DBili  x   /  AST  86<H>  /  ALT  87<H>  /  AlkPhos  175<H>  09-18      RADIOLOGY:  -CXR: Left basilar opacity/effusion, unchanged.    ECG:  Sinus bradycardiawith 1st degree A-V block  Left axis deviation  Left bundle branch block    TELEMETRY EVENTS:  No tele events overnight

## 2022-09-19 NOTE — PROGRESS NOTE ADULT - ASSESSMENT
IMPRESSION:    Acute hypercapnic respiratory failure  Acute hypoxemic respiratory failure  THERESA with decreased urine output - resolved   H/o Hypothyroidism  Altered mental status   Acute Toxic Metabolic Encephalopathy  SIRS  LLL opacity/pneumonia    PLAN:    CNS: Following commands. Zyprexa 2.5mg QHS. SAT daily.     HEENT: Oral care; ETT care. CTS eval for possible tach if family agrees.     PULMONARY:  No change in vent settings. CXR reviewed with no new infiltrates; left basilar opacity.     CARDIOVASCULAR: No IVF.     GI: Tolerating feeding. GI prophylaxis.     RENAL: Follow up lytes. Correct as needed. Patrick catheter. Kidney function back to baseline. Keep equal balance.     INFECTIOUS DISEASE: Leukocytosis resolved. UA negative. Cultures negative. COVID PCR positive. DC doxycyline. Completed course.     HEMATOLOGICAL: DVT prophylaxis: Lovenox SQ.     ENDOCRINE: Follow up FS. Insulin protocol if needed. Continue home meds.     MUSCULOSKELETAL: Bed in chair position.s    DISPO: Critically ill and needs ICU level of care. IMPRESSION:    Acute hypercapnic respiratory failure  Acute hypoxemic respiratory failure  THERESA with decreased urine output - resolved   H/o Hypothyroidism  Altered mental status   Acute Toxic Metabolic Encephalopathy  SIRS  LLL opacity/pneumonia    PLAN:    CNS: Following commands. Zyprexa 2.5mg QHS. SAT daily.     HEENT: Oral care; ETT care. CTS eval for possible tach if family agrees.     PULMONARY:  No change in vent settings. CXR reviewed with no new infiltrates; left basilar opacity. She has failed multiple SBTs and was actually extubated and reintubated last week. At this point recommend tracheostomy placement if the family is agreeable to that.     CARDIOVASCULAR:  Keep equal balance. MAP is adequate off pressor agents.     GI: Tolerating feeding. GI prophylaxis.     RENAL: Follow up lytes. Correct as needed. Patrick catheter. Kidney function back to baseline. Keep equal balance.     INFECTIOUS DISEASE: Leukocytosis resolved. UA negative. Cultures negative. COVID PCR positive. DC doxycyline. Completed abx course.     HEMATOLOGICAL: DVT prophylaxis: Lovenox SQ.     ENDOCRINE: Follow up FS. Insulin protocol if needed. Continue home meds.     MUSCULOSKELETAL: Bed in chair position.s    DISPO: Critically ill, back on the ventilator, failed SBTs. She may need tracheostomy placement as well.

## 2022-09-19 NOTE — PROGRESS NOTE ADULT - SUBJECTIVE AND OBJECTIVE BOX
Patient is a 89y old  Female who presents with a chief complaint of AMS, respiratory failure (19 Sep 2022 07:10)        Over Night Events:    Off pressors   On fentanyl for sedation  Extubated thursday and reintubated the same day       ROS:  See HPI    PHYSICAL EXAM    ICU Vital Signs Last 24 Hrs  T(C): 36 (19 Sep 2022 08:00), Max: 37 (18 Sep 2022 16:00)  T(F): 96.8 (19 Sep 2022 08:00), Max: 98.6 (18 Sep 2022 16:00)  HR: 79 (19 Sep 2022 08:00) (44 - 109)  BP: 152/67 (19 Sep 2022 07:00) (90/55 - 183/153)  BP(mean): 96 (19 Sep 2022 07:00) (67 - 163)  ABP: --  ABP(mean): --  RR: 38 (19 Sep 2022 08:00) (12 - 39)  SpO2: 100% (19 Sep 2022 08:00) (100% - 100%)    O2 Parameters below as of 19 Sep 2022 08:00  Patient On (Oxygen Delivery Method): ventilator    O2 Concentration (%): 40        General: ETT  HEENT: MIC             Lymphatic system: No cervical LN   Lungs: Bilateral BS  Cardiovascular: Regular   Gastrointestinal: Soft, Positive BS  Extremities: Moves all ext  Skin: Warm, intact  Neurological: No motor or sensory deficit; agitated       22 @ 07:01  -  22 @ 07:00  --------------------------------------------------------  IN:    Dexmedetomidine: 50.6 mL    FentaNYL: 26.7 mL    Miscellaneous Tube Feedin mL    Norepinephrine: 61.7 mL  Total IN: 859 mL    OUT:    Indwelling Catheter - Urethral (mL): 2785 mL  Total OUT: 2785 mL    Total NET: -1926 mL      22 @ 07:01  -  22 @ 08:18  --------------------------------------------------------  IN:    FentaNYL: 12.2 mL  Total IN: 12.2 mL    OUT:    Indwelling Catheter - Urethral (mL): 150 mL  Total OUT: 150 mL    Total NET: -137.8 mL          LABS:                            10.2   9.00  )-----------( 297      ( 19 Sep 2022 04:35 )             30.9                                                   137  |  93<L>  |  50<H>  ----------------------------<  194<H>  3.4<L>   |  33<H>  |  0.9    Ca    8.8      19 Sep 2022 04:35  Mg     1.6         TPro  5.7<L>  /  Alb  3.5  /  TBili  0.3  /  DBili  x   /  AST  39  /  ALT  63<H>  /  AlkPhos  151<H>                                                                                             LIVER FUNCTIONS - ( 19 Sep 2022 04:35 )  Alb: 3.5 g/dL / Pro: 5.7 g/dL / ALK PHOS: 151 U/L / ALT: 63 U/L / AST: 39 U/L / GGT: x                                                                                               Mode: AC/ CMV (Assist Control/ Continuous Mandatory Ventilation)  RR (machine): 12  TV (machine): 350  FiO2: 40  PEEP: 5  ITime: 1  MAP: 7  PIP: 19                                      ABG - ( 19 Sep 2022 03:14 )  pH, Arterial: 7.44  pH, Blood: x     /  pCO2: 47    /  pO2: 135   / HCO3: 32    / Base Excess: 6.7   /  SaO2: 99.6                MEDICATIONS  (STANDING):  atorvastatin 20 milliGRAM(s) Oral at bedtime  chlorhexidine 0.12% Liquid 15 milliLiter(s) Oral Mucosa two times a day  chlorhexidine 2% Cloths 1 Application(s) Topical <User Schedule>  dexMEDEtomidine Infusion 0.2 MICROgram(s)/kG/Hr (2.44 mL/Hr) IV Continuous <Continuous>  dextrose 5%. 1000 milliLiter(s) (100 mL/Hr) IV Continuous <Continuous>  dextrose 5%. 1000 milliLiter(s) (50 mL/Hr) IV Continuous <Continuous>  dextrose 50% Injectable 25 Gram(s) IV Push once  dextrose 50% Injectable 12.5 Gram(s) IV Push once  dextrose 50% Injectable 25 Gram(s) IV Push once  doxycycline IVPB 100 milliGRAM(s) IV Intermittent every 12 hours  enoxaparin Injectable 40 milliGRAM(s) SubCutaneous every 24 hours  fentaNYL   Infusion. 0.5 MICROgram(s)/kG/Hr (2.44 mL/Hr) IV Continuous <Continuous>  furosemide   Injectable 20 milliGRAM(s) IV Push two times a day  glucagon  Injectable 1 milliGRAM(s) IntraMuscular once  insulin lispro (ADMELOG) corrective regimen sliding scale   SubCutaneous four times a day with meals  levothyroxine 75 MICROGram(s) Oral daily  magnesium sulfate  IVPB 1 Gram(s) IV Intermittent once  mupirocin 2% Ointment 1 Application(s) Topical two times a day  norepinephrine Infusion 0.05 MICROgram(s)/kG/Min (2.28 mL/Hr) IV Continuous <Continuous>  OLANZapine 2.5 milliGRAM(s) Oral at bedtime  potassium chloride  20 mEq/100 mL IVPB 20 milliEquivalent(s) IV Intermittent once  senna 2 Tablet(s) Oral at bedtime    MEDICATIONS  (PRN):  acetaminophen     Tablet .. 650 milliGRAM(s) Oral every 6 hours PRN Temp greater or equal to 38C (100.4F), Mild Pain (1 - 3)  dextrose Oral Gel 15 Gram(s) Oral once PRN Blood Glucose LESS THAN 70 milliGRAM(s)/deciliter      Xrays:                                                                                     ECHO

## 2022-09-19 NOTE — PROGRESS NOTE ADULT - ASSESSMENT
Assessment	  IMPRESSION:    Acute hypercapnic respiratory failure  Acute hypoxemic respiratory failure  THERESA with decreased urine output - resolved   H/o Hypothyroidism  Altered mental status   Acute Toxic Metabolic Encephalopathy  SIRS  LLL opacity/pneumonia    PLAN:    CNS: Following commands. Zyprexa 2.5mg QHS.   - SAT Daily    HEENT: Oral care; ETT care    PULMONARY: HOB @ 45 degrees.  - SBT  - CT Surgery for trach     CARDIOVASCULAR: No IVF.     GI: OGT feeds    Bowel regimen    RENAL: Follow up lytes. Correct as needed. Patrick catheter. Kidney function back to baseline.  - Monitor Urine Output  - Lasix 20 BID, keep even  replace K     INFECTIOUS DISEASE: Leukocytosis resolved. UA negative. Cultures negative. COVID PCR positive.       HEMATOLOGICAL: DVT prophylaxis: Lovenox SQ.     ENDOCRINE: Follow up FS. Insulin protocol if needed. Continue home meds.     MUSCULOSKELETAL: Bed in chair position.  - PT/OT Recommendations: Sub-acute rehab    DISPO: Critically ill and needs ICU level of care.

## 2022-09-19 NOTE — PROGRESS NOTE ADULT - SUBJECTIVE AND OBJECTIVE BOX
HPI:  88yo F w/ pmhx of HTN, HLD, hypothyroidism, recent left femur fracture s/p surgery 1mo in [facility] presented to ED from [facility] with hypoxia and hypotension. Patient was saturating 50% on room air and improved only minimally on NRB in the ED. History was not able to be obtained by the patient as she was unresponsive and she was intubated for airway protection. Collateral HPI obtained by daughter Becky who said she was sent to the NH for rehab for her hip fracture. She denies any history of dementia. Baseline mental status is AAOx3 and prior to hip fracture patient was living by herself.       INTERVAL HISTORY:    - Went down on precedex; currently on Fentanyl 2mcg   - CT Surg: trach either today or tomorrow      PAST MEDICAL & SURGICAL HISTORY  HTN (hypertension)    High cholesterol    Hypothyroid    History of colon resection    H/O: hysterectomy        ALLERGIES:  penicillin (Unknown)      MEDICATIONS:  MEDICATIONS  (STANDING):  atorvastatin 20 milliGRAM(s) Oral at bedtime  chlorhexidine 0.12% Liquid 15 milliLiter(s) Oral Mucosa two times a day  chlorhexidine 2% Cloths 1 Application(s) Topical <User Schedule>  dexMEDEtomidine Infusion 0.2 MICROgram(s)/kG/Hr (2.44 mL/Hr) IV Continuous <Continuous>  dextrose 5%. 1000 milliLiter(s) (100 mL/Hr) IV Continuous <Continuous>  dextrose 5%. 1000 milliLiter(s) (50 mL/Hr) IV Continuous <Continuous>  dextrose 50% Injectable 25 Gram(s) IV Push once  dextrose 50% Injectable 12.5 Gram(s) IV Push once  dextrose 50% Injectable 25 Gram(s) IV Push once  doxycycline IVPB 100 milliGRAM(s) IV Intermittent every 12 hours  enoxaparin Injectable 40 milliGRAM(s) SubCutaneous every 24 hours  fentaNYL   Infusion. 0.5 MICROgram(s)/kG/Hr (2.44 mL/Hr) IV Continuous <Continuous>  furosemide   Injectable 20 milliGRAM(s) IV Push two times a day  glucagon  Injectable 1 milliGRAM(s) IntraMuscular once  insulin lispro (ADMELOG) corrective regimen sliding scale   SubCutaneous four times a day with meals  levothyroxine 75 MICROGram(s) Oral daily  mupirocin 2% Ointment 1 Application(s) Topical two times a day  norepinephrine Infusion 0.05 MICROgram(s)/kG/Min (2.28 mL/Hr) IV Continuous <Continuous>  OLANZapine 2.5 milliGRAM(s) Oral at bedtime  senna 2 Tablet(s) Oral at bedtime    MEDICATIONS  (PRN):  acetaminophen     Tablet .. 650 milliGRAM(s) Oral every 6 hours PRN Temp greater or equal to 38C (100.4F), Mild Pain (1 - 3)  dextrose Oral Gel 15 Gram(s) Oral once PRN Blood Glucose LESS THAN 70 milliGRAM(s)/deciliter      HOME MEDICATIONS:  Home Medications:  ascorbic acid 500 mg oral tablet: 1 tab(s) orally 2 times a day (07 Sep 2022 15:52)  docusate sodium 100 mg oral tablet: 1 tab(s) orally 2 times a day (07 Sep 2022 15:52)  lactulose 10 g oral powder for reconstitution: 30 milliliter(s) orally 2 times a day, As Needed for constipation (07 Sep 2022 15:52)  Lipitor 20 mg oral tablet: 1 tab(s) orally once a day (07 Sep 2022 15:52)  Melatonin 3 mg oral tablet: 1 tab(s) orally once a day (at bedtime) (07 Sep 2022 15:52)  Multiple Vitamins oral tablet: 1 tab(s) orally once a day (07 Sep 2022 15:52)  Synthroid 75 mcg (0.075 mg) oral tablet: 1 tab(s) orally once a day (07 Sep 2022 15:52)        OBJECTIVE:  ICU Vital Signs Last 24 Hrs  T(C): 36.3 (19 Sep 2022 04:00), Max: 37 (18 Sep 2022 16:00)  T(F): 97.4 (19 Sep 2022 04:00), Max: 98.6 (18 Sep 2022 16:00)  HR: 78 (19 Sep 2022 06:30) (44 - 109)  BP: 168/70 (19 Sep 2022 06:30) (90/55 - 183/153)  BP(mean): 100 (19 Sep 2022 06:30) (67 - 163)  ABP: --  ABP(mean): --  RR: 25 (19 Sep 2022 06:30) (12 - 39)  SpO2: 100% (19 Sep 2022 06:30) (100% - 100%)    O2 Parameters below as of 19 Sep 2022 06:30  Patient On (Oxygen Delivery Method): ventilator    O2 Concentration (%): 40      Mode: AC/ CMV (Assist Control/ Continuous Mandatory Ventilation)  RR (machine): 12  TV (machine): 350  FiO2: 40  PEEP: 5  ITime: 1  MAP: 7  PIP: 19      18 Sep 2022 07:01  -  19 Sep 2022 07:00  --------------------------------------------------------  IN: 859 mL / OUT: 2785 mL / NET: -1926 mL      Daily     Daily Weight in k.7 (19 Sep 2022 06:00)    PHYSICAL EXAM:  NEURO: patient is intubated awake, alert and anxious  GEN: Not in acute distress  NECK: no thyroid enlargement, no JVD  LUNGS: Clear to auscultation bilaterally   CARDIOVASCULAR: S1/S2 present, RRR , no murmurs or rubs, no carotid bruits,  + PP bilaterally  ABD: Soft, non-tender, non-distended, +BS  EXT: No KRISTOPHER  SKIN: Intact      LABS:                        10.2   9.00  )-----------( 297      ( 19 Sep 2022 04:35 )             30.9         137  |  93<L>  |  50<H>  ----------------------------<  194<H>  3.4<L>   |  33<H>  |  0.9    Ca    8.8      19 Sep 2022 04:35  Mg     1.6         TPro  5.7<L>  /  Alb  3.5  /  TBili  0.3  /  DBili  x   /  AST  39  /  ALT  63<H>  /  AlkPhos  151<H>          RADIOLOGY:    ECG:    TELEMETRY EVENTS:

## 2022-09-20 NOTE — OCCUPATIONAL THERAPY INITIAL EVALUATION ADULT - SPECIFY REASON(S)
Multiple attempts made for OT eval. Pt remains intubated and sedated. Please reconsult when appropriate
Pt remains intubated and sedated. OT to cont when appropriate.
Pt remains intubated. OT to cont when appropriate.
Pt remains intubated and sedated. OT to cont when appropriate.
Attempted to see pt for OT evaluation, pt intubated, discussed with medical resident, OT order was put in anticipation of pt being extubated, will hold today and follow up when medically cleared

## 2022-09-20 NOTE — CONSULT NOTE ADULT - PROBLEM SELECTOR RECOMMENDATION 3
S/P hip fx with repair at [facility], was receiving rehab when she fell ill with pneumonia and respiratory failure. Full code  Ongoing GOC discussions w/ daughters   Palliative care meeting 9/23 10:30am

## 2022-09-20 NOTE — CONSULT NOTE ADULT - SUBJECTIVE AND OBJECTIVE BOX
HPI:  89yo F w/ pmhx of HTN, HLD, hypothyroidism, recent left femur fracture s/p surgery 1mo in [facility] presented to ED from [facility] with hypoxia and hypotension. Patient was saturating 50% on room air and improved only minimally on NRB in the ED. History was not able to be obtained by the patient as she was unresponsive and she was intubated for airway protection. Collateral HPI obtained by daughter Becky who said she was sent to the NH for rehab for her hip fracture. She denies any history of dementia. Baseline mental status is AAOx3 and prior to hip fracture patient was living by herself.     Of note patient was being treated in NH for R sided pneumonia (CXR 9/6: R perihilar infiltrate) and was receiving levaquin.     ED course:  vitals: /84, HR 73, hypothermic T 93s, O2 100% on BVM  labs: WBC 14.96, trop 0.04, , ABG pH 7.09, PCO2 93, PO2 51  imaging:   - CXR: wet read: wnl   - CT angio chest PE: no PE, lungs wnl   - CT head: No acute intracranial pathology. No evidence of midline shift, mass effect or intracranial hemorrhage.  given: vanc, cefepime, LR 1800ml  (07 Sep 2022 15:40)    PERTINENT PM/SXH:   HTN (hypertension)    High cholesterol    Hypothyroid      History of colon resection    H/O: hysterectomy      FAMILY HISTORY:    ITEMS NOT CHECKED ARE NOT PRESENT    SOCIAL HISTORY:   Significant other/partner[ ]  Children[x ]  Yazidism/Spirituality:  Substance hx:  [ ]   Tobacco hx:  [ ]   Alcohol hx: [ ]   Living Situation: [ ]Home  [ ]Long term care  [x ]Rehab [ ]Other  Home Services: [ ] HHA [ ] Jamel RN [ ] Hospice  Occupation:  Home Opioid hx:  [ ] Y [ ] N [ ] I-Stop Reference No:    ADVANCE DIRECTIVES:    DNR  MOLST  [ ]  Living Will  [ ]   DECISION MAKER(s):  [ ] Health Care Proxy(s)  [x ] Surrogate(s)  [ ] Guardian           Name(s): Phone Number(s):  Matilde Nancy Mena   BASELINE (I)ADL(s) (prior to admission):  [place]: [ ]Total  [xx ] Moderate [ ]Dependent  Palliative Performance Status Version 2:         %    http://npcrc.org/files/news/palliative_performance_scale_ppsv2.pdf    Allergies    penicillin (Unknown)    Intolerances    MEDICATIONS  (STANDING):  atorvastatin 20 milliGRAM(s) Oral at bedtime  chlorhexidine 0.12% Liquid 15 milliLiter(s) Oral Mucosa two times a day  chlorhexidine 2% Cloths 1 Application(s) Topical <User Schedule>  dexMEDEtomidine Infusion 0.2 MICROgram(s)/kG/Hr (2.44 mL/Hr) IV Continuous <Continuous>  dextrose 5%. 1000 milliLiter(s) (100 mL/Hr) IV Continuous <Continuous>  dextrose 5%. 1000 milliLiter(s) (50 mL/Hr) IV Continuous <Continuous>  dextrose 50% Injectable 25 Gram(s) IV Push once  dextrose 50% Injectable 12.5 Gram(s) IV Push once  dextrose 50% Injectable 25 Gram(s) IV Push once  enoxaparin Injectable 40 milliGRAM(s) SubCutaneous every 24 hours  fentaNYL   Infusion. 0.5 MICROgram(s)/kG/Hr (2.44 mL/Hr) IV Continuous <Continuous>  glucagon  Injectable 1 milliGRAM(s) IntraMuscular once  insulin lispro (ADMELOG) corrective regimen sliding scale   SubCutaneous four times a day with meals  levothyroxine 75 MICROGram(s) Oral daily  mupirocin 2% Ointment 1 Application(s) Topical two times a day  norepinephrine Infusion 0.05 MICROgram(s)/kG/Min (2.28 mL/Hr) IV Continuous <Continuous>  OLANZapine 2.5 milliGRAM(s) Oral at bedtime  pantoprazole   Suspension 40 milliGRAM(s) Oral daily  senna 2 Tablet(s) Oral at bedtime    MEDICATIONS  (PRN):  acetaminophen     Tablet .. 650 milliGRAM(s) Oral every 6 hours PRN Temp greater or equal to 38C (100.4F), Mild Pain (1 - 3)  dextrose Oral Gel 15 Gram(s) Oral once PRN Blood Glucose LESS THAN 70 milliGRAM(s)/deciliter    PRESENT SYMPTOMS: [ ]Unable to obtain due to poor mentation   Source if other than patient:  [ ]Family   [ ]Team     Pain: [ ]yes [ ]no  QOL impact -   Location -                    Aggravating factors -  Quality -  Radiation -  Timing-  Severity (0-10 scale):  Minimal acceptable level (0-10 scale):     CPOT:    https://www.sccm.org/getattachment/lbp52l42-7d6u-3o2r-5r2f-6602u8908f6t/Critical-Care-Pain-Observation-Tool-(CPOT)      PAIN AD Score:     [email or web address]://[email or web address]/painad.pdf (press ctrl +  left click to view)    Dyspnea:                           [ ]Mild [ ]Moderate [ ]Severe  Anxiety:                             [ ]Mild [ ]Moderate [ ]Severe  Fatigue:                             [ ]Mild [ ]Moderate [ ]Severe  Nausea:                             [ ]Mild [ ]Moderate [ ]Severe  Loss of appetite:              [ ]Mild [ ]Moderate [ ]Severe  Constipation:                    [ ]Mild [ ]Moderate [ ]Severe    Other Symptoms:  [ ]All other review of systems negative     Palliative Performance Status Version 2:         %    http://[facility].org/files/news/palliative_performance_scale_ppsv2.pdf  PHYSICAL EXAM:  Vital Signs Last 24 Hrs  T(C): 36.8 (20 Sep 2022 12:00), Max: 36.8 (20 Sep 2022 08:00)  T(F): 98.3 (20 Sep 2022 12:00), Max: 98.3 (20 Sep 2022 08:00)  HR: 98 (20 Sep 2022 11:00) (48 - 129)  BP: 87/43 (20 Sep 2022 11:00) (73/38 - 187/87)  BP(mean): 62 (20 Sep 2022 11:00) (51 - 135)  RR: 45 (20 Sep 2022 11:00) (23 - 50)  SpO2: 100% (20 Sep 2022 11:00) (98% - 100%)    Parameters below as of 20 Sep 2022 11:00  Patient On (Oxygen Delivery Method): ventilator    O2 Concentration (%): 40 I&O's Summary    19 Sep 2022 07:01  -  20 Sep 2022 07:00  --------------------------------------------------------x  IN: 1971.7 mL / OUT: 1555 mL / NET: 416.7 mLx    20 Sep 2022 07:01  -  20 Sep 2022 14:14  --------------------------------------------------------  IN: 211 mL / OUT: 520 mL / NET: -309 mL      GENERAL:  [ ]Alert  [ ]Oriented x   [ ]Lethargic  [ ]Cachexia  [ ]Unarousable  [ ]Verbal  [ ]Non-Verbal  Behavioral:   [ ] Anxiety  [ ] Delirium [ ] Agitation [ ] Other  HEENT:  [ ]Normal   [ ]Dry mouth   [x ]ET Tube/Trach  [ ]Oral lesions  PULMONARY:   [ ]Clear [x ]Tachypnea  [ ]Audible excessive secretions   [ ]Rhonchi        [ ]Right [ ]Left [ ]Bilateral  [ ]Crackles        [ ]Right [ ]Left [ ]Bilateral  [ ]Wheezing     [ ]Right [ ]Left [ ]Bilateral  [ ]Diminished breath sounds [ ]right [ ]left [ ]bilateral  CARDIOVASCULAR:    [x ]Regular [ ]Irregular [ ]Tachy  [ ]Favian [ ]Murmur [ ]Other  GASTROINTESTINAL:  [ ]Soft  [ ]Distended   [ ]+BS  [ ]Non tender [ ]Tender  [ ]PEG xOGT/ NGT  Last BM:   GENITOURINARY:  [ ]Normal [ ] Incontinent   [ ]Oliguria/Anuria   [ x]Patrick  MUSCULOSKELETAL:   [x ]Normal   [ ]Weakness  [ ]Bed/Wheelchair bound [ ]Edema  NEUROLOGIC: unlatoriable to assess - sedated on ventilator  [ ]No focal deficits  [ ]Cognitive impairment  [ ]Dysphagia [ ]Dysarthria [ ]Paresis [ ]Other   SKIN:   [ ]Normal    [ ]Rash  [ ]Pressure ulcer(s)       Present on admission [ ]y [ ]n    CRITICAL CARE:  [ ] Shock Present  [ x]Septic [ ]Cardiogenic [ ]Neurologic [ ]Hypovolemic  [ ]  Vasopressors [ ]  Inotropes   [x ]Respiratory failure present [ x]Mechanical ventilation [ ]Non-invasive ventilatory support [ ]High flow  [ ]Acute  [ ]Chronic [ ]Hypoxic  [ ]Hypercarbic [ ]Other  [ ]Other organ failure     LABS:                        9.5    8.14  )-----------( 323      ( 20 Sep 2022 04:45 )             30.0   09-20    137  |  95<L>  |  50<H>  ----------------------------<  143<H>  3.9   |  34<H>  |  0.8    Ca    8.7      20 Sep 2022 04:45  Mg     1.9     09-20    TPro  5.2<L>  /  Alb  3.2<L>  /  TBili  0.3  /  DBili  x   /  AST  23  /  ALT  42<H>  /  AlkPhos  120<H>  09-20        RADIOLOGY & ADDITIONAL STUDIES:    PROTEIN CALORIE MALNUTRITION PRESENT: [ ]mild [ ]moderate [ ]severe [ ]underweight [ ]morbid obesity  https://www.andeal.org/vault/2440/web/files/ONC/Table_Clinical%20Characteristics%20to%20Document%20Malnutrition-White%20JV%20et%20al%202012.pdf    Height (cm): 152.4 (09-08-22 @ 05:00)  Weight (kg): 48.7 (09-08-22 @ 05:00)  BMI (kg/m2): 21 (09-08-22 @ 05:00)    [ ]PPSV2 < or = to 30% [ ]significant weight loss  [ ]poor nutritional intake  [ ]anasarca      [ ]Artificial Nutrition      REFERRALS:   [ ]Chaplaincy  [ ]Hospice  [ ]Child Life  [ x]Social Work  [x ]Case management [ ]Holistic Therapy     Goals of Care Document:          HPI:  88yo F w/ pmhx of HTN, HLD, hypothyroidism, recent left femur fracture s/p surgery 1mo in [facility] presented to ED from [facility] with hypoxia and hypotension. Patient was saturating 50% on room air and improved only minimally on NRB in the ED. History was not able to be obtained by the patient as she was unresponsive and she was intubated for airway protection. Collateral HPI obtained by daughter Becky who said she was sent to the NH for rehab for her hip fracture. She denies any history of dementia. Baseline mental status is AAOx3 and prior to hip fracture patient was living by herself.  (07 Sep 2022 15:40)    PERTINENT PM/SXH:   HTN (hypertension)    High cholesterol    Hypothyroid      History of colon resection    H/O: hysterectomy      FAMILY HISTORY: cannot obtain from pt    ITEMS NOT CHECKED ARE NOT PRESENT    SOCIAL HISTORY:   Significant other/partner[ ]  Children[x ]  Hinduism/Spirituality:  Substance hx:  [ ]   Tobacco hx:  [ ]   Alcohol hx: [ ]   Living Situation: [ ]Home  [ ]Long term care  [x ]Rehab [ ]Other  Home Services: [ ] HHA [ ] Visting RN [ ] Hospice  Occupation:  Home Opioid hx:  [ ] Y [ ] N [ ] I-Stop Reference No:    ADVANCE DIRECTIVES:    DNR  MOLST  [ ]  Living Will  [ ]   DECISION MAKER(s):  [ ] Health Care Proxy(s)  [x ] Surrogate(s)  [ ] Guardian           Name(s): Phone Number(s):  Matilde Kim Mena   BASELINE (I)ADL(s) (prior to admission):  [place]: [ ]Total  [xx ] Moderate [ ]Dependent  Palliative Performance Status Version 2:         %    http://npcrc.org/files/news/palliative_performance_scale_ppsv2.pdf    Allergies    penicillin (Unknown)    Intolerances    MEDICATIONS  (STANDING):  atorvastatin 20 milliGRAM(s) Oral at bedtime  chlorhexidine 0.12% Liquid 15 milliLiter(s) Oral Mucosa two times a day  chlorhexidine 2% Cloths 1 Application(s) Topical <User Schedule>  dexMEDEtomidine Infusion 0.2 MICROgram(s)/kG/Hr (2.44 mL/Hr) IV Continuous <Continuous>  dextrose 5%. 1000 milliLiter(s) (100 mL/Hr) IV Continuous <Continuous>  dextrose 5%. 1000 milliLiter(s) (50 mL/Hr) IV Continuous <Continuous>  dextrose 50% Injectable 25 Gram(s) IV Push once  dextrose 50% Injectable 12.5 Gram(s) IV Push once  dextrose 50% Injectable 25 Gram(s) IV Push once  enoxaparin Injectable 40 milliGRAM(s) SubCutaneous every 24 hours  fentaNYL   Infusion. 0.5 MICROgram(s)/kG/Hr (2.44 mL/Hr) IV Continuous <Continuous>  glucagon  Injectable 1 milliGRAM(s) IntraMuscular once  insulin lispro (ADMELOG) corrective regimen sliding scale   SubCutaneous four times a day with meals  levothyroxine 75 MICROGram(s) Oral daily  mupirocin 2% Ointment 1 Application(s) Topical two times a day  norepinephrine Infusion 0.05 MICROgram(s)/kG/Min (2.28 mL/Hr) IV Continuous <Continuous>  OLANZapine 2.5 milliGRAM(s) Oral at bedtime  pantoprazole   Suspension 40 milliGRAM(s) Oral daily  senna 2 Tablet(s) Oral at bedtime    MEDICATIONS  (PRN):  acetaminophen     Tablet .. 650 milliGRAM(s) Oral every 6 hours PRN Temp greater or equal to 38C (100.4F), Mild Pain (1 - 3)  dextrose Oral Gel 15 Gram(s) Oral once PRN Blood Glucose LESS THAN 70 milliGRAM(s)/deciliter    PRESENT SYMPTOMS: [ ]Unable to obtain due to poor mentation   Source if other than patient:  [ ]Family   [ ]Team     Pain: [ ]yes [ ]no  QOL impact -   Location -                    Aggravating factors -  Quality -  Radiation -  Timing-  Severity (0-10 scale):  Minimal acceptable level (0-10 scale):     CPOT:    https://www.sccm.org/getattachment/fwd20i25-4j0l-5i2g-8y7a-0931l9698a9d/Critical-Care-Pain-Observation-Tool-(CPOT)      PAIN AD Score:     http://geriatrictoolkit.[place].[email or web address]/cog/painad.pdf (press ctrl +  left click to view)    Dyspnea:                           [ ]Mild [ ]Moderate [ ]Severe  Anxiety:                             [ ]Mild [ ]Moderate [ ]Severe  Fatigue:                             [ ]Mild [ ]Moderate [ ]Severe  Nausea:                             [ ]Mild [ ]Moderate [ ]Severe  Loss of appetite:              [ ]Mild [ ]Moderate [ ]Severe  Constipation:                    [ ]Mild [ ]Moderate [ ]Severe    Other Symptoms:  [ ]All other review of systems negative     Palliative Performance Status Version 2:         %    http://npcrc.org/files/news/palliative_performance_scale_ppsv2.pdf  PHYSICAL EXAM:  Vital Signs Last 24 Hrs  T(C): 36.8 (20 Sep 2022 12:00), Max: 36.8 (20 Sep 2022 08:00)  T(F): 98.3 (20 Sep 2022 12:00), Max: 98.3 (20 Sep 2022 08:00)  HR: 98 (20 Sep 2022 11:00) (48 - 129)  BP: 87/43 (20 Sep 2022 11:00) (73/38 - 187/87)  BP(mean): 62 (20 Sep 2022 11:00) (51 - 135)  RR: 45 (20 Sep 2022 11:00) (23 - 50)  SpO2: 100% (20 Sep 2022 11:00) (98% - 100%)    Parameters below as of 20 Sep 2022 11:00  Patient On (Oxygen Delivery Method): ventilator    O2 Concentration (%): 40 I&O's Summary    19 Sep 2022 07:01  -  20 Sep 2022 07:00  --------------------------------------------------------x  IN: 1971.7 mL / OUT: 1555 mL / NET: 416.7 mLx    20 Sep 2022 07:01  -  20 Sep 2022 14:14  --------------------------------------------------------  IN: 211 mL / OUT: 520 mL / NET: -309 mL      GENERAL:  [ ]Alert  [ ]Oriented x   [ ]Lethargic  [ ]Cachexia  [ ]Unarousable  [ ]Verbal  [ ]Non-Verbal  Behavioral:   [ ] Anxiety  [ ] Delirium [ ] Agitation [ ] Other  HEENT:  [ ]Normal   [ ]Dry mouth   [x ]ET Tube/Trach  [ ]Oral lesions  PULMONARY:   [ ]Clear [x ]Tachypnea  [ ]Audible excessive secretions   [ ]Rhonchi        [ ]Right [ ]Left [ ]Bilateral  [ ]Crackles        [ ]Right [ ]Left [ ]Bilateral  [ ]Wheezing     [ ]Right [ ]Left [ ]Bilateral  [ ]Diminished breath sounds [ ]right [ ]left [ ]bilateral  CARDIOVASCULAR:    [x ]Regular [ ]Irregular [ ]Tachy  [ ]Favian [ ]Murmur [ ]Other  GASTROINTESTINAL:  [ ]Soft  [ ]Distended   [ ]+BS  [ ]Non tender [ ]Tender  [ ]PEG xOGT/ NGT  Last BM:   GENITOURINARY:  [ ]Normal [ ] Incontinent   [ ]Oliguria/Anuria   [ x]Patrick  MUSCULOSKELETAL:   [x ]Normal   [ ]Weakness  [ ]Bed/Wheelchair bound [ ]Edema  NEUROLOGIC: unlatoriable to assess - sedated on ventilator  [ ]No focal deficits  [ ]Cognitive impairment  [ ]Dysphagia [ ]Dysarthria [ ]Paresis [ ]Other   SKIN:   [ ]Normal    [ ]Rash  [ ]Pressure ulcer(s)       Present on admission [ ]y [ ]n    CRITICAL CARE:  [ ] Shock Present  [ x]Septic [ ]Cardiogenic [ ]Neurologic [ ]Hypovolemic  [ ]  Vasopressors [ ]  Inotropes   [x ]Respiratory failure present [ x]Mechanical ventilation [ ]Non-invasive ventilatory support [ ]High flow  [ ]Acute  [ ]Chronic [ ]Hypoxic  [ ]Hypercarbic [ ]Other  [ ]Other organ failure     LABS: reviewed                        9.5    8.14  )-----------( 323      ( 20 Sep 2022 04:45 )             30.0   09-20    137  |  95<L>  |  50<H>  ----------------------------<  143<H>  3.9   |  34<H>  |  0.8    Ca    8.7      20 Sep 2022 04:45  Mg     1.9     09-20    TPro  5.2<L>  /  Alb  3.2<L>  /  TBili  0.3  /  DBili  x   /  AST  23  /  ALT  42<H>  /  AlkPhos  120<H>  09-20        RADIOLOGY & ADDITIONAL STUDIES:  CXR 9/20/22  Stable left basilar opacity.    PROTEIN CALORIE MALNUTRITION PRESENT: [ ]mild [ ]moderate [ ]severe [ ]underweight [ ]morbid obesity  https://www.andeal.org/vault/2440/web/files/ONC/Table_Clinical%20Characteristics%20to%20Document%20Malnutrition-White%20JV%20et%20al%202012.pdf    Height (cm): 152.4 (09-08-22 @ 05:00)  Weight (kg): 48.7 (09-08-22 @ 05:00)  BMI (kg/m2): 21 (09-08-22 @ 05:00)    [ ]PPSV2 < or = to 30% [ ]significant weight loss  [ ]poor nutritional intake  [ ]anasarca      [ ]Artificial Nutrition      REFERRALS:   [ ]Chaplaincy  [ ]Hospice  [ ]Child Life  [ x]Social Work  [x ]Case management [ ]Holistic Therapy     Goals of Care Document:

## 2022-09-20 NOTE — CONSULT NOTE ADULT - ASSESSMENT
89yFemale being evaluated for goals of care  and symptom management. Vented sedated and restrained       MEDD (morphine equivalent daily dose):      See Recs below.    Please call [phone] with questions or concerns 24/7.   We will continue to follow.

## 2022-09-20 NOTE — PROGRESS NOTE ADULT - SUBJECTIVE AND OBJECTIVE BOX
HPI:  89yo F w/ pmhx of HTN, HLD, hypothyroidism, recent left femur fracture s/p surgery 1mo in [facility] presented to ED from [facility] with hypoxia and hypotension. Patient was saturating 50% on room air and improved only minimally on NRB in the ED. History was not able to be obtained by the patient as she was unresponsive and she was intubated for airway protection. Collateral HPI obtained by daughter Becky who said she was sent to the NH for rehab for her hip fracture. She denies any history of dementia. Baseline mental status is AAOx3 and prior to hip fracture patient was living by herself.       INTERVAL HISTORY:    Failed SBT again today (low TV; high RSBI)  Hypotensive on Levophed this morning  Palliative Consulted  Pending family decision for trach placement      PAST MEDICAL & SURGICAL HISTORY  HTN (hypertension)    High cholesterol    Hypothyroid    History of colon resection    H/O: hysterectomy        ALLERGIES:  penicillin (Unknown)      MEDICATIONS:  MEDICATIONS  (STANDING):  atorvastatin 20 milliGRAM(s) Oral at bedtime  chlorhexidine 0.12% Liquid 15 milliLiter(s) Oral Mucosa two times a day  chlorhexidine 2% Cloths 1 Application(s) Topical <User Schedule>  dexMEDEtomidine Infusion 0.2 MICROgram(s)/kG/Hr (2.44 mL/Hr) IV Continuous <Continuous>  dextrose 5%. 1000 milliLiter(s) (50 mL/Hr) IV Continuous <Continuous>  dextrose 5%. 1000 milliLiter(s) (100 mL/Hr) IV Continuous <Continuous>  dextrose 50% Injectable 12.5 Gram(s) IV Push once  dextrose 50% Injectable 25 Gram(s) IV Push once  dextrose 50% Injectable 25 Gram(s) IV Push once  enoxaparin Injectable 40 milliGRAM(s) SubCutaneous every 24 hours  fentaNYL   Infusion. 0.5 MICROgram(s)/kG/Hr (2.44 mL/Hr) IV Continuous <Continuous>  glucagon  Injectable 1 milliGRAM(s) IntraMuscular once  insulin lispro (ADMELOG) corrective regimen sliding scale   SubCutaneous four times a day with meals  lactated ringers Bolus 250 milliLiter(s) IV Bolus every 15 minutes  levothyroxine 75 MICROGram(s) Oral daily  mupirocin 2% Ointment 1 Application(s) Topical two times a day  norepinephrine Infusion 0.05 MICROgram(s)/kG/Min (2.28 mL/Hr) IV Continuous <Continuous>  OLANZapine 2.5 milliGRAM(s) Oral at bedtime  pantoprazole   Suspension 40 milliGRAM(s) Oral daily  senna 2 Tablet(s) Oral at bedtime    MEDICATIONS  (PRN):  acetaminophen     Tablet .. 650 milliGRAM(s) Oral every 6 hours PRN Temp greater or equal to 38C (100.4F), Mild Pain (1 - 3)  dextrose Oral Gel 15 Gram(s) Oral once PRN Blood Glucose LESS THAN 70 milliGRAM(s)/deciliter      HOME MEDICATIONS:  Home Medications:  ascorbic acid 500 mg oral tablet: 1 tab(s) orally 2 times a day (07 Sep 2022 15:52)  docusate sodium 100 mg oral tablet: 1 tab(s) orally 2 times a day (07 Sep 2022 15:52)  lactulose 10 g oral powder for reconstitution: 30 milliliter(s) orally 2 times a day, As Needed for constipation (07 Sep 2022 15:52)  Lipitor 20 mg oral tablet: 1 tab(s) orally once a day (07 Sep 2022 15:52)  Melatonin 3 mg oral tablet: 1 tab(s) orally once a day (at bedtime) (07 Sep 2022 15:52)  Multiple Vitamins oral tablet: 1 tab(s) orally once a day (07 Sep 2022 15:52)  Synthroid 75 mcg (0.075 mg) oral tablet: 1 tab(s) orally once a day (07 Sep 2022 15:52)        OBJECTIVE:  ICU Vital Signs Last 24 Hrs  T(C): 36.8 (20 Sep 2022 08:00), Max: 36.8 (20 Sep 2022 08:00)  T(F): 98.3 (20 Sep 2022 08:00), Max: 98.3 (20 Sep 2022 08:00)  HR: 55 (20 Sep 2022 09:00) (48 - 129)  BP: 98/49 (20 Sep 2022 09:00) (73/38 - 187/87)  BP(mean): 71 (20 Sep 2022 09:00) (51 - 135)  ABP: --  ABP(mean): --  RR: 45 (20 Sep 2022 09:00) (23 - 50)  SpO2: 100% (20 Sep 2022 09:00) (98% - 100%)    O2 Parameters below as of 20 Sep 2022 09:00  Patient On (Oxygen Delivery Method): ventilator    O2 Concentration (%): 40      Mode: AC/ CMV (Assist Control/ Continuous Mandatory Ventilation)  RR (machine): 12  TV (machine): 350  FiO2: 40  PEEP: 5  ITime: 1  MAP: 8  PIP: 19      19 Sep 2022 07:01  -  20 Sep 2022 07:00  --------------------------------------------------------  IN: 1971.7 mL / OUT: 1555 mL / NET: 416.7 mL    20 Sep 2022 07:01  -  20 Sep 2022 10:14  --------------------------------------------------------  IN: 107 mL / OUT: 325 mL / NET: -218 mL      Daily     Daily     PHYSICAL EXAM:  NEURO: patient is awake , alert and oriented  GEN: Not in acute distress  NECK: no thyroid enlargement, no JVD  LUNGS: Clear to auscultation bilaterally   CARDIOVASCULAR: S1/S2 present, RRR , no murmurs or rubs, no carotid bruits,  + PP bilaterally  ABD: Soft, non-tender, non-distended, +BS  EXT: No KRISTOPHER  SKIN: Intact  ACCESS Site:    LABS:                        9.5    8.14  )-----------( 323      ( 20 Sep 2022 04:45 )             30.0     09-20    137  |  95<L>  |  50<H>  ----------------------------<  143<H>  3.9   |  34<H>  |  0.8    Ca    8.7      20 Sep 2022 04:45  Mg     1.9     09-20    TPro  5.2<L>  /  Alb  3.2<L>  /  TBili  0.3  /  DBili  x   /  AST  23  /  ALT  42<H>  /  AlkPhos  120<H>  09-20          RADIOLOGY:  CXR: Stable left basilar opacity.    ECG: Sinus bradycardia with 1st degree A-V block; Left axis deviation; Left bundle branch block    TELEMETRY EVENTS:       HPI:  88yo F w/ pmhx of HTN, HLD, hypothyroidism, recent left femur fracture s/p surgery 1mo in [facility] presented to ED from [facility] with hypoxia and hypotension. Patient was saturating 50% on room air and improved only minimally on NRB in the ED. History was not able to be obtained by the patient as she was unresponsive and she was intubated for airway protection. Collateral HPI obtained by daughter Becky who said she was sent to the NH for rehab for her hip fracture. She denies any history of dementia. Baseline mental status is AAOx3 and prior to hip fracture patient was living by herself.       INTERVAL HISTORY:    Failed SBT again today (low TV; high RSBI)  Hypotensive on Levophed this morning  Palliative Consulted  Pending family decision for trach placement      PAST MEDICAL & SURGICAL HISTORY  HTN (hypertension)    High cholesterol    Hypothyroid    History of colon resection    H/O: hysterectomy        ALLERGIES:  penicillin (Unknown)      MEDICATIONS:  MEDICATIONS  (STANDING):  atorvastatin 20 milliGRAM(s) Oral at bedtime  chlorhexidine 0.12% Liquid 15 milliLiter(s) Oral Mucosa two times a day  chlorhexidine 2% Cloths 1 Application(s) Topical <User Schedule>  dexMEDEtomidine Infusion 0.2 MICROgram(s)/kG/Hr (2.44 mL/Hr) IV Continuous <Continuous>  dextrose 5%. 1000 milliLiter(s) (50 mL/Hr) IV Continuous <Continuous>  dextrose 5%. 1000 milliLiter(s) (100 mL/Hr) IV Continuous <Continuous>  dextrose 50% Injectable 12.5 Gram(s) IV Push once  dextrose 50% Injectable 25 Gram(s) IV Push once  dextrose 50% Injectable 25 Gram(s) IV Push once  enoxaparin Injectable 40 milliGRAM(s) SubCutaneous every 24 hours  fentaNYL   Infusion. 0.5 MICROgram(s)/kG/Hr (2.44 mL/Hr) IV Continuous <Continuous>  glucagon  Injectable 1 milliGRAM(s) IntraMuscular once  insulin lispro (ADMELOG) corrective regimen sliding scale   SubCutaneous four times a day with meals  lactated ringers Bolus 250 milliLiter(s) IV Bolus every 15 minutes  levothyroxine 75 MICROGram(s) Oral daily  mupirocin 2% Ointment 1 Application(s) Topical two times a day  norepinephrine Infusion 0.05 MICROgram(s)/kG/Min (2.28 mL/Hr) IV Continuous <Continuous>  OLANZapine 2.5 milliGRAM(s) Oral at bedtime  pantoprazole   Suspension 40 milliGRAM(s) Oral daily  senna 2 Tablet(s) Oral at bedtime    MEDICATIONS  (PRN):  acetaminophen     Tablet .. 650 milliGRAM(s) Oral every 6 hours PRN Temp greater or equal to 38C (100.4F), Mild Pain (1 - 3)  dextrose Oral Gel 15 Gram(s) Oral once PRN Blood Glucose LESS THAN 70 milliGRAM(s)/deciliter      HOME MEDICATIONS:  Home Medications:  ascorbic acid 500 mg oral tablet: 1 tab(s) orally 2 times a day (07 Sep 2022 15:52)  docusate sodium 100 mg oral tablet: 1 tab(s) orally 2 times a day (07 Sep 2022 15:52)  lactulose 10 g oral powder for reconstitution: 30 milliliter(s) orally 2 times a day, As Needed for constipation (07 Sep 2022 15:52)  Lipitor 20 mg oral tablet: 1 tab(s) orally once a day (07 Sep 2022 15:52)  Melatonin 3 mg oral tablet: 1 tab(s) orally once a day (at bedtime) (07 Sep 2022 15:52)  Multiple Vitamins oral tablet: 1 tab(s) orally once a day (07 Sep 2022 15:52)  Synthroid 75 mcg (0.075 mg) oral tablet: 1 tab(s) orally once a day (07 Sep 2022 15:52)        OBJECTIVE:  ICU Vital Signs Last 24 Hrs  T(C): 36.8 (20 Sep 2022 08:00), Max: 36.8 (20 Sep 2022 08:00)  T(F): 98.3 (20 Sep 2022 08:00), Max: 98.3 (20 Sep 2022 08:00)  HR: 55 (20 Sep 2022 09:00) (48 - 129)  BP: 98/49 (20 Sep 2022 09:00) (73/38 - 187/87)  BP(mean): 71 (20 Sep 2022 09:00) (51 - 135)  ABP: --  ABP(mean): --  RR: 45 (20 Sep 2022 09:00) (23 - 50)  SpO2: 100% (20 Sep 2022 09:00) (98% - 100%)    O2 Parameters below as of 20 Sep 2022 09:00  Patient On (Oxygen Delivery Method): ventilator    O2 Concentration (%): 40      Mode: AC/ CMV (Assist Control/ Continuous Mandatory Ventilation)  RR (machine): 12  TV (machine): 350  FiO2: 40  PEEP: 5  ITime: 1  MAP: 8  PIP: 19      19 Sep 2022 07:01  -  20 Sep 2022 07:00  --------------------------------------------------------  IN: 1971.7 mL / OUT: 1555 mL / NET: 416.7 mL    20 Sep 2022 07:01  -  20 Sep 2022 10:14  --------------------------------------------------------  IN: 107 mL / OUT: 325 mL / NET: -218 mL      Daily     Daily     PHYSICAL EXAM:  NEURO: patient is awake, intubated, alert and agitated  GEN: Not in acute distress  NECK: no thyroid enlargement, no JVD  LUNGS: Clear to auscultation bilaterally   CARDIOVASCULAR: S1/S2 present, RRR , no murmurs or rubs, no carotid bruits,  + PP bilaterally  ABD: Soft, non-tender, non-distended, +BS  EXT: No KRISTOPHER  SKIN: Intact  ACCESS Site:    LABS:                        9.5    8.14  )-----------( 323      ( 20 Sep 2022 04:45 )             30.0     09-20    137  |  95<L>  |  50<H>  ----------------------------<  143<H>  3.9   |  34<H>  |  0.8    Ca    8.7      20 Sep 2022 04:45  Mg     1.9     09-20    TPro  5.2<L>  /  Alb  3.2<L>  /  TBili  0.3  /  DBili  x   /  AST  23  /  ALT  42<H>  /  AlkPhos  120<H>  09-20          RADIOLOGY:  CXR: Stable left basilar opacity.    ECG: Sinus bradycardia with 1st degree A-V block; Left axis deviation; Left bundle branch block    TELEMETRY EVENTS:

## 2022-09-20 NOTE — PROGRESS NOTE ADULT - ASSESSMENT
Assessment	  IMPRESSION:    Acute hypercapnic respiratory failure  Acute hypoxemic respiratory failure  THERESA with decreased urine output - resolved   H/o Hypothyroidism  Altered mental status   Acute Toxic Metabolic Encephalopathy  SIRS  LLL opacity/pneumonia    PLAN:    CNS: Following commands. Zyprexa 2.5mg QHS.   - SAT Daily    HEENT: Oral care; ETT care    PULMONARY: HOB @ 45 degrees.  - SBT failed  - CT Surgery for trach - pending family discussion    CARDIOVASCULAR: No IVF.     GI: OGT feeds    Bowel regimen    RENAL: Follow up lytes. Correct as needed. Patrick catheter. Kidney function back to baseline.  - Monitor Urine Output  - hold Lasix     INFECTIOUS DISEASE: Leukocytosis resolved. UA negative. Cultures negative. COVID PCR positive.       HEMATOLOGICAL: DVT prophylaxis: Lovenox SQ.     ENDOCRINE: Follow up FS. Insulin protocol if needed. Continue home meds.     MUSCULOSKELETAL: Bed in chair position.  - PT/OT Recommendations: Sub-acute rehab    DISPO: Critically ill and needs ICU level of care.   Assessment	  IMPRESSION:    Acute hypercapnic respiratory failure  Acute hypoxemic respiratory failure  THERESA with decreased urine output - resolved   H/o Hypothyroidism  Altered mental status   Acute Toxic Metabolic Encephalopathy  SIRS  LLL opacity/pneumonia    PLAN:    CNS: Following commands. Zyprexa 2.5mg QHS.   - SAT Daily  - F/U Neurology recommendations    HEENT: Oral care; ETT care    PULMONARY: HOB @ 45 degrees.  - SBT failed  - CT Surgery for trach - pending family discussion    CARDIOVASCULAR: No IVF.     GI: OGT feeds    Bowel regimen    RENAL: Follow up lytes. Correct as needed. Patrick catheter. Kidney function back to baseline.  - Monitor Urine Output  - hold Lasix     INFECTIOUS DISEASE: Leukocytosis resolved. UA negative. Cultures negative. COVID PCR positive.       HEMATOLOGICAL: DVT prophylaxis: Lovenox SQ.     ENDOCRINE: Follow up FS. Insulin protocol if needed. Continue home meds.     MUSCULOSKELETAL: Bed in chair position.  - PT/OT Recommendations: Sub-acute rehab    DISPO: Critically ill and needs ICU level of care.  - Palliative onboard

## 2022-09-20 NOTE — PROGRESS NOTE ADULT - ASSESSMENT
IMPRESSION:    Acute hypercapnic respiratory failure  Acute hypoxemic respiratory failure  THERESA with decreased urine output - resolved   H/o Hypothyroidism  Altered mental status   Acute Toxic Metabolic Encephalopathy  SIRS  LLL opacity/pneumonia    PLAN:    CNS: Following commands when off sedation. Zyprexa 2.5mg QHS. Fentanyl and precedex for sedation.     HEENT: Oral care; ETT care. Awaiting family's decision regardgin trach and peg.     PULMONARY:  No change in vent settings. CXR reviewed with no new infiltrates; left basilar opacity. She has failed multiple SBTs and was actually extubated and reintubated last week. At this point recommend tracheostomy placement if the family is agreeable to that.     CARDIOVASCULAR:  Keep equal balance. Now on Levophed; wean off as able; goal MAP more than 65mmh.. CHEETAH for fluid assessment.  IVC dilated on US.     GI: Tolerating feeding. GI prophylaxis.     RENAL: Follow up lytes. Correct as needed. Patrick catheter. Kidney function back to baseline. Keep equal balance. DC lasix for now.     INFECTIOUS DISEASE: Leukocytosis resolved. UA negative. Cultures negative. COVID PCR positive; off COVID precautions. DC doxycyline. Completed abx course.     HEMATOLOGICAL: DVT prophylaxis: Lovenox SQ.     ENDOCRINE: Follow up FS. Insulin protocol if needed. Continue home meds.     MUSCULOSKELETAL: Bed in chair position.    DISPO: Critically ill, back on the ventilator, failed SBTs. She will need tracheostomy placement as well. Also now on Levophed and mental status slightly worse.  IMPRESSION:    Acute hypercapnic respiratory failure  Acute hypoxemic respiratory failure  THERESA with decreased urine output - resolved   H/o Hypothyroidism  Altered mental status   Acute Toxic Metabolic Encephalopathy  SIRS  LLL opacity/pneumonia  Hypotension/shock on levophed       PLAN:    CNS: Following commands when off sedation. Zyprexa 2.5mg QHS. Fentanyl and precedex for sedation.     HEENT: Oral care; ETT care. Awaiting family's decision regardgin trach and peg.     PULMONARY:  No change in vent settings. CXR reviewed with no new infiltrates; left basilar opacity stable. She has failed multiple SBTs and was actually extubated and reintubated last week. At this point recommend tracheostomy placement if the family is agreeable to that.     CARDIOVASCULAR:  Keep equal balance. Now on Levophed; wean off as able; goal MAP more than 65mmh.. CHEETAH for fluid assessment.  IVC dilated on US.     GI: Tolerating feeding. GI prophylaxis.     RENAL: Follow up lytes. Correct as needed. Patrick catheter. Kidney function back to baseline. Keep equal balance. DC lasix for now.     INFECTIOUS DISEASE: Leukocytosis resolved. UA negative. Cultures negative. COVID PCR positive; off COVID precautions. DC doxycyline. Completed abx course.     HEMATOLOGICAL: DVT prophylaxis: Lovenox SQ.     ENDOCRINE: Follow up FS. Insulin protocol if needed. Continue home meds.     MUSCULOSKELETAL: Bed in chair position.    DISPO: Critically ill, back on the ventilator, failed SBTs; now back on pressors. She will need tracheostomy placement as well. Poor overall prognosis.

## 2022-09-20 NOTE — CONSULT NOTE ADULT - PROBLEM SELECTOR RECOMMENDATION 2
Vented, failing SBTs. Extubated and quickly desaturated and was reintubated  Onging CCU care S/P hip fx with repair at [facility], was receiving rehab when she fell ill with pneumonia and respiratory failure.  Supportive care

## 2022-09-20 NOTE — CHART NOTE - NSCHARTNOTEFT_GEN_A_CORE
PALLIATIVE MEDICINE INTERDISCIPLINARY TEAM NOTE    Provider:                                         [  X ] Initial visit        Met with: [   ] Patient  [ X  ] Family  [   ] Other:    Primary Language: [  X ] English [   ] Other*:                      *Interpretation provided by:    SUPPORT DIAGNOSES            (Check all that apply)    [   ] EOL issues  [ X  ] Advanced Illness  [   ] Cultural / spiritual concerns  [   ] Pain / suffering  [   ] Dementia / AMS  [   ] Other:  [  X ] AD issues  [   ] Grief / loss / sadness  [   ] Discharge issues  [ X  ] Distress / coping    PSYCHOSOCIAL ASSESSMENT OF PATIENT         (Check all that apply)    [ X  ] Initial Assessment            [   ] Reassessment          [   ] Not Applicable this visit    Pain/suffering acuity:  [  X ] None to mild (0-3)           [   ] Moderate (4-6)        [   ] High (7-10)    Mental Status:  [   ] Alert/oriented (x3)          [   ] Confused/Altered(x2/x1)         [ X  ] Non-resp: Intubated     Functional status:  [   ] Independent w ADLs      [   ] Needs Assistance             [ X  ] Bedbound/Full Care    Coping:  [   ] Coping well                     [  X ] Coping w/difficulty  (family)          [   ] Poor coping    Support system:  [ X  ] Strong                              [   ] Adequate                        [   ] Inadequate      Past history and medications for:     [ ] Anxiety       [ ] Depression    [ ] Sleep disorders       SERVICE PROVIDED  [   ]Discharge support / facilitation  [   ]AD / goals of care counseling                                  [   ]EOL / death / bereavement counseling  [  X ]Counseling / support                                              [ X  ]Family meeting  [   ]Prayer / sacrament / ritual                                      [   ] Referral   [   ]Other                                                                       NOTE and Plan of Care (PoC):    patient is a 88 y/o F with noted pmhx, presenting from NH with hypoxia and hypotension. team spk with patient's dtr - Matilde. reviewed role of palliative care. Matilde detailed patient's hx. She was treated for a fx at [facility] and then transferred to [facility]. We discussed advance directives. Matilde said her sister is coming from [place] thursday night. plan for family meeting friday at 1030AM. all questions answered. support rendered. will follow. [phone]

## 2022-09-20 NOTE — CONSULT NOTE ADULT - CONVERSATION DETAILS
Palliative Care introduced     Reviewed overall condition and treatment. Role of care explained. Daughter upset and anxious about mom's advanced illness as she was " never she before she fell"    She said CCU team has been updating her but she is unhappy with her mom's prognosis. We discussed how she got to the point where she needs a trach. She did understand that due to her age and previous fall she was at risk. She reports that her home MD says she is strong and will make it. We discussed that he has not seen her, and that she has declined medically.    Daughter and other family members are not sure about a tracheostomy, but they are struggling with letting mom go as well. Agreed that they need a meeting with palliative team and CCU team.

## 2022-09-20 NOTE — CONSULT NOTE ADULT - NS ATTEND AMEND GEN_ALL_CORE FT
Agree with above, patient critically ill, planning for family meeting Friday, and will discuss GOC sooner if needed based on clinical status  ______________  Brannon Keen MD  Palliative Medicine  [facility]  [phone]

## 2022-09-20 NOTE — PROGRESS NOTE ADULT - SUBJECTIVE AND OBJECTIVE BOX
Patient is a 89y old  Female who presents with a chief complaint of AMS, respiratory failure (19 Sep 2022 08:18)        Over Night Events:    Failed SBT again today; low TV; high RSBI  No fevers   Hypotensive on Levophed this morning        ROS:  See HPI    PHYSICAL EXAM    ICU Vital Signs Last 24 Hrs  T(C): 36.8 (20 Sep 2022 08:00), Max: 36.8 (20 Sep 2022 08:00)  T(F): 98.3 (20 Sep 2022 08:00), Max: 98.3 (20 Sep 2022 08:00)  HR: 70 (20 Sep 2022 08:00) (48 - 129)  BP: 111/51 (20 Sep 2022 08:00) (73/38 - 192/83)  BP(mean): 73 (20 Sep 2022 08:00) (51 - 135)  ABP: --  ABP(mean): --  RR: 30 (20 Sep 2022 08:00) (20 - 50)  SpO2: 99% (20 Sep 2022 08:00) (98% - 100%)    O2 Parameters below as of 20 Sep 2022 08:00  Patient On (Oxygen Delivery Method): ventilator    O2 Concentration (%): 40        General: ETT  HEENT: MIC             Lymphatic system: No cervical LN   Lungs: Bilateral BS  Cardiovascular: Regular   Gastrointestinal: Soft, Positive BS  Extremities: No clubbing.  Moves extremities.  Full Range of motion   Skin: Warm, intact  Neurological: No motor or sensory deficit; awake off sedation        09-19-22 @ 07:01  -  09-20-22 @ 07:00  --------------------------------------------------------  IN:    Dexmedetomidine: 25.4 mL    Enteral Tube Flush: 40 mL    FentaNYL: 357.8 mL    Norepinephrine: 108.5 mL    Peptamen A.F.: 1440 mL  Total IN: 1971.7 mL    OUT:    Indwelling Catheter - Urethral (mL): 1555 mL  Total OUT: 1555 mL    Total NET: 416.7 mL      09-20-22 @ 07:01  -  09-20-22 @ 08:14  --------------------------------------------------------  IN:    Dexmedetomidine: 0.5 mL    Enteral Tube Flush: 50 mL    FentaNYL: 17 mL  Total IN: 67.5 mL    OUT:    Indwelling Catheter - Urethral (mL): 175 mL    Norepinephrine: 0 mL  Total OUT: 175 mL    Total NET: -107.5 mL          LABS:                            9.5    8.14  )-----------( 323      ( 20 Sep 2022 04:45 )             30.0                                               09-20    137  |  95<L>  |  50<H>  ----------------------------<  143<H>  3.9   |  34<H>  |  0.8    Ca    8.7      20 Sep 2022 04:45  Mg     1.9     09-20    TPro  5.2<L>  /  Alb  3.2<L>  /  TBili  0.3  /  DBili  x   /  AST  23  /  ALT  42<H>  /  AlkPhos  120<H>  09-20                                                                                           LIVER FUNCTIONS - ( 20 Sep 2022 04:45 )  Alb: 3.2 g/dL / Pro: 5.2 g/dL / ALK PHOS: 120 U/L / ALT: 42 U/L / AST: 23 U/L / GGT: x                                                                                               Mode: AC/ CMV (Assist Control/ Continuous Mandatory Ventilation)  RR (machine): 12  TV (machine): 350  FiO2: 40  PEEP: 5  ITime: 1  MAP: 8  PIP: 19                                      ABG - ( 20 Sep 2022 02:47 )  pH, Arterial: 7.44  pH, Blood: x     /  pCO2: 45    /  pO2: 196   / HCO3: 31    / Base Excess: 5.6   /  SaO2: 100.0               MEDICATIONS  (STANDING):  atorvastatin 20 milliGRAM(s) Oral at bedtime  chlorhexidine 0.12% Liquid 15 milliLiter(s) Oral Mucosa two times a day  chlorhexidine 2% Cloths 1 Application(s) Topical <User Schedule>  dexMEDEtomidine Infusion 0.2 MICROgram(s)/kG/Hr (2.44 mL/Hr) IV Continuous <Continuous>  dextrose 5%. 1000 milliLiter(s) (50 mL/Hr) IV Continuous <Continuous>  dextrose 5%. 1000 milliLiter(s) (100 mL/Hr) IV Continuous <Continuous>  dextrose 50% Injectable 12.5 Gram(s) IV Push once  dextrose 50% Injectable 25 Gram(s) IV Push once  dextrose 50% Injectable 25 Gram(s) IV Push once  enoxaparin Injectable 40 milliGRAM(s) SubCutaneous every 24 hours  fentaNYL   Infusion. 0.5 MICROgram(s)/kG/Hr (2.44 mL/Hr) IV Continuous <Continuous>  furosemide   Injectable 20 milliGRAM(s) IV Push two times a day  glucagon  Injectable 1 milliGRAM(s) IntraMuscular once  insulin lispro (ADMELOG) corrective regimen sliding scale   SubCutaneous four times a day with meals  levothyroxine 75 MICROGram(s) Oral daily  mupirocin 2% Ointment 1 Application(s) Topical two times a day  norepinephrine Infusion 0.05 MICROgram(s)/kG/Min (2.28 mL/Hr) IV Continuous <Continuous>  OLANZapine 2.5 milliGRAM(s) Oral at bedtime  pantoprazole   Suspension 40 milliGRAM(s) Oral daily  senna 2 Tablet(s) Oral at bedtime    MEDICATIONS  (PRN):  acetaminophen     Tablet .. 650 milliGRAM(s) Oral every 6 hours PRN Temp greater or equal to 38C (100.4F), Mild Pain (1 - 3)  dextrose Oral Gel 15 Gram(s) Oral once PRN Blood Glucose LESS THAN 70 milliGRAM(s)/deciliter      Xrays:                                                                                     ECHO

## 2022-09-20 NOTE — CONSULT NOTE ADULT - PROBLEM SELECTOR RECOMMENDATION 9
Full code  Ongoing GOC discussions w/ daughters   Palliative care meeting 9/23 10:30am Vented, failing SBTs. Extubated and quickly desaturated and was reintubated  Onging CCU care, c/w IV fentanyl, IV pressors, high risk

## 2022-09-21 NOTE — PROGRESS NOTE ADULT - SUBJECTIVE AND OBJECTIVE BOX
Patient is a 89y old  Female who presents with a chief complaint of AMS, respiratory failure (20 Sep 2022 14:12)        Over Night Events:    No events   Remains on the vent   On Levophed 0.2mcg        ROS:  See HPI    PHYSICAL EXAM    ICU Vital Signs Last 24 Hrs  T(C): 36.1 (21 Sep 2022 04:00), Max: 36.9 (20 Sep 2022 16:00)  T(F): 97 (21 Sep 2022 04:00), Max: 98.4 (20 Sep 2022 16:00)  HR: 55 (21 Sep 2022 07:00) (42 - 117)  BP: 95/50 (21 Sep 2022 07:00) (87/43 - 133/58)  BP(mean): 66 (21 Sep 2022 07:00) (62 - 110)  ABP: --  ABP(mean): --  RR: 29 (21 Sep 2022 07:00) (12 - 45)  SpO2: 100% (21 Sep 2022 07:00) (100% - 100%)    O2 Parameters below as of 21 Sep 2022 07:00  Patient On (Oxygen Delivery Method): ventilator    O2 Concentration (%): 40        General: intubated   HEENT: MIC             Lymphatic system: No cervical LN   Lungs: Bilateral BS  Cardiovascular: Regular   Gastrointestinal: Soft, Positive BS  Extremities: No clubbing.  Moves extremities.  Full Range of motion   Skin: Warm, intact  Neurological: No motor or sensory deficit; agitated at times        09-20-22 @ 07:01  -  09-21-22 @ 07:00  --------------------------------------------------------  IN:    Dexmedetomidine: 108.4 mL    Enteral Tube Flush: 270 mL    FentaNYL: 419.8 mL    Norepinephrine: 279.5 mL    Peptamen A.F.: 1440 mL  Total IN: 2517.7 mL    OUT:    Indwelling Catheter - Urethral (mL): 1405 mL  Total OUT: 1405 mL    Total NET: 1112.7 mL          LABS:                            8.9    6.83  )-----------( 271      ( 21 Sep 2022 04:30 )             28.5                                               09-21    137  |  98  |  51<H>  ----------------------------<  199<H>  3.9   |  34<H>  |  0.8    Ca    8.8      21 Sep 2022 04:30  Mg     1.8     09-21    TPro  5.0<L>  /  Alb  2.9<L>  /  TBili  0.2  /  DBili  x   /  AST  18  /  ALT  32  /  AlkPhos  115  09-21                                                                                           LIVER FUNCTIONS - ( 21 Sep 2022 04:30 )  Alb: 2.9 g/dL / Pro: 5.0 g/dL / ALK PHOS: 115 U/L / ALT: 32 U/L / AST: 18 U/L / GGT: x                                                                                               Mode: AC/ CMV (Assist Control/ Continuous Mandatory Ventilation)  RR (machine): 12  TV (machine): 350  FiO2: 40  PEEP: 5  ITime: 1  MAP: 7  PIP: 21                                      ABG - ( 21 Sep 2022 02:43 )  pH, Arterial: 7.36  pH, Blood: x     /  pCO2: 60    /  pO2: 180   / HCO3: 34    / Base Excess: 6.5   /  SaO2: 100.0               MEDICATIONS  (STANDING):  atorvastatin 20 milliGRAM(s) Oral at bedtime  chlorhexidine 0.12% Liquid 15 milliLiter(s) Oral Mucosa two times a day  chlorhexidine 2% Cloths 1 Application(s) Topical <User Schedule>  dexMEDEtomidine Infusion 0.2 MICROgram(s)/kG/Hr (2.44 mL/Hr) IV Continuous <Continuous>  dextrose 5%. 1000 milliLiter(s) (100 mL/Hr) IV Continuous <Continuous>  dextrose 5%. 1000 milliLiter(s) (50 mL/Hr) IV Continuous <Continuous>  dextrose 50% Injectable 25 Gram(s) IV Push once  dextrose 50% Injectable 12.5 Gram(s) IV Push once  dextrose 50% Injectable 25 Gram(s) IV Push once  enoxaparin Injectable 40 milliGRAM(s) SubCutaneous every 24 hours  fentaNYL   Infusion. 0.5 MICROgram(s)/kG/Hr (2.44 mL/Hr) IV Continuous <Continuous>  glucagon  Injectable 1 milliGRAM(s) IntraMuscular once  insulin lispro (ADMELOG) corrective regimen sliding scale   SubCutaneous four times a day with meals  levothyroxine 75 MICROGram(s) Oral daily  mupirocin 2% Ointment 1 Application(s) Topical two times a day  norepinephrine Infusion 0.05 MICROgram(s)/kG/Min (2.28 mL/Hr) IV Continuous <Continuous>  OLANZapine 2.5 milliGRAM(s) Oral at bedtime  pantoprazole   Suspension 40 milliGRAM(s) Oral daily  senna 2 Tablet(s) Oral at bedtime    MEDICATIONS  (PRN):  acetaminophen     Tablet .. 650 milliGRAM(s) Oral every 6 hours PRN Temp greater or equal to 38C (100.4F), Mild Pain (1 - 3)  dextrose Oral Gel 15 Gram(s) Oral once PRN Blood Glucose LESS THAN 70 milliGRAM(s)/deciliter      Xrays:                                                                                     ECHO

## 2022-09-21 NOTE — PROGRESS NOTE ADULT - ASSESSMENT
IMP:  89yo F w/ PMHx of HTN, HLD, hypothyroidism, recent left femur fracture s/p surgery 1 month PTA in [facility], presented to ED from [facility] with hypoxia and hypotension. Intubated 9/7 and failed SBT on 9/9.    - no longer having loose BMs, bowel preps adjusted, cont to receive hydrolyzed Rx  - continue Peptamen AF, but decrease to 360 mL 3x per day (provides 27 kcal/kg/day)   - d/w resident - to repeat phos and correct severe hypophosphatemia    Per pulmonary attending: Critically ill, back on the ventilator, failed SBTs, hypotensive on pressors. She will need tracheostomy placement vs palliative liberation from the ventilator.   - will continue to follow

## 2022-09-21 NOTE — PROGRESS NOTE ADULT - ASSESSMENT
IMPRESSION:    Acute hypercapnic respiratory failure  Acute hypoxemic respiratory failure  THERESA with decreased urine output - resolved   H/o Hypothyroidism  Altered mental status   Acute Toxic Metabolic Encephalopathy  SIRS  LLL opacity/pneumonia    PLAN:    CNS: Following commands. Agitated and back on sedation: Fentanyl and Precedex.     HEENT: Oral care; ETT care. CTS eval for possible tach if family agrees.     PULMONARY:  Increase RR to 14. CXR reviewed with no new infiltrates; left basilar opacity. She has failed multiple SBTs and was actually extubated and reintubated last week. At this point recommend tracheostomy placement if the family is agreeable to that.     CARDIOVASCULAR:  Keep equal balance. Back on low dose Levophed. Add midodrine 10mg [id number].     GI: Tolerating feeding. GI prophylaxis.     RENAL: Follow up lytes. Correct as needed. Patrick catheter. Kidney function back to baseline. Keep equal balance.     INFECTIOUS DISEASE: Leukocytosis resolved. UA negative. Cultures negative. COVID PCR positive. DC doxycyline. Completed abx course.     HEMATOLOGICAL: DVT prophylaxis: Lovenox SQ.     ENDOCRINE: Follow up FS. Insulin protocol if needed.     MUSCULOSKELETAL: Bed in chair position.    DISPO: Critically ill, back on the ventilator, failed SBTs, hypotensive on pressors. She will need tracheostomy placement vs palliative liberation from the ventilator.

## 2022-09-21 NOTE — PROGRESS NOTE ADULT - SUBJECTIVE AND OBJECTIVE BOX
NUTRITION SUPPORT TEAM  -  PROGRESS NOTE     Interval Events:  pt seen this morning  remains vented, sedated on precedex and fentanyl  + levo  OG [place] still  abd soft, ND, NT, + BM, no dignishield - only on senna, no further reported loose stools  failed SBT    VITALS:  T(F): 96.8 (09-21 @ 12:00), Max: 97.2 (09-21 @ 08:00)  HR: 113 (09-21 @ 14:12) (42 - 113)  BP: 88/52 (09-21 @ 14:12) (70/51 - 125/59)  RR: 38 (09-21 @ 14:12) (12 - 38)  SpO2: 100% (09-21 @ 14:12) (99% - 100%)    HEIGHT/WEIGHT/BMI:   Height (cm): 152.4 (09-08)  Weight (kg): 48.7 (09-08)  BMI (kg/m2): 21 (09-08)    I/Os:     09-20-22 @ 07:01  -  09-21-22 @ 07:00  --------------------------------------------------------  IN:    Dexmedetomidine: 108.4 mL    Enteral Tube Flush: 270 mL    FentaNYL: 419.8 mL    Norepinephrine: 279.5 mL    Peptamen A.F.: 1440 mL  Total IN: 2517.7 mL    OUT:    Indwelling Catheter - Urethral (mL): 1405 mL  Total OUT: 1405 mL    Total NET: 1112.7 mL    STANDING MEDICATIONS:   atorvastatin 20 milliGRAM(s) Oral at bedtime  chlorhexidine 0.12% Liquid 15 milliLiter(s) Oral Mucosa two times a day  chlorhexidine 2% Cloths 1 Application(s) Topical <User Schedule>  dexMEDEtomidine Infusion 0.2 MICROgram(s)/kG/Hr IV Continuous <Continuous>  enoxaparin Injectable 40 milliGRAM(s) SubCutaneous every 24 hours  fentaNYL   Infusion. 0.5 MICROgram(s)/kG/Hr IV Continuous <Continuous>  insulin lispro (ADMELOG) corrective regimen sliding scale   SubCutaneous four times a day with meals  levothyroxine 75 MICROGram(s) Oral daily  midodrine 10 milliGRAM(s) Oral every 8 hours  mupirocin 2% Ointment 1 Application(s) Topical two times a day  norepinephrine Infusion 0.05 MICROgram(s)/kG/Min IV Continuous <Continuous>  pantoprazole   Suspension 40 milliGRAM(s) Oral daily  senna 2 Tablet(s) Oral at bedtime    Mode: AC/ CMV (Assist Control/ Continuous Mandatory Ventilation)  RR (machine): 12  TV (machine): 350  FiO2: 40  PEEP: 5  ITime: 1  MAP: 7  PIP: 20  ABG - ( 21 Sep 2022 02:43 )  pH, Arterial: 7.36  pH, Blood: x     /  pCO2: 60    /  pO2: 180   / HCO3: 34    / Base Excess: 6.5   /  SaO2: 100.0       LABS:                         8.9    6.83  )-----------( 271      ( 21 Sep 2022 04:30 )             28.5     137  |  98  |  51<H>  ----------------------------<  199<H>          (09-21-22 @ 04:30)  3.9   |  34<H>  |  0.8    Ca    8.8          (09-21-22 @ 04:30)  Mg     1.8         (09-21-22 @ 04:30)    TPro  5.0<L>  /  Alb  2.9<L>  /  TBili  0.2  /  DBili  x   /  AST  18  /  ALT  32  /  AlkPhos  115       09-21-22 @ 04:30  Phosphorus Level, Serum: 1.3 mg/dL (09.10.22 @ 04:42)   Blood Glucose (Past 24 hours):  185 mg/dL (09-21 @ 14:53)  85 mg/dL (09-21 @ 11:43)  166 mg/dL (09-21 @ 07:36)  155 mg/dL (09-20 @ 21:58)  146 mg/dL (09-20 @ 17:14)    DIET:   Diet, NPO with Tube Feed:   Tube Feeding Modality: Orogastric  Peptamen A.F. Formula  Total Volume for 24 Hours (mL): 1440  Bolus  Total Volume of Bolus (mL):  360  Tube Feed Frequency: Every 6 hours   Tube Feed Start Time: 00:00  Bolus Feed Rate (mL per Hour): 360   Bolus Feed Duration (in Hours): 24 (09-08-22 @ 23:55) [Active]

## 2022-09-22 NOTE — PROGRESS NOTE ADULT - SUBJECTIVE AND OBJECTIVE BOX
Patient is a 89y old  Female who presents with a chief complaint of AMS, respiratory failure (22 Sep 2022 07:36)        Over Night Events:  Remains very agitated off sedation.  Remains on levophed.  Family meeting planned for Friday.      ROS:     All pertinent ROS are negative except HPI         PHYSICAL EXAM    ICU Vital Signs Last 24 Hrs  T(C): 36.2 (22 Sep 2022 08:00), Max: 36.2 (22 Sep 2022 08:00)  T(F): 97.2 (22 Sep 2022 08:00), Max: 97.2 (22 Sep 2022 08:00)  HR: 102 (22 Sep 2022 08:00) (41 - 129)  BP: 135/63 (22 Sep 2022 08:00) (70/51 - 143/63)  BP(mean): 84 (22 Sep 2022 07:00) (57 - 113)  RR: 38 (22 Sep 2022 08:00) (15 - 56)  SpO2: 100% (22 Sep 2022 08:00) (97% - 100%)    O2 Parameters below as of 22 Sep 2022 08:00  Patient On (Oxygen Delivery Method): ventilator          General: intubated   HEENT: MIC             Lymphatic system: No cervical LN   Lungs: Bilateral BS  Cardiovascular: Regular   Gastrointestinal: Diffuse guarding, Positive BS  Extremities: No clubbing.  Moves extremities.  Full Range of motion   Skin: Warm, intact  Neurological: No motor or sensory deficit; agitated at times          09-21-22 @ 07:01  -  09-22-22 @ 07:00  --------------------------------------------------------  IN:    Dexmedetomidine: 112.9 mL    FentaNYL: 379.7 mL    Norepinephrine: 246.5 mL    Peptamen A.F.: 1440 mL  Total IN: 2179 mL    OUT:    Indwelling Catheter - Urethral (mL): 1465 mL  Total OUT: 1465 mL    Total NET: 714 mL          LABS:                            9.6    8.10  )-----------( 320      ( 22 Sep 2022 05:07 )             30.6                                               09-22    140  |  101  |  46<H>  ----------------------------<  163<H>  4.5   |  32  |  0.7    Ca    8.9      22 Sep 2022 05:07  Phos  2.5     09-22  Mg     1.8     09-22    TPro  5.3<L>  /  Alb  3.2<L>  /  TBili  0.3  /  DBili  x   /  AST  20  /  ALT  29  /  AlkPhos  113  09-22                                                                                           LIVER FUNCTIONS - ( 22 Sep 2022 05:07 )  Alb: 3.2 g/dL / Pro: 5.3 g/dL / ALK PHOS: 113 U/L / ALT: 29 U/L / AST: 20 U/L / GGT: x                                                                                               Mode: AC/ CMV (Assist Control/ Continuous Mandatory Ventilation)  RR (machine): 14  TV (machine): 350  FiO2: 40  PEEP: 5  ITime: 1  MAP: 8  PIP: 21                                      ABG - ( 22 Sep 2022 04:58 )  pH, Arterial: 7.38  pH, Blood: x     /  pCO2: 53    /  pO2: 145   / HCO3: 31    / Base Excess: 4.9   /  SaO2: 100.0               MEDICATIONS  (STANDING):  atorvastatin 20 milliGRAM(s) Oral at bedtime  chlorhexidine 0.12% Liquid 15 milliLiter(s) Oral Mucosa two times a day  chlorhexidine 2% Cloths 1 Application(s) Topical <User Schedule>  dexMEDEtomidine Infusion 0.2 MICROgram(s)/kG/Hr (2.44 mL/Hr) IV Continuous <Continuous>  dextrose 5%. 1000 milliLiter(s) (50 mL/Hr) IV Continuous <Continuous>  dextrose 5%. 1000 milliLiter(s) (100 mL/Hr) IV Continuous <Continuous>  dextrose 50% Injectable 25 Gram(s) IV Push once  dextrose 50% Injectable 12.5 Gram(s) IV Push once  dextrose 50% Injectable 25 Gram(s) IV Push once  enoxaparin Injectable 40 milliGRAM(s) SubCutaneous every 24 hours  fentaNYL   Infusion. 0.5 MICROgram(s)/kG/Hr (2.44 mL/Hr) IV Continuous <Continuous>  glucagon  Injectable 1 milliGRAM(s) IntraMuscular once  insulin lispro (ADMELOG) corrective regimen sliding scale   SubCutaneous four times a day with meals  levothyroxine 75 MICROGram(s) Oral daily  midodrine 10 milliGRAM(s) Oral every 8 hours  mupirocin 2% Ointment 1 Application(s) Topical two times a day  norepinephrine Infusion 0.05 MICROgram(s)/kG/Min (2.28 mL/Hr) IV Continuous <Continuous>  OLANZapine 2.5 milliGRAM(s) Oral at bedtime  pantoprazole   Suspension 40 milliGRAM(s) Oral daily  senna 2 Tablet(s) Oral at bedtime    MEDICATIONS  (PRN):  acetaminophen     Tablet .. 650 milliGRAM(s) Oral every 6 hours PRN Temp greater or equal to 38C (100.4F), Mild Pain (1 - 3)  dextrose Oral Gel 15 Gram(s) Oral once PRN Blood Glucose LESS THAN 70 milliGRAM(s)/deciliter       Patient is a 89y old  Female who presents with a chief complaint of AMS, respiratory failure (22 Sep 2022 07:36)        Over Night Events:    Remains very agitated off sedation.  Remains on levophed.  Family meeting planned for Friday.      ROS:     All pertinent ROS are negative except HPI         PHYSICAL EXAM    ICU Vital Signs Last 24 Hrs  T(C): 36.2 (22 Sep 2022 08:00), Max: 36.2 (22 Sep 2022 08:00)  T(F): 97.2 (22 Sep 2022 08:00), Max: 97.2 (22 Sep 2022 08:00)  HR: 102 (22 Sep 2022 08:00) (41 - 129)  BP: 135/63 (22 Sep 2022 08:00) (70/51 - 143/63)  BP(mean): 84 (22 Sep 2022 07:00) (57 - 113)  RR: 38 (22 Sep 2022 08:00) (15 - 56)  SpO2: 100% (22 Sep 2022 08:00) (97% - 100%)    O2 Parameters below as of 22 Sep 2022 08:00  Patient On (Oxygen Delivery Method): ventilator          General: intubated   HEENT: MIC             Lymphatic system: No cervical LN   Lungs: Bilateral BS  Cardiovascular: Regular   Gastrointestinal: Diffuse guarding, Positive BS  Extremities: No clubbing.  Moves extremities.  Full Range of motion   Skin: Warm, intact  Neurological: No motor or sensory deficit; agitated at times          09-21-22 @ 07:01  -  09-22-22 @ 07:00  --------------------------------------------------------  IN:    Dexmedetomidine: 112.9 mL    FentaNYL: 379.7 mL    Norepinephrine: 246.5 mL    Peptamen A.F.: 1440 mL  Total IN: 2179 mL    OUT:    Indwelling Catheter - Urethral (mL): 1465 mL  Total OUT: 1465 mL    Total NET: 714 mL          LABS:                            9.6    8.10  )-----------( 320      ( 22 Sep 2022 05:07 )             30.6                                               09-22    140  |  101  |  46<H>  ----------------------------<  163<H>  4.5   |  32  |  0.7    Ca    8.9      22 Sep 2022 05:07  Phos  2.5     09-22  Mg     1.8     09-22    TPro  5.3<L>  /  Alb  3.2<L>  /  TBili  0.3  /  DBili  x   /  AST  20  /  ALT  29  /  AlkPhos  113  09-22                                                                                           LIVER FUNCTIONS - ( 22 Sep 2022 05:07 )  Alb: 3.2 g/dL / Pro: 5.3 g/dL / ALK PHOS: 113 U/L / ALT: 29 U/L / AST: 20 U/L / GGT: x                                                                                               Mode: AC/ CMV (Assist Control/ Continuous Mandatory Ventilation)  RR (machine): 14  TV (machine): 350  FiO2: 40  PEEP: 5  ITime: 1  MAP: 8  PIP: 21                                      ABG - ( 22 Sep 2022 04:58 )  pH, Arterial: 7.38  pH, Blood: x     /  pCO2: 53    /  pO2: 145   / HCO3: 31    / Base Excess: 4.9   /  SaO2: 100.0               MEDICATIONS  (STANDING):  atorvastatin 20 milliGRAM(s) Oral at bedtime  chlorhexidine 0.12% Liquid 15 milliLiter(s) Oral Mucosa two times a day  chlorhexidine 2% Cloths 1 Application(s) Topical <User Schedule>  dexMEDEtomidine Infusion 0.2 MICROgram(s)/kG/Hr (2.44 mL/Hr) IV Continuous <Continuous>  dextrose 5%. 1000 milliLiter(s) (50 mL/Hr) IV Continuous <Continuous>  dextrose 5%. 1000 milliLiter(s) (100 mL/Hr) IV Continuous <Continuous>  dextrose 50% Injectable 25 Gram(s) IV Push once  dextrose 50% Injectable 12.5 Gram(s) IV Push once  dextrose 50% Injectable 25 Gram(s) IV Push once  enoxaparin Injectable 40 milliGRAM(s) SubCutaneous every 24 hours  fentaNYL   Infusion. 0.5 MICROgram(s)/kG/Hr (2.44 mL/Hr) IV Continuous <Continuous>  glucagon  Injectable 1 milliGRAM(s) IntraMuscular once  insulin lispro (ADMELOG) corrective regimen sliding scale   SubCutaneous four times a day with meals  levothyroxine 75 MICROGram(s) Oral daily  midodrine 10 milliGRAM(s) Oral every 8 hours  mupirocin 2% Ointment 1 Application(s) Topical two times a day  norepinephrine Infusion 0.05 MICROgram(s)/kG/Min (2.28 mL/Hr) IV Continuous <Continuous>  OLANZapine 2.5 milliGRAM(s) Oral at bedtime  pantoprazole   Suspension 40 milliGRAM(s) Oral daily  senna 2 Tablet(s) Oral at bedtime    MEDICATIONS  (PRN):  acetaminophen     Tablet .. 650 milliGRAM(s) Oral every 6 hours PRN Temp greater or equal to 38C (100.4F), Mild Pain (1 - 3)  dextrose Oral Gel 15 Gram(s) Oral once PRN Blood Glucose LESS THAN 70 milliGRAM(s)/deciliter

## 2022-09-22 NOTE — CHART NOTE - NSCHARTNOTEFT_GEN_A_CORE
Spoke to CCU team and attending    Discussed family meeting scheduled for 9/23 at 10:30am. Did reserve a room in [facility]    Plan: to discuss patient's overall prognosis and plan for possible trach PEG vs palliative options

## 2022-09-22 NOTE — PROGRESS NOTE ADULT - ASSESSMENT
IMPRESSION:    Acute hypercapnic respiratory failure  Acute hypoxemic respiratory failure  THERESA with decreased urine output - resolved   H/o Hypothyroidism  Altered mental status   Acute Toxic Metabolic Encephalopathy  SIRS  LLL opacity/pneumonia    PLAN:    CNS: Following commands. Agitated and back on sedation: Fentanyl. Precedex dc'd fro bradycardia; Add propofol. SAT today. Increase olanzapine to 5.    HEENT: Oral care; ETT care. CTS eval for possible tach if family agrees, family meeting planned for Friday.    PULMONARY: CXR reviewed with new left upper lobe interstitial infiltrates. Keep same vent settings. She has failed multiple SBTs and was actually extubated and reintubated last week. At this point recommend tracheostomy placement if the family is agreeable to that.     CARDIOVASCULAR: Lasix 20 IV once. Keep equal balance. Back on low dose Levophed. Add midodrine 10mg [id number].     GI: Tolerating feeding. GI prophylaxis. KUB today.    RENAL: Follow up lytes. Correct as needed. Patrick catheter. Kidney function back to baseline. Keep equal balance.     INFECTIOUS DISEASE: Leukocytosis resolved. UA negative. Cultures negative. COVID PCR positive. Completed abx course.     HEMATOLOGICAL: DVT prophylaxis: Lovenox SQ.     ENDOCRINE: Follow up FS. Insulin protocol if needed.     MUSCULOSKELETAL: Bed in chair position.    DISPO: Critically ill, back on the ventilator, failed SBTs, hypotensive on pressors. She will need tracheostomy placement vs palliative liberation from the ventilator.  IMPRESSION:    Acute hypercapnic respiratory failure  Acute hypoxemic respiratory failure  THERESA with decreased urine output - resolved   H/o Hypothyroidism  Altered mental status   Acute Toxic Metabolic Encephalopathy  SIRS  LLL opacity/pneumonia    PLAN:    CNS: Following commands. Agitated and back on sedation: Fentanyl. Precedex dc'd fro bradycardia; Start propofol if needed. SAT daily. Olanzapine for agitation. Daily EKG for QTc.     HEENT: Oral care; ETT care. CTS eval for possible tach if family agrees, family meeting planned for Friday.    PULMONARY:  Keep same vent settings. She has failed multiple SBTs and was actually extubated and reintubated last week. At this point recommend tracheostomy placement if the family is agreeable to that. CXR reviewed with mild pulm congestion on the left.     CARDIOVASCULAR: Lasix 20 IV once. Keep equal balance. Back on low dose Levophed. Add midodrine 20mg [id number].     GI: Tolerating feeding. GI prophylaxis. KUB today. Bowel regimen. Lactic is normal. LFTs normal.     RENAL: Follow up lytes. Correct as needed. Patrick catheter. Kidney function back to baseline. Keep equal balance.     INFECTIOUS DISEASE: Leukocytosis resolved. UA negative. Cultures negative. COVID PCR positive. Completed abx course.     HEMATOLOGICAL: DVT prophylaxis: Lovenox SQ.     ENDOCRINE: Follow up FS. Insulin protocol if needed.     MUSCULOSKELETAL: Bed in chair position.    DISPO: Critically ill, back on the ventilator, failed SBTs, hypotensive on pressors. She will need tracheostomy placement vs palliative liberation from the ventilator. Prognosis is dismal. Family meeting tomorrow.

## 2022-09-22 NOTE — PROGRESS NOTE ADULT - SUBJECTIVE AND OBJECTIVE BOX
HPI:  89yo F w/ pmhx of HTN, HLD, hypothyroidism, recent left femur fracture s/p surgery 1mo in [facility] presented to ED from [facility] with hypoxia and hypotension. Patient was saturating 50% on room air and improved only minimally on NRB in the ED. History was not able to be obtained by the patient as she was unresponsive and she was intubated for airway protection. Collateral HPI obtained by daughter Becky who said she was sent to the NH for rehab for her hip fracture. She denies any history of dementia. Baseline mental status is AAOx3 and prior to hip fracture patient was living by herself.     Of note patient was being treated in NH for R sided pneumonia (CXR : R perihilar infiltrate) and was receiving levaquin.         INTERVAL HISTORY:    Agitation overnight - given 6mg of Versed   Precedex held for bradycardia    PAST MEDICAL & SURGICAL HISTORY  HTN (hypertension)    High cholesterol    Hypothyroid    History of colon resection    H/O: hysterectomy        ALLERGIES:  penicillin (Unknown)      MEDICATIONS:  MEDICATIONS  (STANDING):  atorvastatin 20 milliGRAM(s) Oral at bedtime  chlorhexidine 0.12% Liquid 15 milliLiter(s) Oral Mucosa two times a day  chlorhexidine 2% Cloths 1 Application(s) Topical <User Schedule>  dexMEDEtomidine Infusion 0.2 MICROgram(s)/kG/Hr (2.44 mL/Hr) IV Continuous <Continuous>  dextrose 5%. 1000 milliLiter(s) (50 mL/Hr) IV Continuous <Continuous>  dextrose 5%. 1000 milliLiter(s) (100 mL/Hr) IV Continuous <Continuous>  dextrose 50% Injectable 25 Gram(s) IV Push once  dextrose 50% Injectable 12.5 Gram(s) IV Push once  dextrose 50% Injectable 25 Gram(s) IV Push once  enoxaparin Injectable 40 milliGRAM(s) SubCutaneous every 24 hours  fentaNYL   Infusion. 0.5 MICROgram(s)/kG/Hr (2.44 mL/Hr) IV Continuous <Continuous>  glucagon  Injectable 1 milliGRAM(s) IntraMuscular once  insulin lispro (ADMELOG) corrective regimen sliding scale   SubCutaneous four times a day with meals  levothyroxine 75 MICROGram(s) Oral daily  midodrine 10 milliGRAM(s) Oral every 8 hours  mupirocin 2% Ointment 1 Application(s) Topical two times a day  norepinephrine Infusion 0.05 MICROgram(s)/kG/Min (2.28 mL/Hr) IV Continuous <Continuous>  OLANZapine 2.5 milliGRAM(s) Oral at bedtime  pantoprazole   Suspension 40 milliGRAM(s) Oral daily  senna 2 Tablet(s) Oral at bedtime    MEDICATIONS  (PRN):  acetaminophen     Tablet .. 650 milliGRAM(s) Oral every 6 hours PRN Temp greater or equal to 38C (100.4F), Mild Pain (1 - 3)  dextrose Oral Gel 15 Gram(s) Oral once PRN Blood Glucose LESS THAN 70 milliGRAM(s)/deciliter      HOME MEDICATIONS:  Home Medications:  ascorbic acid 500 mg oral tablet: 1 tab(s) orally 2 times a day (07 Sep 2022 15:52)  docusate sodium 100 mg oral tablet: 1 tab(s) orally 2 times a day (07 Sep 2022 15:52)  lactulose 10 g oral powder for reconstitution: 30 milliliter(s) orally 2 times a day, As Needed for constipation (07 Sep 2022 15:52)  Lipitor 20 mg oral tablet: 1 tab(s) orally once a day (07 Sep 2022 15:52)  Melatonin 3 mg oral tablet: 1 tab(s) orally once a day (at bedtime) (07 Sep 2022 15:52)  Multiple Vitamins oral tablet: 1 tab(s) orally once a day (07 Sep 2022 15:52)  Synthroid 75 mcg (0.075 mg) oral tablet: 1 tab(s) orally once a day (07 Sep 2022 15:52)        OBJECTIVE:  ICU Vital Signs Last 24 Hrs  T(C): 35.9 (22 Sep 2022 04:00), Max: 36.2 (21 Sep 2022 08:00)  T(F): 96.6 (22 Sep 2022 04:00), Max: 97.2 (21 Sep 2022 08:00)  HR: 65 (22 Sep 2022 07:00) (41 - 129)  BP: 119/58 (22 Sep 2022 07:00) (70/51 - 143/63)  BP(mean): 84 (22 Sep 2022 07:00) (57 - 113)  ABP: --  ABP(mean): --  RR: 34 (22 Sep 2022 07:00) (15 - 56)  SpO2: 100% (22 Sep 2022 07:00) (97% - 100%)    O2 Parameters below as of 22 Sep 2022 07:00  Patient On (Oxygen Delivery Method): ventilator    O2 Concentration (%): 40      Mode: AC/ CMV (Assist Control/ Continuous Mandatory Ventilation)  RR (machine): 14  TV (machine): 350  FiO2: 40  PEEP: 5  ITime: 1  MAP: 8  PIP: 22        21 Sep 2022 07:01  -  22 Sep 2022 07:00  --------------------------------------------------------  IN: 2179 mL / OUT: 1465 mL / NET: 714 mL      Daily     Daily Weight in k.3 (22 Sep 2022 06:00)    PHYSICAL EXAM:  NEURO: patient is awake , alert and oriented  GEN: Not in acute distress  NECK: no thyroid enlargement, no JVD  LUNGS: Clear to auscultation bilaterally   CARDIOVASCULAR: S1/S2 present, RRR , no murmurs or rubs, no carotid bruits,  + PP bilaterally  ABD: Soft, non-tender, non-distended, +BS  EXT: No KRISTOPHER  SKIN: Intact  ACCESS Site:    LABS:                        9.6    8.10  )-----------( 320      ( 22 Sep 2022 05:07 )             30.6     -22    140  |  101  |  46<H>  ----------------------------<  163<H>  4.5   |  32  |  0.7    Ca    8.9      22 Sep 2022 05:07  Phos  2.5       Mg     1.8         TPro  5.3<L>  /  Alb  3.2<L>  /  TBili  0.3  /  DBili  x   /  AST  20  /  ALT  29  /  AlkPhos  113            RADIOLOGY:      ECG:    TELEMETRY EVENTS:

## 2022-09-22 NOTE — PROGRESS NOTE ADULT - ATTENDING COMMENTS
IMPRESSION:    Acute hypercapnic respiratory failure  Acute hypoxemic respiratory failure  THERESA with decreased urine output  H/o Hypothyroidism  Altered mental status   SIRS    Impression and plan are my own.
IMPRESSION:    Acute hypercapnic respiratory failure  Acute hypoxemic respiratory failure  THERESA with decreased urine output - resolved   H/o Hypothyroidism  Altered mental status   Acute Toxic Metabolic Encephalopathy  SIRS  LLL opacity/pneumonia    Impression and plan are my own.
Ms Martinez is an elderly woman with recent hip fracture intubated for hypoxia and septic shock due to pneumonia.  She continues to have difficulty with extubation, failed SBT due to mental status issues.  Will try to extubate while running precedex to limit agitation.  I agree with the resident note, with the exceptions listed in my attestation above.  The remainder of impression and plan per resident note.
Ms Martinez remains critically ill with difficulty extubating due to persistent agitation and failling SBT.  Continue to diurese and maintain very minimal sedation to avoid agitation.  Daily SAT & SBT.  Discussions regarding trache w/ family due to poor SBT performance.    I agree with the resident note, with the exceptions listed in my attestation above.  The remainder of impression and plan per resident note.

## 2022-09-22 NOTE — PROGRESS NOTE ADULT - ASSESSMENT
Assessment	  IMPRESSION:    Acute hypercapnic respiratory failure  Acute hypoxemic respiratory failure  THERESA with decreased urine output - resolved   H/o Hypothyroidism  Altered mental status   Acute Toxic Metabolic Encephalopathy  SIRS  LLL opacity/pneumonia    PLAN:    CNS: Following commands. Agitated and back on sedation:   - Too bradycardiac on Precedex - hold  - On Fentanyl  - Was given 6mg Versed for agitation     HEENT: Oral care; ETT care. CTS eval for possible tach if family agrees.     PULMONARY:  Increase RR to 14. CXR reviewed with no new infiltrates; left basilar opacity. She has failed multiple SBTs and was actually extubated and reintubated last week. At this point recommend tracheostomy placement if the family is agreeable to that.     CARDIOVASCULAR:  Keep equal balance; midodrine 10mg [id number].   - Levophed     GI: Tolerating feeding. GI prophylaxis.     RENAL: Follow up lytes. Correct as needed. Patrick catheter. Kidney function back to baseline. Keep equal balance.     INFECTIOUS DISEASE: Leukocytosis resolved. UA negative. Cultures negative. COVID- resolved.    HEMATOLOGICAL: DVT prophylaxis: Lovenox SQ.     ENDOCRINE: Follow up FS. Insulin protocol if needed.     MUSCULOSKELETAL: Bed in chair position.    DISPO: Critically ill, back on the ventilator, failed SBTs,  - will need tracheostomy placement vs palliative liberation from the ventilator.

## 2022-09-23 NOTE — PROGRESS NOTE ADULT - ASSESSMENT
IMP:  89yo F w/ PMHx of HTN, HLD, hypothyroidism, recent left femur fracture s/p surgery 1 month PTA in [facility], presented to ED from [facility] with hypoxia and hypotension. Intubated 9/7 and failed SBT on 9/9.    - no longer having loose BMs, bowel preps adjusted, cont to receive hydrolyzed Rx  - continue Peptamen AF, but decrease to 360 mL 3x per day (provides 27 kcal/kg/day)   - d/w resident - to repeat phos and correct hypophosphatemia  - if propofol dose needs to be increased, will need to change enteral Rx - d/w medical team    Per pulmonary attending: Critically ill, back on the ventilator, failed SBTs, hypotensive on pressors. She will need tracheostomy placement vs palliative liberation from the ventilator.   - will continue to follow

## 2022-09-23 NOTE — PROGRESS NOTE ADULT - SUBJECTIVE AND OBJECTIVE BOX
HPI:  89yo F w/ pmhx of HTN, HLD, hypothyroidism, recent left femur fracture s/p surgery 1mo in [facility] presented to ED from [facility] with hypoxia and hypotension. Patient was saturating 50% on room air and improved only minimally on NRB in the ED. History was not able to be obtained by the patient as she was unresponsive and she was intubated for airway protection. Collateral HPI obtained by daughter Becky who said she was sent to the NH for rehab for her hip fracture. She denies any history of dementia. Baseline mental status is AAOx3 and prior to hip fracture patient was living by herself.     Of note patient was being treated in NH for R sided pneumonia (CXR 9/6: R perihilar infiltrate) and was receiving levaquin.     ED course:  vitals: /84, HR 73, hypothermic T 93s, O2 100% on BVM  labs: WBC 14.96, trop 0.04, , ABG pH 7.09, PCO2 93, PO2 51  imaging:   - CXR: wet read: wnl   - CT angio chest PE: no PE, lungs wnl   - CT head: No acute intracranial pathology. No evidence of midline shift, mass effect or intracranial hemorrhage.  given: vanc, cefepime, LR 1800ml  (07 Sep 2022 15:40)     INTERVAL EVENTS:    ADVANCE DIRECTIVES:    DNR  MOLST  [ ]  Living Will  [ ]   DECISION MAKER(s):  [ ] Health Care Proxy(s)  [ ] Surrogate(s)  [ ] Guardian           Name(s): Phone Number(s):    BASELINE (I)ADL(s) (prior to admission):  [place]: [ ]Total  [ ] Moderate [ ]Dependent  Palliative Performance Status Version 2:         %    http://npcrc.org/files/news/palliative_performance_scale_ppsv2.pdf    Allergies    penicillin (Unknown)    Intolerances    MEDICATIONS  (STANDING):  atorvastatin 20 milliGRAM(s) Oral at bedtime  chlorhexidine 0.12% Liquid 15 milliLiter(s) Oral Mucosa two times a day  chlorhexidine 2% Cloths 1 Application(s) Topical <User Schedule>  dextrose 5%. 1000 milliLiter(s) (50 mL/Hr) IV Continuous <Continuous>  dextrose 5%. 1000 milliLiter(s) (100 mL/Hr) IV Continuous <Continuous>  dextrose 50% Injectable 25 Gram(s) IV Push once  dextrose 50% Injectable 12.5 Gram(s) IV Push once  dextrose 50% Injectable 25 Gram(s) IV Push once  enoxaparin Injectable 40 milliGRAM(s) SubCutaneous every 24 hours  fentaNYL   Infusion. 0.5 MICROgram(s)/kG/Hr (2.44 mL/Hr) IV Continuous <Continuous>  glucagon  Injectable 1 milliGRAM(s) IntraMuscular once  insulin lispro (ADMELOG) corrective regimen sliding scale   SubCutaneous four times a day with meals  levothyroxine 75 MICROGram(s) Oral daily  midodrine 20 milliGRAM(s) Oral every 8 hours  mupirocin 2% Ointment 1 Application(s) Topical two times a day  norepinephrine Infusion 0.05 MICROgram(s)/kG/Min (2.28 mL/Hr) IV Continuous <Continuous>  pantoprazole   Suspension 40 milliGRAM(s) Oral daily  polyethylene glycol 3350 17 Gram(s) Oral two times a day  propofol Infusion 10 MICROgram(s)/kG/Min (2.92 mL/Hr) IV Continuous <Continuous>  senna 2 Tablet(s) Oral at bedtime    MEDICATIONS  (PRN):  acetaminophen     Tablet .. 650 milliGRAM(s) Oral every 6 hours PRN Temp greater or equal to 38C (100.4F), Mild Pain (1 - 3)  dextrose Oral Gel 15 Gram(s) Oral once PRN Blood Glucose LESS THAN 70 milliGRAM(s)/deciliter    PRESENT SYMPTOMS: [ ]Unable to obtain due to poor mentation   Source if other than patient:  [ ]Family   [ ]Team     Pain: [ ]yes [ ]no  QOL impact -   Location -                    Aggravating factors -  Quality -  Radiation -  Timing-  Severity (0-10 scale):  Minimal acceptable level (0-10 scale):     CPOT:    https://www.sccm.org/getattachment/vrg18f48-2t2d-[email or web address]/Critical-Care-Pain-Observation-Tool-(CPOT)      PAIN AD Score:     http://[email or web address]/[email or web address].pdf (press ctrl +  left click to view)    Dyspnea:                           [ ]Mild [ ]Moderate [ ]Severe  Anxiety:                             [ ]Mild [ ]Moderate [ ]Severe  Fatigue:                             [ ]Mild [ ]Moderate [ ]Severe  Nausea:                             [ ]Mild [ ]Moderate [ ]Severe  Loss of appetite:              [ ]Mild [ ]Moderate [ ]Severe  Constipation:                    [ ]Mild [ ]Moderate [ ]Severe    Other Symptoms:  [ ]All other review of systems negative     Palliative Performance Status Version 2:         %    http://[facility].org/files/news/palliative_performance_scale_ppsv2.pdf  PHYSICAL EXAM:  Vital Signs Last 24 Hrs  T(C): 36.3 (23 Sep 2022 12:00), Max: 36.3 (23 Sep 2022 12:00)  T(F): 97.3 (23 Sep 2022 12:00), Max: 97.3 (23 Sep 2022 12:00)  HR: 93 (23 Sep 2022 12:00) (40 - 113)  BP: 142/75 (23 Sep 2022 12:00) (84/44 - 198/89)  BP(mean): 93 (23 Sep 2022 12:00) (61 - 142)  RR: 32 (23 Sep 2022 12:00) (22 - 48)  SpO2: 98% (23 Sep 2022 12:00) (98% - 100%)    Parameters below as of 23 Sep 2022 12:00  Patient On (Oxygen Delivery Method): ventilator    O2 Concentration (%): 40 I&O's Summary    22 Sep 2022 07:01  -  23 Sep 2022 07:00  --------------------------------------------------------  IN: 1712.8 mL / OUT: 2145 mL / NET: -432.2 mL    23 Sep 2022 07:01  -  23 Sep 2022 13:16  --------------------------------------------------------  IN: 138.9 mL / OUT: 80 mL / NET: 58.9 mL      GENERAL:  [ ]Alert  [ ]Oriented x   [ ]Lethargic  [ ]Cachexia  [ ]Unarousable  [ ]Verbal  [ ]Non-Verbal  Behavioral:   [ ] Anxiety  [ ] Delirium [ ] Agitation [ ] Other  HEENT:  [ ]Normal   [ ]Dry mouth   [x ]ET Tube/Trach  [ ]Oral lesions  PULMONARY:   [ ]Clear [x ]Tachypnea  [ ]Audible excessive secretions   [ ]Rhonchi        [ ]Right [ ]Left [ ]Bilateral  [ ]Crackles        [ ]Right [ ]Left [ ]Bilateral  [ ]Wheezing     [ ]Right [ ]Left [ ]Bilateral  [ ]Diminished breath sounds [ ]right [ ]left [ ]bilateral  CARDIOVASCULAR:    [x ]Regular [ ]Irregular [ ]Tachy  [ ]Favian [ ]Murmur [ ]Other  GASTROINTESTINAL:  [ ]Soft  [ ]Distended   [ ]+BS  [ ]Non tender [ ]Tender  [ ]PEG xOGT/ NGT  Last BM:   GENITOURINARY:  [ ]Normal [ ] Incontinent   [ ]Oliguria/Anuria   [ x]Patrick  MUSCULOSKELETAL:   [x ]Normal   [ ]Weakness  [ ]Bed/Wheelchair bound [ ]Edema  NEUROLOGIC: unlatoriable to assess - sedated on ventilator  [ ]No focal deficits  [ ]Cognitive impairment  [ ]Dysphagia [ ]Dysarthria [ ]Paresis [ ]Other   SKIN:   [ ]Normal    [ ]Rash  [ ]Pressure ulcer(s)       Present on admission [ ]y [ ]n    CRITICAL CARE:  [ ] Shock Present  [ x]Septic [ ]Cardiogenic [ ]Neurologic [ ]Hypovolemic  [ ]  Vasopressors [ ]  Inotropes   [x ]Respiratory failure present [ x]Mechanical ventilation [ ]Non-invasive ventilatory support [ ]High flow  [ ]Acute  [ ]Chronic [ ]Hypoxic  [ ]Hypercarbic [ ]Other  [ ]Other organ failure         LABS:                        9.5    6.54  )-----------( 299      ( 23 Sep 2022 05:05 )             30.0   09-23    145  |  104  |  44<H>  ----------------------------<  124<H>  4.3   |  34<H>  |  0.6<L>    Ca    9.1      23 Sep 2022 05:05  Phos  2.5     09-22  Mg     2.2     09-23    TPro  5.2<L>  /  Alb  2.9<L>  /  TBili  0.3  /  DBili  x   /  AST  18  /  ALT  23  /  AlkPhos  100  09-23        RADIOLOGY & ADDITIONAL STUDIES:    PROTEIN CALORIE MALNUTRITION PRESENT: [ ]mild [ ]moderate [ ]severe [ ]underweight [ ]morbid obesity  https://www.andeal.org/vault/2440/web/files/ONC/Table_Clinical%20Characteristics%20to%20Document%20Malnutrition-White%20JV%20et%20al%202012.pdf    Height (cm): 152.4 (09-08-22 @ 05:00)  Weight (kg): 48.7 (09-08-22 @ 05:00)  BMI (kg/m2): 21 (09-08-22 @ 05:00)    [ ]PPSV2 < or = to 30% [ ]significant weight loss  [ ]poor nutritional intake  [ ]anasarca      [ ]Artificial Nutrition      REFERRALS:   [ ]Chaplaincy  [ ]Hospice  [ ]Child Life  [ ]Social Work  [ ]Case management [ ]Holistic Therapy     Goals of Care Document:          HPI:  89yo F w/ pmhx of HTN, HLD, hypothyroidism, recent left femur fracture s/p surgery 1mo in [facility] presented to ED from [facility] with hypoxia and hypotension. Patient was saturating 50% on room air and improved only minimally on NRB in the ED. History was not able to be obtained by the patient as she was unresponsive and she was intubated for airway protection. Collateral HPI obtained by daughter Becky who said she was sent to the NH for rehab for her hip fracture. She denies any history of dementia. Baseline mental status is AAOx3 and prior to hip fracture patient was living by herself.  (07 Sep 2022 15:40)     INTERVAL EVENTS:  patient not in distress    ADVANCE DIRECTIVES:    DNR  MOLST  [ ]  Living Will  [ ]   DECISION MAKER(s):  [ ] Health Care Proxy(s)  [ ] Surrogate(s)  [ ] Guardian           Name(s): Phone Number(s):    BASELINE (I)ADL(s) (prior to admission):  [place]: [ ]Total  [ ] Moderate [ ]Dependent  Palliative Performance Status Version 2:         %    http://npcrc.org/files/news/palliative_performance_scale_ppsv2.pdf    Allergies    penicillin (Unknown)    Intolerances    MEDICATIONS  (STANDING):  atorvastatin 20 milliGRAM(s) Oral at bedtime  chlorhexidine 0.12% Liquid 15 milliLiter(s) Oral Mucosa two times a day  chlorhexidine 2% Cloths 1 Application(s) Topical <User Schedule>  dextrose 5%. 1000 milliLiter(s) (50 mL/Hr) IV Continuous <Continuous>  dextrose 5%. 1000 milliLiter(s) (100 mL/Hr) IV Continuous <Continuous>  dextrose 50% Injectable 25 Gram(s) IV Push once  dextrose 50% Injectable 12.5 Gram(s) IV Push once  dextrose 50% Injectable 25 Gram(s) IV Push once  enoxaparin Injectable 40 milliGRAM(s) SubCutaneous every 24 hours  fentaNYL   Infusion. 0.5 MICROgram(s)/kG/Hr (2.44 mL/Hr) IV Continuous <Continuous>  glucagon  Injectable 1 milliGRAM(s) IntraMuscular once  insulin lispro (ADMELOG) corrective regimen sliding scale   SubCutaneous four times a day with meals  levothyroxine 75 MICROGram(s) Oral daily  midodrine 20 milliGRAM(s) Oral every 8 hours  mupirocin 2% Ointment 1 Application(s) Topical two times a day  norepinephrine Infusion 0.05 MICROgram(s)/kG/Min (2.28 mL/Hr) IV Continuous <Continuous>  pantoprazole   Suspension 40 milliGRAM(s) Oral daily  polyethylene glycol 3350 17 Gram(s) Oral two times a day  propofol Infusion 10 MICROgram(s)/kG/Min (2.92 mL/Hr) IV Continuous <Continuous>  senna 2 Tablet(s) Oral at bedtime    MEDICATIONS  (PRN):  acetaminophen     Tablet .. 650 milliGRAM(s) Oral every 6 hours PRN Temp greater or equal to 38C (100.4F), Mild Pain (1 - 3)  dextrose Oral Gel 15 Gram(s) Oral once PRN Blood Glucose LESS THAN 70 milliGRAM(s)/deciliter    PRESENT SYMPTOMS: [ X]Unable to obtain due to poor mentation   Source if other than patient:  [ ]Family   [ ]Team     Pain: [ ]yes [ ]no  QOL impact -   Location -                    Aggravating factors -  Quality -  Radiation -  Timing-  Severity (0-10 scale):  Minimal acceptable level (0-10 scale):     CPOT:    https://www.[facility].org/getattachment/ytu09g25-5l0d-2c2x-3x2y-8781g7034v7s/Critical-Care-Pain-Observation-Tool-(CPOT)      PAIN AD Score: 0    http://geriatrictoolkit.[email or web address].[email or web address]/cog/painad.pdf (press ctrl +  left click to view)    Dyspnea:                           [ ]Mild [ ]Moderate [ ]Severe  Anxiety:                             [ ]Mild [ ]Moderate [ ]Severe  Fatigue:                             [ ]Mild [ ]Moderate [ ]Severe  Nausea:                             [ ]Mild [ ]Moderate [ ]Severe  Loss of appetite:              [ ]Mild [ ]Moderate [ ]Severe  Constipation:                    [ ]Mild [ ]Moderate [ ]Severe    Other Symptoms:  [ ]All other review of systems negative     Palliative Performance Status Version 2:         %    http://npcrc.org/files/news/palliative_performance_scale_ppsv2.pdf  PHYSICAL EXAM:  Vital Signs Last 24 Hrs  T(C): 36.3 (23 Sep 2022 12:00), Max: 36.3 (23 Sep 2022 12:00)  T(F): 97.3 (23 Sep 2022 12:00), Max: 97.3 (23 Sep 2022 12:00)  HR: 93 (23 Sep 2022 12:00) (40 - 113)  BP: 142/75 (23 Sep 2022 12:00) (84/44 - 198/89)  BP(mean): 93 (23 Sep 2022 12:00) (61 - 142)  RR: 32 (23 Sep 2022 12:00) (22 - 48)  SpO2: 98% (23 Sep 2022 12:00) (98% - 100%)    Parameters below as of 23 Sep 2022 12:00  Patient On (Oxygen Delivery Method): ventilator    O2 Concentration (%): 40 I&O's Summary    22 Sep 2022 07:01  -  23 Sep 2022 07:00  --------------------------------------------------------  IN: 1712.8 mL / OUT: 2145 mL / NET: -432.2 mL    23 Sep 2022 07:01  -  23 Sep 2022 13:16  --------------------------------------------------------  IN: 138.9 mL / OUT: 80 mL / NET: 58.9 mL      GENERAL:  [ ]Alert  [ ]Oriented x   [ ]Lethargic  [ ]Cachexia  [ ]Unarousable  [ ]Verbal  [x ]Non-Verbal  Behavioral:   [ ] Anxiety  [ ] Delirium [ ] Agitation [x ] Other calm  HEENT:  [ ]Normal   [ ]Dry mouth   [x ]ET Tube/Trach  [ ]Oral lesions  PULMONARY:   [ ]Clear [x ]Tachypnea  [ ]Audible excessive secretions   [ ]Rhonchi        [ ]Right [ ]Left [ ]Bilateral  [ ]Crackles        [ ]Right [ ]Left [ ]Bilateral  [ ]Wheezing     [ ]Right [ ]Left [ ]Bilateral  [ ]Diminished breath sounds [ ]right [ ]left [ ]bilateral  CARDIOVASCULAR:    [x ]Regular [ ]Irregular [ ]Tachy  [ ]Favian [ ]Murmur [ ]Other  GASTROINTESTINAL:  [ ]Soft  [ ]Distended   [ ]+BS  [ ]Non tender [ ]Tender  [ ]PEG xOGT/ NGT  Last BM:   GENITOURINARY:  [ ]Normal [ ] Incontinent   [ ]Oliguria/Anuria   [ x]Patrick  MUSCULOSKELETAL:   [x ]Normal   [ ]Weakness  [ ]Bed/Wheelchair bound [ ]Edema  NEUROLOGIC: unlatoriable to assess - sedated on ventilator  [ ]No focal deficits  [ ]Cognitive impairment  [ ]Dysphagia [ ]Dysarthria [ ]Paresis [ ]Other   SKIN:   [ ]Normal    [ ]Rash  [ ]Pressure ulcer(s)       Present on admission [ ]y [ ]n    CRITICAL CARE:  [ ] Shock Present  [ x]Septic [ ]Cardiogenic [ ]Neurologic [ ]Hypovolemic  [ ]  Vasopressors [ ]  Inotropes   [x ]Respiratory failure present [ x]Mechanical ventilation [ ]Non-invasive ventilatory support [ ]High flow  [ ]Acute  [ ]Chronic [ ]Hypoxic  [ ]Hypercarbic [ ]Other  [ ]Other organ failure         LABS: reviewed                        9.5    6.54  )-----------( 299      ( 23 Sep 2022 05:05 )             30.0   09-23    145  |  104  |  44<H>  ----------------------------<  124<H>  4.3   |  34<H>  |  0.6<L>    Ca    9.1      23 Sep 2022 05:05  Phos  2.5     09-22  Mg     2.2     09-23    TPro  5.2<L>  /  Alb  2.9<L>  /  TBili  0.3  /  DBili  x   /  AST  18  /  ALT  23  /  AlkPhos  100  09-23        RADIOLOGY & ADDITIONAL STUDIES:    PROTEIN CALORIE MALNUTRITION PRESENT: [ ]mild [ ]moderate [ ]severe [ ]underweight [ ]morbid obesity  https://www.andeal.org/vault/2440/web/files/ONC/Table_Clinical%20Characteristics%20to%20Document%20Malnutrition-White%20JV%20et%20al%202012.pdf    Height (cm): 152.4 (09-08-22 @ 05:00)  Weight (kg): 48.7 (09-08-22 @ 05:00)  BMI (kg/m2): 21 (09-08-22 @ 05:00)    [ ]PPSV2 < or = to 30% [ ]significant weight loss  [ ]poor nutritional intake  [ ]anasarca      [ ]Artificial Nutrition      REFERRALS:   [ ]Chaplaincy  [ ]Hospice  [ ]Child Life  [ ]Social Work  [ ]Case management [ ]Holistic Therapy     Goals of Care Document:

## 2022-09-23 NOTE — PROGRESS NOTE ADULT - ASSESSMENT
Assessment	  IMPRESSION:    Acute hypercapnic respiratory failure  Acute hypoxemic respiratory failure  THERESA with decreased urine output - resolved   H/o Hypothyroidism  Altered mental status   Acute Toxic Metabolic Encephalopathy  SIRS  LLL opacity/pneumonia    PLAN:    CNS: Following commands. Agitated and back on sedation:   - SAT Daily  - Patient is on proprofol and fentanyl for agitation and sedation; Pending family to make final decision about comfort measures  - Zyprexa discontinued due to prolonged QTc    HEENT: Oral care; ETT care. CTS eval for possible tach if family agrees.     PULMONARY:  Has failed multiple SBTs and was actually extubated and reintubated last week.   - Patient remains on a ventilator  - Failed SBT due to low tidal volume and agitation  - Waiting for family to make final decision on comfort measures     CARDIOVASCULAR:  Keep equal balance  - midodrine 20mg H2wmteq.   - Levophed; wean off pressors  - EKG later today for prolonged QTc    GI: Tolerating feeding. GI prophylaxis.     RENAL: Follow up lytes. Correct as needed. Patrick catheter. Kidney function back to baseline. Keep equal balance.     INFECTIOUS DISEASE: Leukocytosis resolved. UA negative. Cultures negative. COVID- resolved.    HEMATOLOGICAL: DVT prophylaxis: Lovenox SQ.     ENDOCRINE: Follow up FS. Insulin protocol if needed.     MUSCULOSKELETAL: Bed in chair position.    DISPO: Critically ill, back on the ventilator, failed SBTs,  -Patient is now DNR; Family is going to make patient CMO in the next day or two.

## 2022-09-23 NOTE — PROGRESS NOTE ADULT - SUBJECTIVE AND OBJECTIVE BOX
HPI:  89yo F w/ pmhx of HTN, HLD, hypothyroidism, recent left femur fracture s/p surgery 1mo in [facility] presented to ED from [facility] with hypoxia and hypotension. Patient was saturating 50% on room air and improved only minimally on NRB in the ED. History was not able to be obtained by the patient as she was unresponsive and she was intubated for airway protection. Collateral HPI obtained by daughter Becky who said she was sent to the NH for rehab for her hip fracture. She denies any history of dementia. Baseline mental status is AAOx3 and prior to hip fracture patient was living by herself.     Of note patient was being treated in NH for R sided pneumonia (CXR : R perihilar infiltrate) and was receiving levaquin.         INTERVAL HISTORY:    Palliative and Pulm team met with family; patient is now DNR and will become CMO in the next day or two  Failed SBT today due to pulling low tidal volume and agitation    PAST MEDICAL & SURGICAL HISTORY  HTN (hypertension)    High cholesterol    Hypothyroid    History of colon resection    H/O: hysterectomy        ALLERGIES:  penicillin (Unknown)      MEDICATIONS:  MEDICATIONS  (STANDING):  atorvastatin 20 milliGRAM(s) Oral at bedtime  chlorhexidine 0.12% Liquid 15 milliLiter(s) Oral Mucosa two times a day  chlorhexidine 2% Cloths 1 Application(s) Topical <User Schedule>  dextrose 5%. 1000 milliLiter(s) (50 mL/Hr) IV Continuous <Continuous>  dextrose 5%. 1000 milliLiter(s) (100 mL/Hr) IV Continuous <Continuous>  dextrose 50% Injectable 25 Gram(s) IV Push once  dextrose 50% Injectable 12.5 Gram(s) IV Push once  dextrose 50% Injectable 25 Gram(s) IV Push once  enoxaparin Injectable 40 milliGRAM(s) SubCutaneous every 24 hours  fentaNYL   Infusion. 0.5 MICROgram(s)/kG/Hr (2.44 mL/Hr) IV Continuous <Continuous>  glucagon  Injectable 1 milliGRAM(s) IntraMuscular once  insulin lispro (ADMELOG) corrective regimen sliding scale   SubCutaneous four times a day with meals  levothyroxine 75 MICROGram(s) Oral daily  midodrine 20 milliGRAM(s) Oral every 8 hours  mupirocin 2% Ointment 1 Application(s) Topical two times a day  norepinephrine Infusion 0.05 MICROgram(s)/kG/Min (2.28 mL/Hr) IV Continuous <Continuous>  pantoprazole   Suspension 40 milliGRAM(s) Oral daily  polyethylene glycol 3350 17 Gram(s) Oral two times a day  propofol Infusion 10 MICROgram(s)/kG/Min (2.92 mL/Hr) IV Continuous <Continuous>  senna 2 Tablet(s) Oral at bedtime    MEDICATIONS  (PRN):  acetaminophen     Tablet .. 650 milliGRAM(s) Oral every 6 hours PRN Temp greater or equal to 38C (100.4F), Mild Pain (1 - 3)  dextrose Oral Gel 15 Gram(s) Oral once PRN Blood Glucose LESS THAN 70 milliGRAM(s)/deciliter      HOME MEDICATIONS:  Home Medications:  ascorbic acid 500 mg oral tablet: 1 tab(s) orally 2 times a day (07 Sep 2022 15:52)  docusate sodium 100 mg oral tablet: 1 tab(s) orally 2 times a day (07 Sep 2022 15:52)  lactulose 10 g oral powder for reconstitution: 30 milliliter(s) orally 2 times a day, As Needed for constipation (07 Sep 2022 15:52)  Lipitor 20 mg oral tablet: 1 tab(s) orally once a day (07 Sep 2022 15:52)  Melatonin 3 mg oral tablet: 1 tab(s) orally once a day (at bedtime) (07 Sep 2022 15:52)  Multiple Vitamins oral tablet: 1 tab(s) orally once a day (07 Sep 2022 15:52)  Synthroid 75 mcg (0.075 mg) oral tablet: 1 tab(s) orally once a day (07 Sep 2022 15:52)        OBJECTIVE:  ICU Vital Signs Last 24 Hrs  T(C): 36.1 (23 Sep 2022 08:00), Max: 36.2 (22 Sep 2022 16:00)  T(F): 97 (23 Sep 2022 08:00), Max: 97.2 (22 Sep 2022 16:00)  HR: 97 (23 Sep 2022 11:00) (40 - 113)  BP: 195/79 (23 Sep 2022 11:00) (84/44 - 198/89)  BP(mean): 142 (23 Sep 2022 11:00) (61 - 142)  ABP: --  ABP(mean): --  RR: 22 (23 Sep 2022 11:00) (22 - 48)  SpO2: 100% (23 Sep 2022 11:00) (98% - 100%)    O2 Parameters below as of 23 Sep 2022 11:00  Patient On (Oxygen Delivery Method): ventilator    O2 Concentration (%): 40      Mode: AC/ CMV (Assist Control/ Continuous Mandatory Ventilation)  RR (machine): 14  TV (machine): 350  FiO2: 40  PEEP: 5      22 Sep 2022 07:01  -  23 Sep 2022 07:00  --------------------------------------------------------  IN: 1712.8 mL / OUT: 2145 mL / NET: -432.2 mL    23 Sep 2022 07:01  -  23 Sep 2022 11:52  --------------------------------------------------------  IN: 109.5 mL / OUT: 65 mL / NET: 44.5 mL      Daily     Daily Weight in k.5 (23 Sep 2022 04:00)    PHYSICAL EXAM:  NEURO: patient is intubated but arousable and agitated when off of sedation  GEN: Not in acute distress  NECK: no thyroid enlargement, no JVD  LUNGS: Clear to auscultation bilaterally   CARDIOVASCULAR: S1/S2 present, RRR , no murmurs or rubs, no carotid bruits,  + PP bilaterally  ABD: Soft, non-tender, non-distended, +BS  EXT: No KRISTOPHER  SKIN: Intact  ACCESS Site:    LABS:                        9.5    6.54  )-----------( 299      ( 23 Sep 2022 05:05 )             30.0     09-23    145  |  104  |  44<H>  ----------------------------<  124<H>  4.3   |  34<H>  |  0.6<L>    Ca    9.1      23 Sep 2022 05:05  Phos  2.5     09-22  Mg     2.2     09-    TPro  5.2<L>  /  Alb  2.9<L>  /  TBili  0.3  /  DBili  x   /  AST  18  /  ALT  23  /  AlkPhos  100          RADIOLOGY:    ECG:    TELEMETRY EVENTS:

## 2022-09-23 NOTE — PROGRESS NOTE ADULT - ASSESSMENT
89yFemale being evaluated for goals of care  and symptom management. Vented sedated and restrained. Daughters at bedside for meeting. Failing all SBTs. Today was apneic during breathing trial that lasted 2 minutes       MEDD (morphine equivalent daily dose): fentanyl gtt      See Recs below.    Please call [phone] with questions or concerns 24/7.   We will continue to follow.

## 2022-09-23 NOTE — PROGRESS NOTE ADULT - SUBJECTIVE AND OBJECTIVE BOX
Patient is a 89y old  Female who presents with a chief complaint of AMS, respiratory failure (22 Sep 2022 08:35)        Over Night Events:     No fevers   Remains intubated   Remains on levophed   Agitated off sedation         ROS:  See HPI    PHYSICAL EXAM    ICU Vital Signs Last 24 Hrs  T(C): 36.1 (23 Sep 2022 04:00), Max: 36.2 (22 Sep 2022 08:00)  T(F): 96.9 (23 Sep 2022 04:00), Max: 97.2 (22 Sep 2022 08:00)  HR: 42 (23 Sep 2022 07:00) (40 - 111)  BP: 119/57 (23 Sep 2022 07:00) (61/32 - 165/71)  BP(mean): 82 (23 Sep 2022 07:00) (41 - 102)  ABP: --  ABP(mean): --  RR: 31 (23 Sep 2022 07:00) (27 - 48)  SpO2: 100% (23 Sep 2022 07:00) (100% - 100%)    O2 Parameters below as of 23 Sep 2022 07:00  Patient On (Oxygen Delivery Method): ventilator    O2 Concentration (%): 40        General: intubated  HEENT: MIC             Lymphatic system: No cervical LN   Lungs: Bilateral BS, coarse  Cardiovascular: Regular   Gastrointestinal: Soft, Positive BS  Extremities: No clubbing.  Moves extremities.  Full Range of motion   Skin: Warm, intact  Neurological: No motor or sensory deficit; moves all 4 ext       09-22-22 @ 07:01  -  09-23-22 @ 07:00  --------------------------------------------------------  IN:    Enteral Tube Flush: 100 mL    FentaNYL: 501.1 mL    Norepinephrine: 213.7 mL    Peptamen A.F.: 720 mL    Propofol: 178 mL  Total IN: 1712.8 mL    OUT:    Indwelling Catheter - Urethral (mL): 2145 mL  Total OUT: 2145 mL    Total NET: -432.2 mL      09-23-22 @ 07:01  -  09-23-22 @ 07:59  --------------------------------------------------------  IN:    FentaNYL: 23.9 mL    Norepinephrine: 9.1 mL    Propofol: 8 mL  Total IN: 41 mL    OUT:  Total OUT: 0 mL    Total NET: 41 mL          LABS:                            9.5    6.54  )-----------( 299      ( 23 Sep 2022 05:05 )             30.0                                               09-23    145  |  104  |  44<H>  ----------------------------<  124<H>  4.3   |  34<H>  |  0.6<L>    Ca    9.1      23 Sep 2022 05:05  Phos  2.5     09-22  Mg     2.2     09-23    TPro  5.2<L>  /  Alb  2.9<L>  /  TBili  0.3  /  DBili  x   /  AST  18  /  ALT  23  /  AlkPhos  100  09-23                                                                                           LIVER FUNCTIONS - ( 23 Sep 2022 05:05 )  Alb: 2.9 g/dL / Pro: 5.2 g/dL / ALK PHOS: 100 U/L / ALT: 23 U/L / AST: 18 U/L / GGT: x                                                                                               Mode: AC/ CMV (Assist Control/ Continuous Mandatory Ventilation)  RR (machine): 14  TV (machine): 350  FiO2: 40  PEEP: 5  ITime: 1  MAP: 8  PIP: 23                                      ABG - ( 23 Sep 2022 04:26 )  pH, Arterial: 7.42  pH, Blood: x     /  pCO2: 52    /  pO2: 158   / HCO3: 34    / Base Excess: 7.6   /  SaO2: 99.6                MEDICATIONS  (STANDING):  atorvastatin 20 milliGRAM(s) Oral at bedtime  chlorhexidine 0.12% Liquid 15 milliLiter(s) Oral Mucosa two times a day  chlorhexidine 2% Cloths 1 Application(s) Topical <User Schedule>  dextrose 5%. 1000 milliLiter(s) (100 mL/Hr) IV Continuous <Continuous>  dextrose 5%. 1000 milliLiter(s) (50 mL/Hr) IV Continuous <Continuous>  dextrose 50% Injectable 25 Gram(s) IV Push once  dextrose 50% Injectable 12.5 Gram(s) IV Push once  dextrose 50% Injectable 25 Gram(s) IV Push once  enoxaparin Injectable 40 milliGRAM(s) SubCutaneous every 24 hours  fentaNYL   Infusion. 0.5 MICROgram(s)/kG/Hr (2.44 mL/Hr) IV Continuous <Continuous>  glucagon  Injectable 1 milliGRAM(s) IntraMuscular once  insulin lispro (ADMELOG) corrective regimen sliding scale   SubCutaneous four times a day with meals  levothyroxine 75 MICROGram(s) Oral daily  midodrine 10 milliGRAM(s) Oral every 8 hours  mupirocin 2% Ointment 1 Application(s) Topical two times a day  norepinephrine Infusion 0.05 MICROgram(s)/kG/Min (2.28 mL/Hr) IV Continuous <Continuous>  OLANZapine 5 milliGRAM(s) Oral at bedtime  pantoprazole   Suspension 40 milliGRAM(s) Oral daily  polyethylene glycol 3350 17 Gram(s) Oral two times a day  propofol Infusion 10 MICROgram(s)/kG/Min (2.92 mL/Hr) IV Continuous <Continuous>  senna 2 Tablet(s) Oral at bedtime    MEDICATIONS  (PRN):  acetaminophen     Tablet .. 650 milliGRAM(s) Oral every 6 hours PRN Temp greater or equal to 38C (100.4F), Mild Pain (1 - 3)  dextrose Oral Gel 15 Gram(s) Oral once PRN Blood Glucose LESS THAN 70 milliGRAM(s)/deciliter      Xrays: improved left infiltrate                                                                                    ECHO

## 2022-09-23 NOTE — PROGRESS NOTE ADULT - PROBLEM SELECTOR PLAN 4
Severe - different medications tried to ease issue while on ventilator  Now on propofol IV and fentanyl IV

## 2022-09-23 NOTE — PROGRESS NOTE ADULT - ASSESSMENT
IMPRESSION:    Acute hypercapnic respiratory failure  Acute hypoxemic respiratory failure  THERESA with decreased urine output - resolved   H/o Hypothyroidism  Altered mental status   Acute Toxic Metabolic Encephalopathy  SIRS  LLL opacity/pneumonia    PLAN:    CNS: Following commands. Agitated and back on sedation: Fentanyl. Precedex dc'd fro bradycardia; Wean off propofol. SAT daily. DC olanzapine. EKG in the afternoon.     HEENT: Oral care; ETT care. CTS eval for possible tach if family agrees, family meeting planned for today.    PULMONARY:  Keep same vent settings. She has failed multiple SBTs and was actually extubated and reintubated last week. At this point recommend tracheostomy placement if the family is agreeable to that. CXR improved after 1 dose of lasix.     CARDIOVASCULAR:  Keep equal balance. Back on low dose Levophed. midodrine 20mg T5dyxsp.     GI: Tolerating feeding. GI prophylaxis.  Bowel regimen. Lactic is normal. LFTs normal.     RENAL: Follow up lytes. Correct as needed. DC Patrick catheter. Kidney function at baseline. Keep equal balance.     INFECTIOUS DISEASE: Leukocytosis resolved. UA negative. Cultures negative. COVID PCR positive. Completed abx course.     HEMATOLOGICAL: DVT prophylaxis: Lovenox SQ.     ENDOCRINE: Follow up FS. Insulin protocol if needed.     MUSCULOSKELETAL: Bed in chair position.    DISPO: Critically ill, back on the ventilator, failed SBTs, hypotensive on pressors. She will need tracheostomy placement vs palliative liberation from the ventilator. Prognosis is dismal. Family meeting today.

## 2022-09-23 NOTE — PROGRESS NOTE ADULT - SUBJECTIVE AND OBJECTIVE BOX
NUTRITION SUPPORT TEAM  -  PROGRESS NOTE     Interval Events:  pt seen this morning - remains on the vent, sedated with fentanyl and min dose of propofol  right IJ c/d/i  salem oG tube  abd soft, nD  pt tolerating feeds at 375 ml x 4 feeds/d      VITALS:  T(F): 97.3 (09-23 @ 12:00), Max: 97.3 (09-23 @ 12:00)  HR: 118 (09-23 @ 18:00) (46 - 118)  BP: 128/88 (09-23 @ 18:00) (79/44 - 198/89)  RR: 39 (09-23 @ 18:00) (22 - 54)  SpO2: 100% (09-23 @ 18:00) (98% - 100%)    HEIGHT/WEIGHT/BMI:   Height (cm): 152.4 (09-08)  Weight (kg): 48.7 (09-08)  BMI (kg/m2): 21 (09-08)    I/Os:     09-22-22 @ 07:01  -  09-23-22 @ 07:00  --------------------------------------------------------  IN:    Enteral Tube Flush: 100 mL    FentaNYL: 501.1 mL    Norepinephrine: 213.7 mL    Peptamen A.F.: 720 mL    Propofol: 178 mL  Total IN: 1712.8 mL    OUT:    Indwelling Catheter - Urethral (mL): 2145 mL  Total OUT: 2145 mL    Total NET: -432.2 mL    STANDING MEDICATIONS:   atorvastatin 20 milliGRAM(s) Oral at bedtime  chlorhexidine 0.12% Liquid 15 milliLiter(s) Oral Mucosa two times a day  chlorhexidine 2% Cloths 1 Application(s) Topical <User Schedule>  enoxaparin Injectable 40 milliGRAM(s) SubCutaneous every 24 hours  fentaNYL   Infusion. 0.5 MICROgram(s)/kG/Hr IV Continuous <Continuous>  insulin lispro (ADMELOG) corrective regimen sliding scale   SubCutaneous four times a day with meals  levothyroxine 75 MICROGram(s) Oral daily  midodrine 20 milliGRAM(s) Oral every 8 hours  mupirocin 2% Ointment 1 Application(s) Topical two times a day  norepinephrine Infusion 0.05 MICROgram(s)/kG/Min IV Continuous <Continuous>  pantoprazole   Suspension 40 milliGRAM(s) Oral daily  polyethylene glycol 3350 17 Gram(s) Oral two times a day  propofol Infusion 10 MICROgram(s)/kG/Min IV Continuous <Continuous>  senna 2 Tablet(s) Oral at bedtime    Mode: AC/ CMV (Assist Control/ Continuous Mandatory Ventilation)  RR (machine): 14  TV (machine): 350  FiO2: 40  PEEP: 5  ITime: 1  MAP: 8  PIP: 20  ABG - ( 23 Sep 2022 04:26 )  pH, Arterial: 7.42  pH, Blood: x     /  pCO2: 52    /  pO2: 158   / HCO3: 34    / Base Excess: 7.6   /  SaO2: 99.6      LABS:                         9.5    6.54  )-----------( 299      ( 23 Sep 2022 05:05 )             30.0     145  |  104  |  44<H>  ----------------------------<  124<H>          (09-23-22 @ 05:05)  4.3   |  34<H>  |  0.6<L>    Ca    9.1          (09-23-22 @ 05:05)  Phos  2.5         (09-22-22 @ 05:07)  Mg     2.2         (09-23-22 @ 05:05)    TPro  5.2<L>  /  Alb  2.9<L>  /  TBili  0.3  /  DBili  x   /  AST  18  /  ALT  23  /  AlkPhos  100       09-23-22 @ 05:05    Blood Glucose (Past 24 hours):  120 mg/dL (09-23 @ 17:55)  144 mg/dL (09-23 @ 11:34)  124 mg/dL (09-23 @ 06:59)  183 mg/dL (09-22 @ 22:42)    DIET:   Diet, NPO with Tube Feed:   Tube Feeding Modality: Orogastric  Peptamen A.F. Formula  Total Volume for 24 Hours (mL): 1440  Bolus  Total Volume of Bolus (mL):  360  Tube Feed Frequency: Every 6 hours   Tube Feed Start Time: 00:00  Bolus Feed Rate (mL per Hour): 360   Bolus Feed Duration (in Hours): 24 (09-08-22 @ 23:55) [Active]

## 2022-09-24 NOTE — PROGRESS NOTE ADULT - SUBJECTIVE AND OBJECTIVE BOX
Patient is a 89y old  Female who presents with a chief complaint of AMS, respiratory failure (24 Sep 2022 04:05)        HPI:  88yo F w/ pmhx of HTN, HLD, hypothyroidism, recent left femur fracture s/p surgery 1mo in [facility] presented to ED from [facility] with hypoxia and hypotension. Patient was saturating 50% on room air and improved only minimally on NRB in the ED. History was not able to be obtained by the patient as she was unresponsive and she was intubated for airway protection. Collateral HPI obtained by daughter Becky who said she was sent to the NH for rehab for her hip fracture. She denies any history of dementia. Baseline mental status is AAOx3 and prior to hip fracture patient was living by herself.     Of note patient was being treated in NH for R sided pneumonia (CXR 9/6: R perihilar infiltrate) and was receiving levaquin.     ED course:  vitals: /84, HR 73, hypothermic T 93s, O2 100% on BVM  labs: WBC 14.96, trop 0.04, , ABG pH 7.09, PCO2 93, PO2 51  imaging:   - CXR: wet read: wnl   - CT angio chest PE: no PE, lungs wnl   - CT head: No acute intracranial pathology. No evidence of midline shift, mass effect or intracranial hemorrhage.  given: vanc, cefepime, LR 1800ml  (07 Sep 2022 15:40)      Pt evaluated on rounds.  I reviewed the radiology tests and hospital record.    I reviewed previous notes on this patient.    Interval Events: No overnight events.      CAM:    SAT:    SBT:      REVIEW OF SYSTEMS:   see HPI      OBJECTIVE:  ICU Vital Signs Last 24 Hrs  T(C): 36.6 (24 Sep 2022 04:00), Max: 36.6 (24 Sep 2022 04:00)  T(F): 97.8 (24 Sep 2022 04:00), Max: 97.8 (24 Sep 2022 04:00)  HR: 51 (24 Sep 2022 07:00) (46 - 118)  BP: 104/52 (24 Sep 2022 07:00) (79/44 - 198/89)  BP(mean): 75 (24 Sep 2022 07:00) (56 - 142)  ABP: --  ABP(mean): --  RR: 15 (24 Sep 2022 07:00) (14 - 54)  SpO2: 100% (24 Sep 2022 07:00) (98% - 100%)    O2 Parameters below as of 24 Sep 2022 07:00      O2 Concentration (%): 40      Mode: AC/ CMV (Assist Control/ Continuous Mandatory Ventilation), RR (machine): 14, TV (machine): 350, FiO2: 40, PEEP: 5, ITime: 1, MAP: 8, PIP: 20    09-23 @ 07:01  -  09-24 @ 07:00  --------------------------------------------------------  IN: 1947.8 mL / OUT: 1050 mL / NET: 897.8 mL      CAPILLARY BLOOD GLUCOSE      POCT Blood Glucose.: 176 mg/dL (24 Sep 2022 07:43)        PHYSICAL EXAM:       · ENMT:   Airway patent,   Nasal mucosa clear.  Mouth with normal mucosa.   No thrush    · EYES:   Clear bilaterally,   pupils equal,   round and reactive to light.    · CARDIAC:   Normal rate,   regular rhythm.    Heart sounds S1, S2.   No murmurs, no rubs or gallops on auscultation  no edema        CAROTID:   normal systolic impulse  no bruits    · RESPIRATORY:   rales  normal chest expansion  no retractions or use of accessory muscles  percussion of chest demonstrates no hyperresonance or dullness    · GASTROINTESTINAL:  Abdomen soft,   non-tender,   + BS  liver/spleen not palpable    · MUSCULOSKELETAL:   no clubbing, cyanosis      · SKIN:   Skin normal color for race,   warm, dry   No evidence of rash.        · HEME LYMPH:   no splenomegaly.  No cervical  lymphadenopathy.  no inguinal lymphadenopathy    HOSPITAL MEDICATIONS:  MEDICATIONS  (STANDING):  atorvastatin 20 milliGRAM(s) Oral at bedtime  chlorhexidine 0.12% Liquid 15 milliLiter(s) Oral Mucosa two times a day  chlorhexidine 2% Cloths 1 Application(s) Topical <User Schedule>  dextrose 5%. 1000 milliLiter(s) (50 mL/Hr) IV Continuous <Continuous>  dextrose 5%. 1000 milliLiter(s) (100 mL/Hr) IV Continuous <Continuous>  dextrose 50% Injectable 25 Gram(s) IV Push once  dextrose 50% Injectable 12.5 Gram(s) IV Push once  dextrose 50% Injectable 25 Gram(s) IV Push once  enoxaparin Injectable 40 milliGRAM(s) SubCutaneous every 24 hours  fentaNYL   Infusion. 0.5 MICROgram(s)/kG/Hr (2.44 mL/Hr) IV Continuous <Continuous>  glucagon  Injectable 1 milliGRAM(s) IntraMuscular once  insulin lispro (ADMELOG) corrective regimen sliding scale   SubCutaneous four times a day with meals  levothyroxine 75 MICROGram(s) Oral daily  midodrine 20 milliGRAM(s) Oral every 8 hours  mupirocin 2% Ointment 1 Application(s) Topical two times a day  norepinephrine Infusion 0.05 MICROgram(s)/kG/Min (2.28 mL/Hr) IV Continuous <Continuous>  pantoprazole   Suspension 40 milliGRAM(s) Oral daily  polyethylene glycol 3350 17 Gram(s) Oral two times a day  propofol Infusion 10 MICROgram(s)/kG/Min (2.92 mL/Hr) IV Continuous <Continuous>  senna 2 Tablet(s) Oral at bedtime    MEDICATIONS  (PRN):  acetaminophen     Tablet .. 650 milliGRAM(s) Oral every 6 hours PRN Temp greater or equal to 38C (100.4F), Mild Pain (1 - 3)  dextrose Oral Gel 15 Gram(s) Oral once PRN Blood Glucose LESS THAN 70 milliGRAM(s)/deciliter    lactated ringers Bolus:   250 milliLiter(s), IV Bolus, every 15 minutes, infuse over 10 Minute(s), Stop After 3 Doses  lactated ringers Bolus:   500 milliLiter(s), IV Bolus, once, infuse over 15 Minute(s), Stop After 1 Doses  sodium chloride 0.9% Bolus:   500 milliLiter(s), IV Bolus, once, infuse over 60 Minute(s), Stop After 1 Doses  lactated ringers Bolus:   500 milliLiter(s), IV Bolus, once, infuse over 30 Minute(s), Stop After 1 Doses  lactated ringers.: Solution, 1000 milliLiter(s) infuse at 75 mL/Hr  lactated ringers Bolus:   1800 milliLiter(s), IV Bolus, once, infuse over 60 Minute(s), Stop After 1 Doses  Provider's Contact #: [phone]      LABS:                        8.7    6.10  )-----------( 280      ( 24 Sep 2022 04:45 )             27.5     09-24    138  |  101  |  45<H>  ----------------------------<  145<H>  4.6   |  30  |  0.9    Ca    8.8      24 Sep 2022 04:45  Phos  3.4     09-24  Mg     2.0     09-24    TPro  4.9<L>  /  Alb  2.8<L>  /  TBili  0.3  /  DBili  x   /  AST  19  /  ALT  20  /  AlkPhos  91  09-24        Arterial Blood Gas:  09-24 @ 04:30  7.37/54/103/31/100.0/4.5  ABG lactate: --  Arterial Blood Gas:  09-23 @ 04:26  7.42/52/158/34/99.6/7.6  ABG lactate: --      Mode: AC/ CMV (Assist Control/ Continuous Mandatory Ventilation), RR (machine): 14, TV (machine): 350, FiO2: 40, PEEP: 5, ITime: 1, MAP: 8, PIP: 20      COVID-19 PCR: Detected (07 Sep 2022 16:55)    Mode: AC/ CMV (Assist Control/ Continuous Mandatory Ventilation)  RR (machine): 14  TV (machine): 350  FiO2: 40  PEEP: 5  ITime: 1  MAP: 8  PIP: 20      ABG - ( 24 Sep 2022 04:30 )  pH, Arterial: 7.37  pH, Blood: x     /  pCO2: 54    /  pO2: 103   / HCO3: 31    / Base Excess: 4.5   /  SaO2: 100.0               RADIOLOGY: Today I personally interpreted the latest CXR and other pertinent films.

## 2022-09-24 NOTE — PROGRESS NOTE ADULT - SUBJECTIVE AND OBJECTIVE BOX
HPI:  89yo F w/ pmhx of HTN, HLD, hypothyroidism, recent left femur fracture s/p surgery 1mo in [facility] presented to ED from [facility] with hypoxia and hypotension. Patient was saturating 50% on room air and improved only minimally on NRB in the ED. History was not able to be obtained by the patient as she was unresponsive and she was intubated for airway protection. Collateral HPI obtained by daughter Becky who said she was sent to the NH for rehab for her hip fracture. She denies any history of dementia. Baseline mental status is AAOx3 and prior to hip fracture patient was living by herself.     Of note patient was being treated in NH for R sided pneumonia (CXR : R perihilar infiltrate) and was receiving levaquin.         INTERVAL HISTORY:    Palliative and Pulm team met with family; patient is now DNR and will become CMO in the next day or two  Failed SBT today due to pulling low tidal volume and agitation    PAST MEDICAL & SURGICAL HISTORY  HTN (hypertension)    High cholesterol    Hypothyroid    History of colon resection    H/O: hysterectomy        ALLERGIES:  penicillin (Unknown)      MEDICATIONS:  MEDICATIONS  (STANDING):  atorvastatin 20 milliGRAM(s) Oral at bedtime  chlorhexidine 0.12% Liquid 15 milliLiter(s) Oral Mucosa two times a day  chlorhexidine 2% Cloths 1 Application(s) Topical <User Schedule>  dextrose 5%. 1000 milliLiter(s) (50 mL/Hr) IV Continuous <Continuous>  dextrose 5%. 1000 milliLiter(s) (100 mL/Hr) IV Continuous <Continuous>  dextrose 50% Injectable 25 Gram(s) IV Push once  dextrose 50% Injectable 12.5 Gram(s) IV Push once  dextrose 50% Injectable 25 Gram(s) IV Push once  enoxaparin Injectable 40 milliGRAM(s) SubCutaneous every 24 hours  fentaNYL   Infusion. 0.5 MICROgram(s)/kG/Hr (2.44 mL/Hr) IV Continuous <Continuous>  glucagon  Injectable 1 milliGRAM(s) IntraMuscular once  insulin lispro (ADMELOG) corrective regimen sliding scale   SubCutaneous four times a day with meals  levothyroxine 75 MICROGram(s) Oral daily  midodrine 20 milliGRAM(s) Oral every 8 hours  mupirocin 2% Ointment 1 Application(s) Topical two times a day  norepinephrine Infusion 0.05 MICROgram(s)/kG/Min (2.28 mL/Hr) IV Continuous <Continuous>  pantoprazole   Suspension 40 milliGRAM(s) Oral daily  polyethylene glycol 3350 17 Gram(s) Oral two times a day  propofol Infusion 10 MICROgram(s)/kG/Min (2.92 mL/Hr) IV Continuous <Continuous>  senna 2 Tablet(s) Oral at bedtime    MEDICATIONS  (PRN):  acetaminophen     Tablet .. 650 milliGRAM(s) Oral every 6 hours PRN Temp greater or equal to 38C (100.4F), Mild Pain (1 - 3)  dextrose Oral Gel 15 Gram(s) Oral once PRN Blood Glucose LESS THAN 70 milliGRAM(s)/deciliter      HOME MEDICATIONS:  Home Medications:  ascorbic acid 500 mg oral tablet: 1 tab(s) orally 2 times a day (07 Sep 2022 15:52)  docusate sodium 100 mg oral tablet: 1 tab(s) orally 2 times a day (07 Sep 2022 15:52)  lactulose 10 g oral powder for reconstitution: 30 milliliter(s) orally 2 times a day, As Needed for constipation (07 Sep 2022 15:52)  Lipitor 20 mg oral tablet: 1 tab(s) orally once a day (07 Sep 2022 15:52)  Melatonin 3 mg oral tablet: 1 tab(s) orally once a day (at bedtime) (07 Sep 2022 15:52)  Multiple Vitamins oral tablet: 1 tab(s) orally once a day (07 Sep 2022 15:52)  Synthroid 75 mcg (0.075 mg) oral tablet: 1 tab(s) orally once a day (07 Sep 2022 15:52)        OBJECTIVE:  ICU Vital Signs Last 24 Hrs  T(C): 36.1 (24 Sep 2022 00:00), Max: 36.3 (23 Sep 2022 12:00)  T(F): 97 (24 Sep 2022 00:00), Max: 97.3 (23 Sep 2022 12:00)  HR: 94 (24 Sep 2022 03:00) (40 - 118)  BP: 113/66 (24 Sep 2022 03:00) (79/44 - 198/89)  BP(mean): 86 (24 Sep 2022 03:00) (56 - 142)  ABP: --  ABP(mean): --  RR: 19 (24 Sep 2022 03:00) (14 - 54)  SpO2: 100% (24 Sep 2022 03:00) (98% - 100%)    O2 Parameters below as of 23 Sep 2022 18:00  Patient On (Oxygen Delivery Method): ventilator    O2 Concentration (%): 40      Mode: AC/ CMV (Assist Control/ Continuous Mandatory Ventilation)  RR (machine): 14  TV (machine): 350  FiO2: 40  PEEP: 5      22 Sep 2022 07:01  -  23 Sep 2022 07:00  --------------------------------------------------------  IN: 1712.8 mL / OUT: 2145 mL / NET: -432.2 mL    23 Sep 2022 07:01  -  23 Sep 2022 11:52  --------------------------------------------------------  IN: 109.5 mL / OUT: 65 mL / NET: 44.5 mL      Daily     Daily Weight in k.5 (23 Sep 2022 04:00)    PHYSICAL EXAM:  NEURO: patient is intubated but arousable and agitated when off of sedation  GEN: Not in acute distress  NECK: no thyroid enlargement, no JVD  LUNGS: Clear to auscultation bilaterally   CARDIOVASCULAR: S1/S2 present, RRR , no murmurs or rubs, no carotid bruits,  + PP bilaterally  ABD: Soft, non-tender, non-distended, +BS  EXT: No KRISTOPHER  SKIN: Intact  ACCESS Site:    LABS:                        9.5    6.54  )-----------( 299      ( 23 Sep 2022 05:05 )             30.0     09-23    145  |  104  |  44<H>  ----------------------------<  124<H>  4.3   |  34<H>  |  0.6<L>    Ca    9.1      23 Sep 2022 05:05  Phos  2.5     09-  Mg     2.2     09-    TPro  5.2<L>  /  Alb  2.9<L>  /  TBili  0.3  /  DBili  x   /  AST  18  /  ALT  23  /  AlkPhos  100          RADIOLOGY:    ECG:    TELEMETRY EVENTS:       HPI:  88yo F w/ pmhx of HTN, HLD, hypothyroidism, recent left femur fracture s/p surgery 1mo in [facility] presented to ED from [facility] with hypoxia and hypotension. Patient was saturating 50% on room air and improved only minimally on NRB in the ED. History was not able to be obtained by the patient as she was unresponsive and she was intubated for airway protection. Collateral HPI obtained by daughter Becky who said she was sent to the NH for rehab for her hip fracture. She denies any history of dementia. Baseline mental status is AAOx3 and prior to hip fracture patient was living by herself.     Of note patient was being treated in NH for R sided pneumonia (CXR : R perihilar infiltrate) and was receiving levaquin.         INTERVAL HISTORY:    Palliative and Pulm team met with family; patient is now DNR and will become CMO in the next day or two  Failed SBT today due to pulling low tidal volume and agitation    PAST MEDICAL & SURGICAL HISTORY  HTN (hypertension)    High cholesterol    Hypothyroid    History of colon resection    H/O: hysterectomy        ALLERGIES:  penicillin (Unknown)      MEDICATIONS:  MEDICATIONS  (STANDING):  atorvastatin 20 milliGRAM(s) Oral at bedtime  chlorhexidine 0.12% Liquid 15 milliLiter(s) Oral Mucosa two times a day  chlorhexidine 2% Cloths 1 Application(s) Topical <User Schedule>  dextrose 5%. 1000 milliLiter(s) (50 mL/Hr) IV Continuous <Continuous>  dextrose 5%. 1000 milliLiter(s) (100 mL/Hr) IV Continuous <Continuous>  dextrose 50% Injectable 25 Gram(s) IV Push once  dextrose 50% Injectable 12.5 Gram(s) IV Push once  dextrose 50% Injectable 25 Gram(s) IV Push once  enoxaparin Injectable 40 milliGRAM(s) SubCutaneous every 24 hours  fentaNYL   Infusion. 0.5 MICROgram(s)/kG/Hr (2.44 mL/Hr) IV Continuous <Continuous>  glucagon  Injectable 1 milliGRAM(s) IntraMuscular once  insulin lispro (ADMELOG) corrective regimen sliding scale   SubCutaneous four times a day with meals  levothyroxine 75 MICROGram(s) Oral daily  midodrine 20 milliGRAM(s) Oral every 8 hours  mupirocin 2% Ointment 1 Application(s) Topical two times a day  norepinephrine Infusion 0.05 MICROgram(s)/kG/Min (2.28 mL/Hr) IV Continuous <Continuous>  pantoprazole   Suspension 40 milliGRAM(s) Oral daily  polyethylene glycol 3350 17 Gram(s) Oral two times a day  propofol Infusion 10 MICROgram(s)/kG/Min (2.92 mL/Hr) IV Continuous <Continuous>  senna 2 Tablet(s) Oral at bedtime    MEDICATIONS  (PRN):  acetaminophen     Tablet .. 650 milliGRAM(s) Oral every 6 hours PRN Temp greater or equal to 38C (100.4F), Mild Pain (1 - 3)  dextrose Oral Gel 15 Gram(s) Oral once PRN Blood Glucose LESS THAN 70 milliGRAM(s)/deciliter      HOME MEDICATIONS:  Home Medications:  ascorbic acid 500 mg oral tablet: 1 tab(s) orally 2 times a day (07 Sep 2022 15:52)  docusate sodium 100 mg oral tablet: 1 tab(s) orally 2 times a day (07 Sep 2022 15:52)  lactulose 10 g oral powder for reconstitution: 30 milliliter(s) orally 2 times a day, As Needed for constipation (07 Sep 2022 15:52)  Lipitor 20 mg oral tablet: 1 tab(s) orally once a day (07 Sep 2022 15:52)  Melatonin 3 mg oral tablet: 1 tab(s) orally once a day (at bedtime) (07 Sep 2022 15:52)  Multiple Vitamins oral tablet: 1 tab(s) orally once a day (07 Sep 2022 15:52)  Synthroid 75 mcg (0.075 mg) oral tablet: 1 tab(s) orally once a day (07 Sep 2022 15:52)        OBJECTIVE:  ICU Vital Signs Last 24 Hrs  T(C): 36.6 (24 Sep 2022 04:00), Max: 36.6 (24 Sep 2022 04:00)  T(F): 97.8 (24 Sep 2022 04:00), Max: 97.8 (24 Sep 2022 04:00)  HR: 51 (24 Sep 2022 07:00) (46 - 118)  BP: 104/52 (24 Sep 2022 07:00) (79/44 - 198/89)  BP(mean): 75 (24 Sep 2022 07:00) (56 - 142)  ABP: --  ABP(mean): --  RR: 15 (24 Sep 2022 07:00) (14 - 54)  SpO2: 100% (24 Sep 2022 07:00) (98% - 100%)    O2 Parameters below as of 24 Sep 2022 07:00      O2 Concentration (%): 40          Mode: AC/ CMV (Assist Control/ Continuous Mandatory Ventilation)  RR (machine): 14  TV (machine): 350  FiO2: 40  PEEP: 5      22 Sep 2022 07:01  -  23 Sep 2022 07:00  --------------------------------------------------------  IN: 1712.8 mL / OUT: 2145 mL / NET: -432.2 mL    23 Sep 2022 07:01  -  23 Sep 2022 11:52  --------------------------------------------------------  IN: 109.5 mL / OUT: 65 mL / NET: 44.5 mL      Daily     Daily Weight in k.5 (23 Sep 2022 04:00)    PHYSICAL EXAM:  NEURO: patient is intubated but arousable and agitated when off of sedation  GEN: Not in acute distress  NECK: no thyroid enlargement, no JVD  LUNGS: Clear to auscultation bilaterally   CARDIOVASCULAR: S1/S2 present, RRR , no murmurs or rubs, no carotid bruits,  + PP bilaterally  ABD: Soft, non-tender, non-distended, +BS  EXT: No KRISTOPHER  SKIN: Intact  ACCESS Site:    LABS:               LABS:                        8.7    6.10  )-----------( 280      ( 24 Sep 2022 04:45 )             27.5     09-24    138  |  101  |  45<H>  ----------------------------<  145<H>  4.6   |  30  |  0.9    Ca    8.8      24 Sep 2022 04:45  Phos  3.4     09-24  Mg     2.0     09-24    TPro  4.9<L>  /  Alb  2.8<L>  /  TBili  0.3  /  DBili  x   /  AST  19  /  ALT  20  /  AlkPhos  91          RADIOLOGY:    ECG:    TELEMETRY EVENTS:

## 2022-09-24 NOTE — PROGRESS NOTE ADULT - ASSESSMENT
IMPRESSION:    Acute hypercapnic respiratory failure  Acute hypoxemic respiratory failure  THERESA with decreased urine output - resolved   H/o Hypothyroidism  Altered mental status   Acute Toxic Metabolic Encephalopathy  SIRS  LLL opacity/pneumonia    PLAN:    CNS: Following commands.   keep on sedation:   Fentanyl. Precedex dc'd fro bradycardia;   Wean off propofol.   SAT daily.   DC olanzapine.     HEENT: Oral care; ETT care.   CTS eval for possible tach if family agrees,     PULMONARY:  Keep same vent settings.   She has failed multiple SBTs and was actually extubated and reintubated last week.   At this point recommend tracheostomy placement if the family is agreeable to that.   CXR daily    CARDIOVASCULAR:  Keep equal balance. Back on low dose Levophed. midodrine 20mg [id number].     GI: Tolerating feeding. GI prophylaxis.  Bowel regimen. Lactic is normal. LFTs normal.     RENAL: Follow up lytes. Correct as needed. DC Patrick catheter. Kidney function at baseline. Keep equal balance.     INFECTIOUS DISEASE: Leukocytosis resolved. UA negative. Cultures negative.   COVID PCR positive.   Completed abx course.     HEMATOLOGICAL: DVT prophylaxis: Lovenox SQ.     ENDOCRINE: Follow up FS. Insulin protocol if needed.     MUSCULOSKELETAL: Bed in chair position.    DISPO: Critically ill, back on the ventilator, failed SBTs, hypotensive on pressors. She will need tracheostomy placement vs palliative liberation from the ventilator. Prognosis is dismal.

## 2022-09-24 NOTE — PROGRESS NOTE ADULT - ASSESSMENT
Assessment	  IMPRESSION:    Acute hypercapnic respiratory failure  Acute hypoxemic respiratory failure  THERESA with decreased urine output - resolved   H/o Hypothyroidism  Altered mental status   Acute Toxic Metabolic Encephalopathy  SIRS  LLL opacity/pneumonia    PLAN:    CNS: Following commands. Agitated and back on sedation:   - Precedex d/c'ed due to bradycardia  - Propofol and Fentanyl for sedation/agitation  - SAT daily  - pending family decision to make patient CMO likely in 24-48 hours  - Zyprexa discontinued due to prolonged QTc    HEENT: Oral care; ETT care. Family discussion re: trach.    PULMONARY:  Has failed multiple SBTs, extubated and reintubated last week.   - Patient remains on a ventilator  - Failed SBT due to low tidal volume and agitation  - Waiting for family to make final decision on comfort measures     CARDIOVASCULAR:  Keep equal balance  - midodrine 20mg [id number].   - Levophed; wean off pressors  - EKG : QTc 559    GI: Tolerating tube feeding. GI prophylaxis.     RENAL: Follow up lytes. Correct as needed. Patrick catheter. Kidney function back to baseline. Keep equal balance.     INFECTIOUS DISEASE: Leukocytosis resolved. UA negative. Cultures negative. COVID- resolved.    HEMATOLOGICAL: DVT prophylaxis: Lovenox SQ 40 q12.    ENDOCRINE: Follow up FS. Insulin protocol if needed.     MUSCULOSKELETAL: Bed in chair position.    DISPO: Critically ill, back on the ventilator, failed SBTs,  -Patient is now DNR; Family is going to make patient CMO in the next day or two.

## 2022-09-25 NOTE — PROGRESS NOTE ADULT - ASSESSMENT
Assessment	  IMPRESSION:    Acute hypercapnic respiratory failure  Acute hypoxemic respiratory failure  THERESA with decreased urine output - resolved   H/o Hypothyroidism  Altered mental status   Acute Toxic Metabolic Encephalopathy  SIRS  LLL opacity/pneumonia    PLAN:    CNS: Following commands. Agitated and back on sedation:   - Precedex d/c'ed due to bradycardia  - Propofol and Fentanyl for sedation/agitation  - SAT daily  - pending family decision to make patient CMO likely in 24-48 hours  - Zyprexa discontinued due to prolonged QTc    HEENT: Oral care; ETT care. Family discussion re: trach.    PULMONARY:  Has failed multiple SBTs, extubated and reintubated last week.   - Patient remains on a ventilator  - Failed SBT due to low tidal volume and agitation  - Waiting for family to make final decision on comfort measures     CARDIOVASCULAR:  Keep equal balance  - midodrine 20mg [id number].   - Levophed; wean off pressors      GI: Tolerating tube feeding. GI prophylaxis.     RENAL: Follow up lytes. Correct as needed. Patrick catheter. Kidney function back to baseline. Keep equal balance.     INFECTIOUS DISEASE: Leukocytosis resolved. UA negative. Cultures negative. COVID- resolved.    HEMATOLOGICAL: DVT prophylaxis: Lovenox SQ 40 q12.    ENDOCRINE: Follow up FS. Insulin protocol if needed.     MUSCULOSKELETAL: Bed in chair position.    DISPO: Critically ill, back on the ventilator, failed SBTs, hypotensive on pressors. She will need tracheostomy placement vs palliative liberation from the ventilator. Prognosis is dismal.

## 2022-09-25 NOTE — PROGRESS NOTE ADULT - SUBJECTIVE AND OBJECTIVE BOX
Patient is a 89y old  Female who presents with a chief complaint of AMS, respiratory failure (24 Sep 2022 07:45)        HPI:  89yo F w/ pmhx of HTN, HLD, hypothyroidism, recent left femur fracture s/p surgery 1mo in [facility] presented to ED from [facility] with hypoxia and hypotension. Patient was saturating 50% on room air and improved only minimally on NRB in the ED. History was not able to be obtained by the patient as she was unresponsive and she was intubated for airway protection. Collateral HPI obtained by daughter Becky who said she was sent to the NH for rehab for her hip fracture. She denies any history of dementia. Baseline mental status is AAOx3 and prior to hip fracture patient was living by herself.     Of note patient was being treated in NH for R sided pneumonia (CXR 9/6: R perihilar infiltrate) and was receiving levaquin.     ED course:  vitals: /84, HR 73, hypothermic T 93s, O2 100% on BVM  labs: WBC 14.96, trop 0.04, , ABG pH 7.09, PCO2 93, PO2 51  imaging:   - CXR: wet read: wnl   - CT angio chest PE: no PE, lungs wnl   - CT head: No acute intracranial pathology. No evidence of midline shift, mass effect or intracranial hemorrhage.  given: vanc, cefepime, LR 1800ml  (07 Sep 2022 15:40)      Pt evaluated on rounds.  I reviewed the radiology tests and hospital record.    I reviewed previous notes on this patient.    Interval Events: No overnight events.      CAM:    SAT:    SBT:      REVIEW OF SYSTEMS:   see HPI      OBJECTIVE:  ICU Vital Signs Last 24 Hrs  T(C): 36.8 (25 Sep 2022 01:00), Max: 37.3 (24 Sep 2022 12:00)  T(F): 98.3 (25 Sep 2022 01:00), Max: 99.1 (24 Sep 2022 12:00)  HR: 82 (25 Sep 2022 04:15) (42 - 125)  BP: 97/47 (25 Sep 2022 02:00) (80/45 - 230/102)  BP(mean): 68 (25 Sep 2022 02:00) (60 - 146)  ABP: --  ABP(mean): --  RR: 28 (25 Sep 2022 02:00) (14 - 38)  SpO2: 99% (25 Sep 2022 04:15) (93% - 100%)    O2 Parameters below as of 24 Sep 2022 08:00  Patient On (Oxygen Delivery Method): ventilator    O2 Concentration (%): 40      Mode: AC/ CMV (Assist Control/ Continuous Mandatory Ventilation), RR (machine): 14, TV (machine): 350, FiO2: 40, PEEP: 5, ITime: 1, MAP: 9, PIP: 22    09-24 @ 07:01  -  09-25 @ 07:00  --------------------------------------------------------  IN: 2367.5 mL / OUT: 850 mL / NET: 1517.5 mL      CAPILLARY BLOOD GLUCOSE      POCT Blood Glucose.: 143 mg/dL (25 Sep 2022 06:03)        PHYSICAL EXAM:       · ENMT:   Airway patent,   Nasal mucosa clear.  Mouth with normal mucosa.   No thrush    · EYES:   Clear bilaterally,   pupils equal,   round and reactive to light.    · CARDIAC:   Normal rate,   regular rhythm.    Heart sounds S1, S2.   No murmurs, no rubs or gallops on auscultation  no edema        CAROTID:   normal systolic impulse  no bruits    · RESPIRATORY:   rales  normal chest expansion  no retractions or use of accessory muscles  percussion of chest demonstrates no hyperresonance or dullness    · GASTROINTESTINAL:  Abdomen soft,   non-tender,   + BS  liver/spleen not palpable    · MUSCULOSKELETAL:   no clubbing, cyanosis      · SKIN:   Skin normal color for race,   warm, dry   No evidence of rash.        · HEME LYMPH:   no splenomegaly.  No cervical  lymphadenopathy.  no inguinal lymphadenopathy    HOSPITAL MEDICATIONS:  MEDICATIONS  (STANDING):  atorvastatin 20 milliGRAM(s) Oral at bedtime  chlorhexidine 0.12% Liquid 15 milliLiter(s) Oral Mucosa two times a day  chlorhexidine 2% Cloths 1 Application(s) Topical <User Schedule>  dextrose 5%. 1000 milliLiter(s) (100 mL/Hr) IV Continuous <Continuous>  dextrose 5%. 1000 milliLiter(s) (50 mL/Hr) IV Continuous <Continuous>  dextrose 50% Injectable 25 Gram(s) IV Push once  dextrose 50% Injectable 12.5 Gram(s) IV Push once  dextrose 50% Injectable 25 Gram(s) IV Push once  enoxaparin Injectable 40 milliGRAM(s) SubCutaneous every 24 hours  fentaNYL   Infusion. 0.5 MICROgram(s)/kG/Hr (2.44 mL/Hr) IV Continuous <Continuous>  glucagon  Injectable 1 milliGRAM(s) IntraMuscular once  insulin lispro (ADMELOG) corrective regimen sliding scale   SubCutaneous four times a day with meals  levothyroxine 75 MICROGram(s) Oral daily  midodrine 20 milliGRAM(s) Oral every 8 hours  mupirocin 2% Ointment 1 Application(s) Topical two times a day  norepinephrine Infusion 0.05 MICROgram(s)/kG/Min (2.28 mL/Hr) IV Continuous <Continuous>  pantoprazole   Suspension 40 milliGRAM(s) Oral daily  polyethylene glycol 3350 17 Gram(s) Oral two times a day  propofol Infusion 10 MICROgram(s)/kG/Min (2.92 mL/Hr) IV Continuous <Continuous>  senna 2 Tablet(s) Oral at bedtime    MEDICATIONS  (PRN):  acetaminophen     Tablet .. 650 milliGRAM(s) Oral every 6 hours PRN Temp greater or equal to 38C (100.4F), Mild Pain (1 - 3)  dextrose Oral Gel 15 Gram(s) Oral once PRN Blood Glucose LESS THAN 70 milliGRAM(s)/deciliter    lactated ringers Bolus:   250 milliLiter(s), IV Bolus, every 15 minutes, infuse over 10 Minute(s), Stop After 3 Doses  lactated ringers Bolus:   500 milliLiter(s), IV Bolus, once, infuse over 15 Minute(s), Stop After 1 Doses  sodium chloride 0.9% Bolus:   500 milliLiter(s), IV Bolus, once, infuse over 60 Minute(s), Stop After 1 Doses  lactated ringers Bolus:   500 milliLiter(s), IV Bolus, once, infuse over 30 Minute(s), Stop After 1 Doses  lactated ringers.: Solution, 1000 milliLiter(s) infuse at 75 mL/Hr  lactated ringers Bolus:   1800 milliLiter(s), IV Bolus, once, infuse over 60 Minute(s), Stop After 1 Doses  Provider's Contact #: [phone]      LABS:                        8.7    6.10  )-----------( 280      ( 24 Sep 2022 04:45 )             27.5     09-25    142  |  104  |  40<H>  ----------------------------<  146<H>  4.3   |  31  |  0.8    Ca    8.4      25 Sep 2022 06:50  Phos  3.3     09-25  Mg     2.1     09-25    TPro  5.3<L>  /  Alb  3.1<L>  /  TBili  0.4  /  DBili  x   /  AST  24  /  ALT  21  /  AlkPhos  96  09-25        Arterial Blood Gas:  09-25 @ 03:10  7.47/44/170/32/99.5/7.5  ABG lactate: --  Arterial Blood Gas:  09-24 @ 04:30  7.37/54/103/31/100.0/4.5  ABG lactate: --      Mode: AC/ CMV (Assist Control/ Continuous Mandatory Ventilation), RR (machine): 14, TV (machine): 350, FiO2: 40, PEEP: 5, ITime: 1, MAP: 9, PIP: 22      COVID-19 PCR: Detected (07 Sep 2022 16:55)    Mode: AC/ CMV (Assist Control/ Continuous Mandatory Ventilation)  RR (machine): 14  TV (machine): 350  FiO2: 40  PEEP: 5  ITime: 1  MAP: 9  PIP: 22      ABG - ( 25 Sep 2022 03:10 )  pH, Arterial: 7.47  pH, Blood: x     /  pCO2: 44    /  pO2: 170   / HCO3: 32    / Base Excess: 7.5   /  SaO2: 99.5                RADIOLOGY: Today I personally interpreted the latest CXR and other pertinent films.

## 2022-09-25 NOTE — PROGRESS NOTE ADULT - SUBJECTIVE AND OBJECTIVE BOX
HPI:  88yo F w/ pmhx of HTN, HLD, hypothyroidism, recent left femur fracture s/p surgery 1mo in [facility] presented to ED from [facility] with hypoxia and hypotension. Patient was saturating 50% on room air and improved only minimally on NRB in the ED. History was not able to be obtained by the patient as she was unresponsive and she was intubated for airway protection. Collateral HPI obtained by daughter Becky who said she was sent to the NH for rehab for her hip fracture. She denies any history of dementia. Baseline mental status is AAOx3 and prior to hip fracture patient was living by herself.     Of note patient was being treated in NH for R sided pneumonia (CXR 9/6: R perihilar infiltrate) and was receiving levaquin.     ED course:  vitals: /84, HR 73, hypothermic T 93s, O2 100% on BVM  labs: WBC 14.96, trop 0.04, , ABG pH 7.09, PCO2 93, PO2 51  imaging:   - CXR: wet read: wnl   - CT angio chest PE: no PE, lungs wnl   - CT head: No acute intracranial pathology. No evidence of midline shift, mass effect or intracranial hemorrhage.  given: vanc, cefepime, LR 1800ml  (07 Sep 2022 15:40)      INTERVAL HISTORY:    - No acute changes overnight    PAST MEDICAL & SURGICAL HISTORY  HTN (hypertension)    High cholesterol    Hypothyroid    History of colon resection    H/O: hysterectomy        ALLERGIES:  penicillin (Unknown)      MEDICATIONS:  MEDICATIONS  (STANDING):  atorvastatin 20 milliGRAM(s) Oral at bedtime  chlorhexidine 0.12% Liquid 15 milliLiter(s) Oral Mucosa two times a day  chlorhexidine 2% Cloths 1 Application(s) Topical <User Schedule>  dextrose 5%. 1000 milliLiter(s) (50 mL/Hr) IV Continuous <Continuous>  dextrose 5%. 1000 milliLiter(s) (100 mL/Hr) IV Continuous <Continuous>  dextrose 50% Injectable 25 Gram(s) IV Push once  dextrose 50% Injectable 12.5 Gram(s) IV Push once  dextrose 50% Injectable 25 Gram(s) IV Push once  enoxaparin Injectable 40 milliGRAM(s) SubCutaneous every 24 hours  fentaNYL   Infusion. 0.5 MICROgram(s)/kG/Hr (2.44 mL/Hr) IV Continuous <Continuous>  glucagon  Injectable 1 milliGRAM(s) IntraMuscular once  insulin lispro (ADMELOG) corrective regimen sliding scale   SubCutaneous four times a day with meals  levothyroxine 75 MICROGram(s) Oral daily  midodrine 20 milliGRAM(s) Oral every 8 hours  mupirocin 2% Ointment 1 Application(s) Topical two times a day  norepinephrine Infusion 0.05 MICROgram(s)/kG/Min (2.28 mL/Hr) IV Continuous <Continuous>  pantoprazole   Suspension 40 milliGRAM(s) Oral daily  polyethylene glycol 3350 17 Gram(s) Oral two times a day  propofol Infusion 10 MICROgram(s)/kG/Min (2.92 mL/Hr) IV Continuous <Continuous>  senna 2 Tablet(s) Oral at bedtime    MEDICATIONS  (PRN):  acetaminophen     Tablet .. 650 milliGRAM(s) Oral every 6 hours PRN Temp greater or equal to 38C (100.4F), Mild Pain (1 - 3)  dextrose Oral Gel 15 Gram(s) Oral once PRN Blood Glucose LESS THAN 70 milliGRAM(s)/deciliter      HOME MEDICATIONS:  Home Medications:  ascorbic acid 500 mg oral tablet: 1 tab(s) orally 2 times a day (07 Sep 2022 15:52)  docusate sodium 100 mg oral tablet: 1 tab(s) orally 2 times a day (07 Sep 2022 15:52)  lactulose 10 g oral powder for reconstitution: 30 milliliter(s) orally 2 times a day, As Needed for constipation (07 Sep 2022 15:52)  Lipitor 20 mg oral tablet: 1 tab(s) orally once a day (07 Sep 2022 15:52)  Melatonin 3 mg oral tablet: 1 tab(s) orally once a day (at bedtime) (07 Sep 2022 15:52)  Multiple Vitamins oral tablet: 1 tab(s) orally once a day (07 Sep 2022 15:52)  Synthroid 75 mcg (0.075 mg) oral tablet: 1 tab(s) orally once a day (07 Sep 2022 15:52)        OBJECTIVE:  ICU Vital Signs Last 24 Hrs  T(C): 36.8 (25 Sep 2022 12:00), Max: 36.8 (25 Sep 2022 01:00)  T(F): 98.2 (25 Sep 2022 12:00), Max: 98.3 (25 Sep 2022 01:00)  HR: 46 (25 Sep 2022 15:00) (42 - 120)  BP: 86/44 (25 Sep 2022 15:00) (80/45 - 190/77)  BP(mean): 61 (25 Sep 2022 15:00) (60 - 123)  ABP: --  ABP(mean): --  RR: 14 (25 Sep 2022 15:00) (14 - 38)  SpO2: 100% (25 Sep 2022 15:00) (93% - 100%)    O2 Parameters below as of 25 Sep 2022 08:00  Patient On (Oxygen Delivery Method): ventilator    O2 Concentration (%): 40      Mode: AC/ CMV (Assist Control/ Continuous Mandatory Ventilation)  RR (machine): 14  TV (machine): 350  FiO2: 40  PEEP: 5  ITime: 1  MAP: 9  PIP: 25      24 Sep 2022 07:01  -  25 Sep 2022 07:00  --------------------------------------------------------  IN: 2367.5 mL / OUT: 850 mL / NET: 1517.5 mL    25 Sep 2022 07:01  -  25 Sep 2022 16:05  --------------------------------------------------------  IN: 604.8 mL / OUT: 400 mL / NET: 204.8 mL      Daily     Daily     PHYSICAL EXAM:  NEURO: patient is intubated and unresponsive on sedation  GEN: Not in acute distress  NECK: no thyroid enlargement, no JVD  LUNGS: Clear to auscultation bilaterally   CARDIOVASCULAR: S1/S2 present, RRR , no murmurs or rubs, no carotid bruits,  + PP bilaterally  ABD: Soft, non-tender, non-distended, +BS  EXT: No KRISTOPHER  SKIN: Intact      LABS:                        9.1    5.77  )-----------( 290      ( 25 Sep 2022 06:50 )             28.2     09-25    142  |  104  |  40<H>  ----------------------------<  146<H>  4.3   |  31  |  0.8    Ca    8.4      25 Sep 2022 06:50  Phos  3.3     09-25  Mg     2.1     09-25    TPro  5.3<L>  /  Alb  3.1<L>  /  TBili  0.4  /  DBili  x   /  AST  24  /  ALT  21  /  AlkPhos  96  09-25      RADIOLOGY:  -CXR:  -TTE:  -STRESS TEST:  -CATHETERIZATION:    ECG:    TELEMETRY EVENTS:

## 2022-09-25 NOTE — PROGRESS NOTE ADULT - ASSESSMENT
IMPRESSION:    Acute hypercapnic respiratory failure  Acute hypoxemic respiratory failure  THERESA with decreased urine output - resolved   H/o Hypothyroidism  Altered mental status   Acute Toxic Metabolic Encephalopathy  SIRS  LLL opacity/pneumonia    PLAN:    CNS: Following commands.   SAT daily.   DC olanzapine.     HEENT: Oral care; ETT care.   CTS eval for possible tach if family agrees,     PULMONARY:  Keep same vent settings.   She has failed multiple SBTs and was actually extubated and reintubated last week.   At this point recommend tracheostomy placement if the family is agreeable to that.   CXR daily    CARDIOVASCULAR:  Keep equal balance. Back on low dose Levophed. midodrine 20mg [id number].     GI: Tolerating feeding. GI prophylaxis.  Bowel regimen. Lactic is normal. LFTs normal.     RENAL: Follow up lytes. Correct as needed. DC Patrick catheter. Kidney function at baseline. Keep equal balance.     INFECTIOUS DISEASE: Leukocytosis resolved. UA negative. Cultures negative.   COVID PCR positive.   Completed abx course.     HEMATOLOGICAL: DVT prophylaxis: Lovenox SQ.     ENDOCRINE: Follow up FS. Insulin protocol if needed.     MUSCULOSKELETAL: Bed in chair position.    DISPO: Critically ill, back on the ventilator, failed SBTs, hypotensive on pressors. She will need tracheostomy placement vs palliative liberation from the ventilator. Prognosis is dismal.

## 2022-09-26 NOTE — PROGRESS NOTE ADULT - CRITICAL CARE SERVICES PROVIDED
Patient is critically ill, requiring critical care services.

## 2022-09-26 NOTE — PROGRESS NOTE ADULT - PROBLEM SELECTOR PLAN 4
Severe - different medications tried to ease issue while on ventilator  Now on propofol IV and fentanyl IV. Severe - different medications tried to ease issue while on ventilator  Now on propofol IV and fentanyl IV.  propofol D/C earlier this morning  Fentanyl also held pt very anxious   Now CMO, restart Fentanyl for extubation and add Ativan 2mg IVP Q 1 hr PRN

## 2022-09-26 NOTE — PROGRESS NOTE ADULT - PROBLEM SELECTOR PLAN 2
Vented, failing SBTs. Extubated and quickly desaturated and was reintubated  Onging CCU care, c/w IV fentanyl, IV pressors, high risk.
Vented, failing SBTs. Extubated and quickly desaturated and was reintubated  Onging CCU care, c/w IV fentanyl, IV pressors, high risk.

## 2022-09-26 NOTE — PROGRESS NOTE ADULT - PROBLEM SELECTOR PLAN 5
Family Meeting held with family - see GOC note. Family Meeting held with family - see GOC note.  Family's goal is comfort

## 2022-09-26 NOTE — PROGRESS NOTE ADULT - SUBJECTIVE AND OBJECTIVE BOX
HPI:  88yo F w/ pmhx of HTN, HLD, hypothyroidism, recent left femur fracture s/p surgery 1mo in [facility] presented to ED from [facility] with hypoxia and hypotension. Patient was saturating 50% on room air and improved only minimally on NRB in the ED. History was not able to be obtained by the patient as she was unresponsive and she was intubated for airway protection. Collateral HPI obtained by daughter Becky who said she was sent to the NH for rehab for her hip fracture. She denies any history of dementia. Baseline mental status is AAOx3 and prior to hip fracture patient was living by herself.     Of note patient was being treated in NH for R sided pneumonia (CXR 9/6: R perihilar infiltrate) and was receiving levaquin.         INTERVAL HISTORY:    - Family met with palliative and Dr. Mar this morning and decided to proceed with a palliative extubation and comfort measures only    PAST MEDICAL & SURGICAL HISTORY  HTN (hypertension)    High cholesterol    Hypothyroid    History of colon resection    H/O: hysterectomy        ALLERGIES:  penicillin (Unknown)      MEDICATIONS:  MEDICATIONS  (STANDING):  atorvastatin 20 milliGRAM(s) Oral at bedtime  chlorhexidine 0.12% Liquid 15 milliLiter(s) Oral Mucosa two times a day  chlorhexidine 2% Cloths 1 Application(s) Topical <User Schedule>  dextrose 5%. 1000 milliLiter(s) (50 mL/Hr) IV Continuous <Continuous>  dextrose 5%. 1000 milliLiter(s) (100 mL/Hr) IV Continuous <Continuous>  dextrose 50% Injectable 25 Gram(s) IV Push once  dextrose 50% Injectable 12.5 Gram(s) IV Push once  dextrose 50% Injectable 25 Gram(s) IV Push once  enoxaparin Injectable 40 milliGRAM(s) SubCutaneous every 24 hours  fentaNYL   Infusion. 0.5 MICROgram(s)/kG/Hr (2.44 mL/Hr) IV Continuous <Continuous>  glucagon  Injectable 1 milliGRAM(s) IntraMuscular once  glycopyrrolate Injectable 0.2 milliGRAM(s) IV Push every 6 hours  insulin lispro (ADMELOG) corrective regimen sliding scale   SubCutaneous four times a day with meals  levothyroxine 75 MICROGram(s) Oral daily  midodrine 20 milliGRAM(s) Oral every 8 hours  mupirocin 2% Ointment 1 Application(s) Topical two times a day  norepinephrine Infusion 0.05 MICROgram(s)/kG/Min (2.28 mL/Hr) IV Continuous <Continuous>  pantoprazole   Suspension 40 milliGRAM(s) Oral daily  polyethylene glycol 3350 17 Gram(s) Oral two times a day  propofol Infusion 10 MICROgram(s)/kG/Min (2.92 mL/Hr) IV Continuous <Continuous>  senna 2 Tablet(s) Oral at bedtime    MEDICATIONS  (PRN):  acetaminophen     Tablet .. 650 milliGRAM(s) Oral every 6 hours PRN Temp greater or equal to 38C (100.4F), Mild Pain (1 - 3)  dextrose Oral Gel 15 Gram(s) Oral once PRN Blood Glucose LESS THAN 70 milliGRAM(s)/deciliter  HYDROmorphone  Injectable 0.5 milliGRAM(s) IV Push every 15 minutes PRN dyspnea or pain  LORazepam   Injectable 2 milliGRAM(s) IV Push every 1 hour PRN anxiety or restlessness      HOME MEDICATIONS:  Home Medications:  ascorbic acid 500 mg oral tablet: 1 tab(s) orally 2 times a day (07 Sep 2022 15:52)  docusate sodium 100 mg oral tablet: 1 tab(s) orally 2 times a day (07 Sep 2022 15:52)  lactulose 10 g oral powder for reconstitution: 30 milliliter(s) orally 2 times a day, As Needed for constipation (07 Sep 2022 15:52)  Lipitor 20 mg oral tablet: 1 tab(s) orally once a day (07 Sep 2022 15:52)  Melatonin 3 mg oral tablet: 1 tab(s) orally once a day (at bedtime) (07 Sep 2022 15:52)  Multiple Vitamins oral tablet: 1 tab(s) orally once a day (07 Sep 2022 15:52)  Synthroid 75 mcg (0.075 mg) oral tablet: 1 tab(s) orally once a day (07 Sep 2022 15:52)        OBJECTIVE:  ICU Vital Signs Last 24 Hrs  T(C): 36.4 (26 Sep 2022 12:00), Max: 36.4 (26 Sep 2022 00:00)  T(F): 97.6 (26 Sep 2022 12:00), Max: 97.6 (26 Sep 2022 12:00)  HR: 94 (26 Sep 2022 13:00) (44 - 124)  BP: 88/51 (26 Sep 2022 13:00) (76/37 - 204/84)  BP(mean): 67 (26 Sep 2022 13:00) (51 - 128)  ABP: --  ABP(mean): --  RR: 7 (26 Sep 2022 13:00) (7 - 37)  SpO2: 72% (26 Sep 2022 13:00) (72% - 100%)    O2 Parameters below as of 26 Sep 2022 14:00  Patient On (Oxygen Delivery Method): room air          Mode: AC/ CMV (Assist Control/ Continuous Mandatory Ventilation)  RR (machine): 14  TV (machine): 350  FiO2: 40  PEEP: 5  ITime: 1  MAP: 8  PIP: 21      25 Sep 2022 07:01  -  26 Sep 2022 07:00  --------------------------------------------------------  IN: 2235.7 mL / OUT: 1000 mL / NET: 1235.7 mL    26 Sep 2022 07:01  -  26 Sep 2022 13:31  --------------------------------------------------------  IN: 23.7 mL / OUT: 400 mL / NET: -376.3 mL      PHYSICAL EXAM:  NEURO: patient is extubated, awake and appears lethargic  GEN: Not in moderate distress  NECK: no thyroid enlargement, no JVD  LUNGS: decreased breath sounds and chest rise bilaterally  CARDIOVASCULAR: S1/S2 present, RRR , no murmurs or rubs, no carotid bruits,  + PP bilaterally  ABD: Soft, non-tender, non-distended, +BS  EXT: No KRISTOPHER  SKIN: Intact  ACCESS Site:    LABS:                        9.2    5.53  )-----------( 276      ( 26 Sep 2022 04:04 )             28.5     09-26    147<H>  |  108  |  34<H>  ----------------------------<  129<H>  4.2   |  33<H>  |  0.7    Ca    8.7      26 Sep 2022 04:04  Phos  3.3     09-26  Mg     2.0     09-26    TPro  5.4<L>  /  Alb  3.1<L>  /  TBili  0.3  /  DBili  x   /  AST  25  /  ALT  23  /  AlkPhos  97  09-26        RADIOLOGY:    ECG:    TELEMETRY EVENTS:

## 2022-09-26 NOTE — PROGRESS NOTE ADULT - PROBLEM SELECTOR PLAN 3
S/P hip fx with repair at [facility], was receiving rehab when she fell ill with pneumonia and respiratory failure.  Supportive care.
S/P hip fx with repair at [facility], was receiving rehab when she fell ill with pneumonia and respiratory failure.  Supportive care.

## 2022-09-26 NOTE — PROGRESS NOTE ADULT - SUBJECTIVE AND OBJECTIVE BOX
Patient is a 89y old  Female who presents with a chief complaint of AMS, respiratory failure (25 Sep 2022 16:00)      Over Night Events:  Patient seen and examined.   still vent on pressors   on sedation     ROS:  See HPI    PHYSICAL EXAM    ICU Vital Signs Last 24 Hrs  T(C): 36.3 (26 Sep 2022 03:45), Max: 36.8 (25 Sep 2022 12:00)  T(F): 97.4 (26 Sep 2022 03:45), Max: 98.2 (25 Sep 2022 12:00)  HR: 52 (26 Sep 2022 06:30) (43 - 120)  BP: 120/58 (26 Sep 2022 06:30) (76/37 - 204/84)  BP(mean): 83 (26 Sep 2022 06:30) (51 - 128)  ABP: --  ABP(mean): --  RR: 18 (26 Sep 2022 06:30) (14 - 37)  SpO2: 100% (26 Sep 2022 06:30) (98% - 100%)    O2 Parameters below as of 25 Sep 2022 08:00  Patient On (Oxygen Delivery Method): ventilator    O2 Concentration (%): 40        General: Awake   HEENT: Et tube                Lymph Nodes: NO cervical LN   Lungs: Bilateral BS  Cardiovascular: Regular   Abdomen: Soft, Positive BS  Extremities: No clubbing   Skin: warm   Neurological: no focal   Musculoskeletal: move all ext     I&O's Detail    25 Sep 2022 07:01  -  26 Sep 2022 07:00  --------------------------------------------------------  IN:    Enteral Tube Flush: 150 mL    FentaNYL: 393 mL    Norepinephrine: 104 mL    Peptamen A.F.: 1440 mL    Propofol: 122.3 mL  Total IN: 2209.3 mL    OUT:    Incontinent per Collection Bag (mL): 1000 mL  Total OUT: 1000 mL    Total NET: 1209.3 mL          LABS:                          9.2    5.53  )-----------( 276      ( 26 Sep 2022 04:04 )             28.5         26 Sep 2022 04:04    147    |  108    |  34     ----------------------------<  129    4.2     |  33     |  0.7      Ca    8.7        26 Sep 2022 04:04  Phos  3.3       26 Sep 2022 04:04  Mg     2.0       26 Sep 2022 04:04    TPro  5.4    /  Alb  3.1    /  TBili  0.3    /  DBili  x      /  AST  25     /  ALT  23     /  AlkPhos  97     26 Sep 2022 04:04  Amylase x     lipase x                                                                                                                                                                                                 Mode: AC/ CMV (Assist Control/ Continuous Mandatory Ventilation)  RR (machine): 14  TV (machine): 350  FiO2: 40  PEEP: 5  ITime: 1  MAP: 9  PIP: 22                                      ABG - ( 26 Sep 2022 02:59 )  pH, Arterial: 7.44  pH, Blood: x     /  pCO2: 47    /  pO2: 166   / HCO3: 32    / Base Excess: 6.7   /  SaO2: 100.0               MEDICATIONS  (STANDING):  atorvastatin 20 milliGRAM(s) Oral at bedtime  chlorhexidine 0.12% Liquid 15 milliLiter(s) Oral Mucosa two times a day  chlorhexidine 2% Cloths 1 Application(s) Topical <User Schedule>  dextrose 5%. 1000 milliLiter(s) (100 mL/Hr) IV Continuous <Continuous>  dextrose 5%. 1000 milliLiter(s) (50 mL/Hr) IV Continuous <Continuous>  dextrose 50% Injectable 25 Gram(s) IV Push once  dextrose 50% Injectable 12.5 Gram(s) IV Push once  dextrose 50% Injectable 25 Gram(s) IV Push once  enoxaparin Injectable 40 milliGRAM(s) SubCutaneous every 24 hours  fentaNYL   Infusion. 0.5 MICROgram(s)/kG/Hr (2.44 mL/Hr) IV Continuous <Continuous>  glucagon  Injectable 1 milliGRAM(s) IntraMuscular once  insulin lispro (ADMELOG) corrective regimen sliding scale   SubCutaneous four times a day with meals  levothyroxine 75 MICROGram(s) Oral daily  midodrine 20 milliGRAM(s) Oral every 8 hours  mupirocin 2% Ointment 1 Application(s) Topical two times a day  norepinephrine Infusion 0.05 MICROgram(s)/kG/Min (2.28 mL/Hr) IV Continuous <Continuous>  pantoprazole   Suspension 40 milliGRAM(s) Oral daily  polyethylene glycol 3350 17 Gram(s) Oral two times a day  propofol Infusion 10 MICROgram(s)/kG/Min (2.92 mL/Hr) IV Continuous <Continuous>  senna 2 Tablet(s) Oral at bedtime    MEDICATIONS  (PRN):  acetaminophen     Tablet .. 650 milliGRAM(s) Oral every 6 hours PRN Temp greater or equal to 38C (100.4F), Mild Pain (1 - 3)  dextrose Oral Gel 15 Gram(s) Oral once PRN Blood Glucose LESS THAN 70 milliGRAM(s)/deciliter          Xrays:  TLC:  OG:  ET tube:                                                                                    no foal deficit    ECHO:  [facility] ICU:

## 2022-09-26 NOTE — PROGRESS NOTE ADULT - ASSESSMENT
89yFemale being evaluated for goals of care  and symptom management. Vented sedated and restrained. Daughters at bedside for meeting. Failing all SBTs. Today was apneic during breathing trial that lasted 2 minutes       MEDD (morphine equivalent daily dose): fentanyl gtt      See Recs below.    Please call [phone] with questions or concerns 24/7.   We will continue to follow.

## 2022-09-26 NOTE — PROGRESS NOTE ADULT - PROBLEM SELECTOR PLAN 1
DNR now  Planning for palliative vent withdrawal today DNR now  Planning for palliative vent withdrawal today  CMO now  Meds as below  - Fentanyl gtt continued  - Ativan 2mg IVP q 1/2 hr PRN for anxiety and restlessness   - Dilaudid 0.5mg IV q 15min PRN for dyspnea or pain   - Glycopyrrolate 0.2mg IV q 6 hrs RTC DNR now  Planning for palliative vent withdrawal today  CMO now  Meds as below  - Fentanyl gtt continued - may titrate up/down as directed   - Ativan 2mg IVP q 1/2 hr PRN for anxiety and restlessness   - Dilaudid 0.5mg IV q 15min PRN for dyspnea or pain   - Glycopyrrolate 0.2mg IV q 6 hrs RTC DNR now  Planning for palliative vent withdrawal today  CMO now  Meds as below  - Fentanyl gtt continued - may titrate up/down as directed (if needed to transition to dilaudid, can consider 0.5mg/h gtt)  - Ativan 2mg IVP q 1/2 hr PRN for anxiety and restlessness   - Dilaudid 0.5mg IV q 15min PRN for dyspnea or pain   - Glycopyrrolate 0.2mg IV q 6 hrs RTC

## 2022-09-26 NOTE — PROGRESS NOTE ADULT - CONVERSATION DETAILS
Palliative care team and CCU team met with patients daughters/Surrogate    Reviewed pt's current medical condition and treatment. All medical questions answered. Family vented about their grief that mom was so functional prior to her hip fx in August in 2022. They also explained that mom had said she never wanted to live on a ventilator.    They were able to come to terms the fact that mom won't be able tot recover and come off the ventilator. So if she got a tracheostomy she would have to go to a ventilator facility. They both said she would not have wanted to live that way.     Palliative care team presented palliative ventilator withdrawal and comfort measures only. They want her to be comfortable. Said they have family who need to see her, especially her granddaughter.     DNR explained as well as no escalation of care. Daughters want to focus on comfort. No more central lines (on is in and should be kept), no more SBT where sedation is lowered because pt has such a bad reaction to lowering sedation.     DNR put in place, plan for palliative extubation and CMO when family is ready likely w/n 24-28 hrs. Team contact given to family
Palliative care team followed up with daughters at bedside    Reviewed palliative extubation and plan for comfort measures only. Pt more alert failing breathing trials. They confirmed comfort care goals. All questions answered.

## 2022-09-26 NOTE — PROGRESS NOTE ADULT - ASSESSMENT
IMPRESSION:    Acute hypercapnic respiratory failure  THERESA with decreased urine output - resolved   H/o Hypothyroidism  Altered mental status   Acute Toxic Metabolic Encephalopathy  SIRS  LLL opacity/pneumonia    PLAN:    CNS: Following commands.   SAT daily.   DC olanzapine.     HEENT: Oral care; ETT care.   CTS eval for possible tach if family agrees,     PULMONARY:  Keep same vent settings. been failing SBT  She has failed multiple SBTs and was actually extubated and reintubated last week.   At this point recommend tracheostomy placement if the family is agreeable to that.   CXR daily  pending family decision     CARDIOVASCULAR:  Keep equal balance. Back on low dose Levophed. midodrine 20mg [id number].     GI: Tolerating feeding. GI prophylaxis.  Bowel regimen. Lactic is normal. LFTs normal.     RENAL: Follow up lytes. Correct as needed. DC Patrick catheter. Kidney function at baseline. Keep equal balance.     INFECTIOUS DISEASE: Leukocytosis resolved. UA negative. Cultures negative.   COVID PCR positive.   Completed abx course.     HEMATOLOGICAL: DVT prophylaxis: Lovenox SQ.     ENDOCRINE: Follow up FS. Insulin protocol if needed.     MUSCULOSKELETAL: Bed in chair position.    DISPO: Critically ill, back on the ventilator, failed SBTs, hypotensive on pressors. She will need tracheostomy placement vs palliative liberation from the ventilator. Prognosis is dismal.

## 2022-09-26 NOTE — PROGRESS NOTE ADULT - NS ATTEND AMEND GEN_ALL_CORE FT
Agree with above, based on d/w daughters, would not want this quality of life, and agreed to DNR, timing re: compassionate extubation pending  ______________  Brannon Keen MD  Palliative Medicine  [facility]  [phone]
Agree with above, goals for CMO and palliative extubation, d/w family and team today, medication management as above, reviewed  ______________  Brannon Keen MD  Palliative Medicine  [facility]  [phone]

## 2022-09-26 NOTE — PROGRESS NOTE ADULT - SUBJECTIVE AND OBJECTIVE BOX
HPI:  89yo F w/ pmhx of HTN, HLD, hypothyroidism, recent left femur fracture s/p surgery 1mo in [facility] presented to ED from [facility] with hypoxia and hypotension. Patient was saturating 50% on room air and improved only minimally on NRB in the ED. History was not able to be obtained by the patient as she was unresponsive and she was intubated for airway protection. Collateral HPI obtained by daughter Becky who said she was sent to the NH for rehab for her hip fracture. She denies any history of dementia. Baseline mental status is AAOx3 and prior to hip fracture patient was living by herself.     Of note patient was being treated in NH for R sided pneumonia (CXR 9/6: R perihilar infiltrate) and was receiving levaquin.     ED course:  vitals: /84, HR 73, hypothermic T 93s, O2 100% on BVM  labs: WBC 14.96, trop 0.04, , ABG pH 7.09, PCO2 93, PO2 51  imaging:   - CXR: wet read: wnl   - CT angio chest PE: no PE, lungs wnl   - CT head: No acute intracranial pathology. No evidence of midline shift, mass effect or intracranial hemorrhage.  given: vanc, cefepime, LR 1800ml  (07 Sep 2022 15:40)     INTERVAL EVENTS:  9/23: patient not in distress  9/24: plan for palliative extubation     ADVANCE DIRECTIVES:    DNR  MOLST  [ ]  Living Will  [ ]   DECISION MAKER(s):  [ ] Health Care Proxy(s)  [x ] Surrogate(s)  [ ] Guardian           Name(s): Phone Number(s):    BASELINE (I)ADL(s) (prior to admission):  [place]: [ ]Total  [ ] Moderate [ ]Dependent  Palliative Performance Status Version 2:         %    http://npcrc.org/files/news/palliative_performance_scale_ppsv2.pdf    Allergies    penicillin (Unknown)    Intolerances    MEDICATIONS  (STANDING):  atorvastatin 20 milliGRAM(s) Oral at bedtime  chlorhexidine 0.12% Liquid 15 milliLiter(s) Oral Mucosa two times a day  chlorhexidine 2% Cloths 1 Application(s) Topical <User Schedule>  dextrose 5%. 1000 milliLiter(s) (50 mL/Hr) IV Continuous <Continuous>  dextrose 5%. 1000 milliLiter(s) (100 mL/Hr) IV Continuous <Continuous>  dextrose 50% Injectable 25 Gram(s) IV Push once  dextrose 50% Injectable 12.5 Gram(s) IV Push once  dextrose 50% Injectable 25 Gram(s) IV Push once  enoxaparin Injectable 40 milliGRAM(s) SubCutaneous every 24 hours  fentaNYL   Infusion. 0.5 MICROgram(s)/kG/Hr (2.44 mL/Hr) IV Continuous <Continuous>  glucagon  Injectable 1 milliGRAM(s) IntraMuscular once  insulin lispro (ADMELOG) corrective regimen sliding scale   SubCutaneous four times a day with meals  levothyroxine 75 MICROGram(s) Oral daily  midodrine 20 milliGRAM(s) Oral every 8 hours  mupirocin 2% Ointment 1 Application(s) Topical two times a day  norepinephrine Infusion 0.05 MICROgram(s)/kG/Min (2.28 mL/Hr) IV Continuous <Continuous>  pantoprazole   Suspension 40 milliGRAM(s) Oral daily  polyethylene glycol 3350 17 Gram(s) Oral two times a day  propofol Infusion 10 MICROgram(s)/kG/Min (2.92 mL/Hr) IV Continuous <Continuous>  senna 2 Tablet(s) Oral at bedtime    MEDICATIONS  (PRN):  acetaminophen     Tablet .. 650 milliGRAM(s) Oral every 6 hours PRN Temp greater or equal to 38C (100.4F), Mild Pain (1 - 3)  dextrose Oral Gel 15 Gram(s) Oral once PRN Blood Glucose LESS THAN 70 milliGRAM(s)/deciliter    PRESENT SYMPTOMS: [ ]Unable to obtain due to poor mentation   Source if other than patient:  [ ]Family   [ ]Team     Pain: [ ]yes [ ]no  QOL impact -   Location -                    Aggravating factors -  Quality -  Radiation -  Timing-  Severity (0-10 scale):  Minimal acceptable level (0-10 scale):     CPOT:    https://www.sccm.org/getattachment/dbs34v37-0u4p-1l8c-3i3m-2549d1762k4x/Critical-Care-Pain-Observation-Tool-(CPOT)      PAIN AD Score:     [email or web address] (press ctrl +  left click to view)    Dyspnea:                           [ ]Mild [ ]Moderate [ ]Severe  Anxiety:                             [ ]Mild [ ]Moderate [ ]Severe  Fatigue:                             [ ]Mild [ ]Moderate [ ]Severe  Nausea:                             [ ]Mild [ ]Moderate [ ]Severe  Loss of appetite:              [ ]Mild [ ]Moderate [ ]Severe  Constipation:                    [ ]Mild [ ]Moderate [ ]Severe    Other Symptoms:  [ ]All other review of systems negative     Palliative Performance Status Version 2:         %    http://[facility].org/files/news/palliative_performance_scale_ppsv2.pdf  PHYSICAL EXAM:  Vital Signs Last 24 Hrs  T(C): 36.3 (26 Sep 2022 03:45), Max: 36.8 (25 Sep 2022 12:00)  T(F): 97.4 (26 Sep 2022 03:45), Max: 98.2 (25 Sep 2022 12:00)  HR: 110 (26 Sep 2022 09:00) (44 - 110)  BP: 104/80 (26 Sep 2022 09:00) (76/37 - 204/84)  BP(mean): 89 (26 Sep 2022 09:00) (51 - 128)  RR: 28 (26 Sep 2022 09:00) (14 - 37)  SpO2: 98% (26 Sep 2022 09:00) (98% - 100%)    Parameters below as of 26 Sep 2022 09:00  Patient On (Oxygen Delivery Method): ventilator    O2 Concentration (%): 40 I&O's Summary    25 Sep 2022 07:01  -  26 Sep 2022 07:00  --------------------------------------------------------  IN: 2235.7 mL / OUT: 1000 mL / NET: 1235.7 mL    26 Sep 2022 07:01  -  26 Sep 2022 10:19  --------------------------------------------------------  IN: 13.5 mL / OUT: 0 mL / NET: 13.5 mL    GENERAL:  [ ]Alert  [ ]Oriented x   [ ]Lethargic  [ ]Cachexia  [ ]Unarousable  [ ]Verbal  [x ]Non-Verbal  Behavioral:   [ ] Anxiety  [ ] Delirium [ ] Agitation [x ] Other calm  HEENT:  [ ]Normal   [ ]Dry mouth   [x ]ET Tube/Trach  [ ]Oral lesions  PULMONARY:   [ ]Clear [x ]Tachypnea  [ ]Audible excessive secretions   [ ]Rhonchi        [ ]Right [ ]Left [ ]Bilateral  [ ]Crackles        [ ]Right [ ]Left [ ]Bilateral  [ ]Wheezing     [ ]Right [ ]Left [ ]Bilateral  [ ]Diminished breath sounds [ ]right [ ]left [ ]bilateral  CARDIOVASCULAR:    [x ]Regular [ ]Irregular [ ]Tachy  [ ]Favian [ ]Murmur [ ]Other  GASTROINTESTINAL:  [ ]Soft  [ ]Distended   [ ]+BS  [ ]Non tender [ ]Tender  [ ]PEG xOGT/ NGT  Last BM:   GENITOURINARY:  [ ]Normal [ ] Incontinent   [ ]Oliguria/Anuria   [ x]Patrick  MUSCULOSKELETAL:   [x ]Normal   [ ]Weakness  [ ]Bed/Wheelchair bound [ ]Edema  NEUROLOGIC: unlatoriable to assess - sedated on ventilator  [ ]No focal deficits  [ ]Cognitive impairment  [ ]Dysphagia [ ]Dysarthria [ ]Paresis [ ]Other   SKIN:   [ ]Normal    [ ]Rash  [ ]Pressure ulcer(s)       Present on admission [ ]y [ ]n    CRITICAL CARE:  [ ] Shock Present  [ x]Septic [ ]Cardiogenic [ ]Neurologic [ ]Hypovolemic  [ ]  Vasopressors [ ]  Inotropes   [x ]Respiratory failure present [ x]Mechanical ventilation [ ]Non-invasive ventilatory support [ ]High flow  [ ]Acute  [ ]Chronic [ ]Hypoxic  [ ]Hypercarbic [ ]Other  [ ]Other organ failure       LABS:                        9.2    5.53  )-----------( 276      ( 26 Sep 2022 04:04 )             28.5   09-26    147<H>  |  108  |  34<H>  ----------------------------<  129<H>  4.2   |  33<H>  |  0.7    Ca    8.7      26 Sep 2022 04:04  Phos  3.3     09-26  Mg     2.0     09-26    TPro  5.4<L>  /  Alb  3.1<L>  /  TBili  0.3  /  DBili  x   /  AST  25  /  ALT  23  /  AlkPhos  97  09-26        RADIOLOGY & ADDITIONAL STUDIES:    PROTEIN CALORIE MALNUTRITION PRESENT: [ ]mild [ ]moderate [ ]severe [ ]underweight [ ]morbid obesity  https://www.andeal.org/vault/2440/web/files/ONC/Table_Clinical%20Characteristics%20to%20Document%20Malnutrition-White%20JV%20et%20al%202012.pdf    Height (cm): 152.4 (09-08-22 @ 05:00)  Weight (kg): 48.7 (09-08-22 @ 05:00)  BMI (kg/m2): 21 (09-08-22 @ 05:00)    [ ]PPSV2 < or = to 30% [ ]significant weight loss  [ ]poor nutritional intake  [ ]anasarca      [ ]Artificial Nutrition      REFERRALS:   [ ]Chaplaincy  [ ]Hospice  [ ]Child Life  [ ]Social Work  [ ]Case management [ ]Holistic Therapy     Goals of Care Document:          HPI:  88yo F w/ pmhx of HTN, HLD, hypothyroidism, recent left femur fracture s/p surgery 1mo in [facility] presented to ED from [facility] with hypoxia and hypotension. Patient was saturating 50% on room air and improved only minimally on NRB in the ED. History was not able to be obtained by the patient as she was unresponsive and she was intubated for airway protection. Collateral HPI obtained by daughter Becky who said she was sent to the NH for rehab for her hip fracture. She denies any history of dementia. Baseline mental status is AAOx3 and prior to hip fracture patient was living by herself.     Of note patient was being treated in NH for R sided pneumonia (CXR 9/6: R perihilar infiltrate) and was receiving levaquin.     ED course:  vitals: /84, HR 73, hypothermic T 93s, O2 100% on BVM  labs: WBC 14.96, trop 0.04, , ABG pH 7.09, PCO2 93, PO2 51  imaging:   - CXR: wet read: wnl   - CT angio chest PE: no PE, lungs wnl   - CT head: No acute intracranial pathology. No evidence of midline shift, mass effect or intracranial hemorrhage.  given: vanc, cefepime, LR 1800ml  (07 Sep 2022 15:40)     INTERVAL EVENTS:  9/23: patient not in distress  9/24: plan for palliative extubation     ADVANCE DIRECTIVES:    DNR  MOLST  [ ]  Living Will  [ ]   DECISION MAKER(s):  [ ] Health Care Proxy(s)  [ ] Surrogate(s)  [ ] Guardian           Name(s): Phone Number(s):  Gui - daughters /Next of Kin    BASELINE (I)ADL(s) (prior to admission):  [place]: [x ]Total  [ ] Moderate [ ]Dependent  Palliative Performance Status Version 2:         %    http://npcrc.org/files/news/palliative_performance_scale_ppsv2.pdf    Allergies    penicillin (Unknown)    Intolerances    MEDICATIONS  (STANDING):  atorvastatin 20 milliGRAM(s) Oral at bedtime  chlorhexidine 0.12% Liquid 15 milliLiter(s) Oral Mucosa two times a day  chlorhexidine 2% Cloths 1 Application(s) Topical <User Schedule>  dextrose 5%. 1000 milliLiter(s) (50 mL/Hr) IV Continuous <Continuous>  dextrose 5%. 1000 milliLiter(s) (100 mL/Hr) IV Continuous <Continuous>  dextrose 50% Injectable 25 Gram(s) IV Push once  dextrose 50% Injectable 12.5 Gram(s) IV Push once  dextrose 50% Injectable 25 Gram(s) IV Push once  enoxaparin Injectable 40 milliGRAM(s) SubCutaneous every 24 hours  fentaNYL   Infusion. 0.5 MICROgram(s)/kG/Hr (2.44 mL/Hr) IV Continuous <Continuous>  glucagon  Injectable 1 milliGRAM(s) IntraMuscular once  insulin lispro (ADMELOG) corrective regimen sliding scale   SubCutaneous four times a day with meals  levothyroxine 75 MICROGram(s) Oral daily  midodrine 20 milliGRAM(s) Oral every 8 hours  mupirocin 2% Ointment 1 Application(s) Topical two times a day  norepinephrine Infusion 0.05 MICROgram(s)/kG/Min (2.28 mL/Hr) IV Continuous <Continuous>  pantoprazole   Suspension 40 milliGRAM(s) Oral daily  polyethylene glycol 3350 17 Gram(s) Oral two times a day  propofol Infusion 10 MICROgram(s)/kG/Min (2.92 mL/Hr) IV Continuous <Continuous>  senna 2 Tablet(s) Oral at bedtime    MEDICATIONS  (PRN):  acetaminophen     Tablet .. 650 milliGRAM(s) Oral every 6 hours PRN Temp greater or equal to 38C (100.4F), Mild Pain (1 - 3)  dextrose Oral Gel 15 Gram(s) Oral once PRN Blood Glucose LESS THAN 70 milliGRAM(s)/deciliter    PRESENT SYMPTOMS: [x ]Unable to obtain due to poor mentation   Source if other than patient:  [ ]Family   [ ]Team     Pain: [ ]yes [ ]no  QOL impact -   Location -                    Aggravating factors -  Quality -  Radiation -  Timing-  Severity (0-10 scale):  Minimal acceptable level (0-10 scale):     CPOT:    https://www.sccm.org/getattachment/mln49j45-7z4l-0b4r-5h1y-5337f1161h7k/Critical-Care-Pain-Observation-Tool-(CPOT)      PAIN AD Score:     [email or web address].pdf (press ctrl +  left click to view)    Dyspnea:                           [ ]Mild [ ]Moderate [ ]Severe  Anxiety:                             [ ]Mild [ ]Moderate [ x]Severe  Fatigue:                             [ ]Mild [ ]Moderate [ ]Severe  Nausea:                             [ ]Mild [ ]Moderate [ ]Severe  Loss of appetite:              [ ]Mild [ ]Moderate [ ]Severe  Constipation:                    [ ]Mild [ ]Moderate [ ]Severe    Other Symptoms:  [ ]All other review of systems negative     Palliative Performance Status Version 2:         %    http://[facility].org/files/news/palliative_performance_scale_ppsv2.pdf  PHYSICAL EXAM:  Vital Signs Last 24 Hrs  T(C): 36.3 (26 Sep 2022 03:45), Max: 36.8 (25 Sep 2022 12:00)  T(F): 97.4 (26 Sep 2022 03:45), Max: 98.2 (25 Sep 2022 12:00)  HR: 110 (26 Sep 2022 09:00) (44 - 110)  BP: 104/80 (26 Sep 2022 09:00) (76/37 - 204/84)  BP(mean): 89 (26 Sep 2022 09:00) (51 - 128)  RR: 28 (26 Sep 2022 09:00) (14 - 37)  SpO2: 98% (26 Sep 2022 09:00) (98% - 100%)    Parameters below as of 26 Sep 2022 09:00  Patient On (Oxygen Delivery Method): ventilator    O2 Concentration (%): 40 I&O's Summary    25 Sep 2022 07:01  -  26 Sep 2022 07:00  --------------------------------------------------------  IN: 2235.7 mL / OUT: 1000 mL / NET: 1235.7 mL    26 Sep 2022 07:01  -  26 Sep 2022 10:19  --------------------------------------------------------  IN: 13.5 mL / OUT: 0 mL / NET: 13.5 mL    GENERAL:  [ ]Alert  [ ]Oriented x   [ ]Lethargic  [ ]Cachexia  [ ]Unarousable  [ ]Verbal  [x ]Non-Verbal  Behavioral:   [ ] Anxiety  [ ] Delirium [ ] Agitation [x ] Other calm  HEENT:  [ ]Normal   [ ]Dry mouth   [x ]ET Tube/Trach  [ ]Oral lesions  PULMONARY:   [ ]Clear [x ]Tachypnea  [ ]Audible excessive secretions   [ ]Rhonchi        [ ]Right [ ]Left [ ]Bilateral  [ ]Crackles        [ ]Right [ ]Left [ ]Bilateral  [ ]Wheezing     [ ]Right [ ]Left [ ]Bilateral  [ ]Diminished breath sounds [ ]right [ ]left [ ]bilateral  CARDIOVASCULAR:    [x ]Regular [ ]Irregular [ ]Tachy  [ ]Favian [ ]Murmur [ ]Other  GASTROINTESTINAL:  [ ]Soft  [ ]Distended   [ ]+BS  [ ]Non tender [ ]Tender  [ ]PEG xOGT/ NGT  Last BM:   GENITOURINARY:  [ ]Normal [ ] Incontinent   [ ]Oliguria/Anuria   [ x]Patrick  MUSCULOSKELETAL:   [x ]Normal   [ ]Weakness  [ ]Bed/Wheelchair bound [ ]Edema  NEUROLOGIC: unlatoriable to assess - sedated on ventilator  [ ]No focal deficits  [ ]Cognitive impairment  [ ]Dysphagia [ ]Dysarthria [ ]Paresis [ ]Other   SKIN:   [ ]Normal    [ ]Rash  [ ]Pressure ulcer(s)       Present on admission [ ]y [ ]n    CRITICAL CARE:  [ ] Shock Present  [ x]Septic [ ]Cardiogenic [ ]Neurologic [ ]Hypovolemic  [ ]  Vasopressors [ ]  Inotropes   [x ]Respiratory failure present [ x]Mechanical ventilation [ ]Non-invasive ventilatory support [ ]High flow  [ ]Acute  [ ]Chronic [ ]Hypoxic  [ ]Hypercarbic [ ]Other  [ ]Other organ failure       LABS:                        9.2    5.53  )-----------( 276      ( 26 Sep 2022 04:04 )             28.5   09-26    147<H>  |  108  |  34<H>  ----------------------------<  129<H>  4.2   |  33<H>  |  0.7    Ca    8.7      26 Sep 2022 04:04  Phos  3.3     09-26  Mg     2.0     09-26    TPro  5.4<L>  /  Alb  3.1<L>  /  TBili  0.3  /  DBili  x   /  AST  25  /  ALT  23  /  AlkPhos  97  09-26        RADIOLOGY & ADDITIONAL STUDIES:    PROTEIN CALORIE MALNUTRITION PRESENT: [ ]mild [ ]moderate [ ]severe [ ]underweight [ ]morbid obesity  https://www.andeal.org/vault/2440/web/files/ONC/Table_Clinical%20Characteristics%20to%20Document%20Malnutrition-White%20JV%20et%20al%202012.pdf    Height (cm): 152.4 (09-08-22 @ 05:00)  Weight (kg): 48.7 (09-08-22 @ 05:00)  BMI (kg/m2): 21 (09-08-22 @ 05:00)    [ ]PPSV2 < or = to 30% [ ]significant weight loss  [ ]poor nutritional intake  [ ]anasarca      [ ]Artificial Nutrition      REFERRALS:   [ ]Chaplaincy  [ ]Hospice  [ ]Child Life  [x ]Social Work  [x ]Case management [ ]Holistic Therapy     Goals of Care Document:

## 2022-09-26 NOTE — PROGRESS NOTE ADULT - ASSESSMENT
Assessment	  IMPRESSION:    Acute hypercapnic respiratory failure  THERESA with decreased urine output - resolved   H/o Hypothyroidism  Altered mental status   Acute Toxic Metabolic Encephalopathy  SIRS  LLL opacity/pneumonia    PLAN:    CNS: Following commands.   SAT daily.  DC olanzapine.     HEENT: Oral care; ETT care.   - Family does not want trach    PULMONARY:  Keep same vent settings. been failing SBT  - SBT Daily; failed today due to low tidal volume.   At this point recommend tracheostomy placement  - Family has decided to move forward with palliative extubation and comfort measures only    CARDIOVASCULAR:  Keep equal balance. Back on low dose Levophed. midodrine 20mg M2reghg.     GI: Tolerating feeding. GI prophylaxis.  Bowel regimen. Lactic is normal. LFTs normal.     RENAL: Follow up lytes. Correct as needed. DC Patrick catheter. Kidney function at baseline. Keep equal balance.     INFECTIOUS DISEASE: Leukocytosis resolved. UA negative. Cultures negative.   COVID PCR positive.   Completed abx course.     HEMATOLOGICAL: DVT prophylaxis: Lovenox SQ.     ENDOCRINE: Follow up FS. Insulin protocol if needed.     MUSCULOSKELETAL: Bed in chair position.    DISPO: Critically ill, back on the ventilator, failed SBTs, hypotensive on pressors. She will need tracheostomy placement vs palliative liberation from the ventilator. Prognosis is dismal.

## 2022-09-27 NOTE — PROGRESS NOTE ADULT - ASSESSMENT
Assessment	  IMPRESSION:    Acute hypercapnic respiratory failure  THERESA with decreased urine output - resolved   H/o Hypothyroidism  Altered mental status   Acute Toxic Metabolic Encephalopathy  SIRS  LLL opacity/pneumonia    PLAN:    Patient is comfort measures only; ETT was removed yesterday. Plan is to switch Fentanyl drip to Morphine; titrate to patient comfort.

## 2022-09-27 NOTE — PROGRESS NOTE ADULT - REASON FOR ADMISSION
AMS, respiratory failure

## 2022-09-27 NOTE — PROGRESS NOTE ADULT - ASSESSMENT
IMPRESSION:    Acute hypercapnic respiratory failure  THERESA with decreased urine output - resolved   H/o Hypothyroidism  Altered mental status   Acute Toxic Metabolic Encephalopathy  SIRS  LLL opacity/pneumonia    PLAN:   switch fentanyl drip to morphine drip   follow palliative care team   keep comfort care  morphine drip no blood drawn

## 2022-09-27 NOTE — PROVIDER CONTACT NOTE (OTHER) - BACKGROUND
89y F w/pmhx of HTN, HLD, hypothyroidism, recent L. femur fracture s/p surgery 1mo in [facility]. From [facility] home for hypoxia & hypotension and unresponsive, O2 50% on RA. she was intubated.

## 2022-09-27 NOTE — PROVIDER CONTACT NOTE (OTHER) - ACTION/TREATMENT ORDERED:
MD evaluated Patient and notified family ate the bedside. EKG monitor strip printed MD Khan evaluated patient and pronounced Dead at 10:08 AM, notified family at the bedside. EKG monitor strip printed, All drips D/C and Pt removed from EKG monitor Hugo Lopez evaluated patient and pronounced Dead at 10:08 AM, notified family at the bedside. EKG monitor strip printed, All drips D/C and Pt removed from EKG monitor Bill Lopez evaluated patient and pronounced Dead at 10:08 AM, notified family at the bedside. EKG monitor strip printed, All drips D/C and Pt removed from EKG monitor

## 2022-09-27 NOTE — PROGRESS NOTE ADULT - SUBJECTIVE AND OBJECTIVE BOX
HPI:  89yo F w/ pmhx of HTN, HLD, hypothyroidism, recent left femur fracture s/p surgery 1mo in [facility] presented to ED from [facility] with hypoxia and hypotension. Patient was saturating 50% on room air and improved only minimally on NRB in the ED. History was not able to be obtained by the patient as she was unresponsive and she was intubated for airway protection. Collateral HPI obtained by daughter Becky who said she was sent to the NH for rehab for her hip fracture. She denies any history of dementia. Baseline mental status is AAOx3 and prior to hip fracture patient was living by herself.     Of note patient was being treated in NH for R sided pneumonia (CXR 9/6: R perihilar infiltrate) and was receiving levaquin.         INTERVAL HISTORY:    - Patient is now comfort measures only    PAST MEDICAL & SURGICAL HISTORY  HTN (hypertension)    High cholesterol    Hypothyroid    History of colon resection    H/O: hysterectomy        ALLERGIES:  penicillin (Unknown)      MEDICATIONS:  MEDICATIONS  (STANDING):  fentaNYL   Infusion. 0.501 MICROgram(s)/kG/Hr (2.44 mL/Hr) IV Continuous <Continuous>  glycopyrrolate Injectable 0.2 milliGRAM(s) IV Push every 6 hours  morphine  Infusion 1 mG/Hr (1 mL/Hr) IV Continuous <Continuous>  pantoprazole   Suspension 40 milliGRAM(s) Oral daily    MEDICATIONS  (PRN):  acetaminophen     Tablet .. 650 milliGRAM(s) Oral every 6 hours PRN Temp greater or equal to 38C (100.4F), Mild Pain (1 - 3)  HYDROmorphone  Injectable 0.5 milliGRAM(s) IV Push every 15 minutes PRN dyspnea or pain  LORazepam   Injectable 2 milliGRAM(s) IV Push every 1 hour PRN anxiety or restlessness      HOME MEDICATIONS:  Home Medications:  ascorbic acid 500 mg oral tablet: 1 tab(s) orally 2 times a day (07 Sep 2022 15:52)  docusate sodium 100 mg oral tablet: 1 tab(s) orally 2 times a day (07 Sep 2022 15:52)  lactulose 10 g oral powder for reconstitution: 30 milliliter(s) orally 2 times a day, As Needed for constipation (07 Sep 2022 15:52)  Lipitor 20 mg oral tablet: 1 tab(s) orally once a day (07 Sep 2022 15:52)  Melatonin 3 mg oral tablet: 1 tab(s) orally once a day (at bedtime) (07 Sep 2022 15:52)  Multiple Vitamins oral tablet: 1 tab(s) orally once a day (07 Sep 2022 15:52)  Synthroid 75 mcg (0.075 mg) oral tablet: 1 tab(s) orally once a day (07 Sep 2022 15:52)        OBJECTIVE:  ICU Vital Signs Last 24 Hrs  T(C): 36.4 (26 Sep 2022 12:00), Max: 36.4 (26 Sep 2022 12:00)  T(F): 97.6 (26 Sep 2022 12:00), Max: 97.6 (26 Sep 2022 12:00)  HR: 113 (26 Sep 2022 20:00) (86 - 124)  BP: 133/68 (26 Sep 2022 20:00) (78/44 - 197/88)  BP(mean): 89 (26 Sep 2022 20:00) (55 - 127)  ABP: --  ABP(mean): --  RR: 21 (26 Sep 2022 20:00) (7 - 31)  SpO2: 90% (26 Sep 2022 20:00) (72% - 98%)    O2 Parameters below as of 26 Sep 2022 20:00  Patient On (Oxygen Delivery Method): room air      26 Sep 2022 07:01  -  27 Sep 2022 07:00  --------------------------------------------------------  IN: 282.4 mL / OUT: 1150 mL / NET: -867.6 mL      PHYSICAL EXAM:  NEURO: patient is awake , alert and oriented  GEN: Not in acute distress  NECK: no thyroid enlargement, no JVD  LUNGS: Clear to auscultation bilaterally   CARDIOVASCULAR: S1/S2 present, RRR , no murmurs or rubs, no carotid bruits,  + PP bilaterally  ABD: Soft, non-tender, non-distended, +BS  EXT: No KRISTOPHER  SKIN: Intact  ACCESS Site:    LABS:                        9.2    5.53  )-----------( 276      ( 26 Sep 2022 04:04 )             28.5     09-26    147<H>  |  108  |  34<H>  ----------------------------<  129<H>  4.2   |  33<H>  |  0.7    Ca    8.7      26 Sep 2022 04:04  Phos  3.3     09-26  Mg     2.0     09-26    TPro  5.4<L>  /  Alb  3.1<L>  /  TBili  0.3  /  DBili  x   /  AST  25  /  ALT  23  /  AlkPhos  97  09-26        RADIOLOGY:      ECG:    TELEMETRY EVENTS:

## 2022-09-27 NOTE — PROGRESS NOTE ADULT - SUBJECTIVE AND OBJECTIVE BOX
Patient is a 89y old  Female who presents with a chief complaint of AMS, respiratory failure (26 Sep 2022 13:30)      Over Night Events:  Patient seen and examined.   s/p terminal wean on fentnayl drip   been followed by pallaitive team   comfort care     ROS:  See HPI    PHYSICAL EXAM    ICU Vital Signs Last 24 Hrs  T(C): 36.4 (26 Sep 2022 12:00), Max: 36.4 (26 Sep 2022 12:00)  T(F): 97.6 (26 Sep 2022 12:00), Max: 97.6 (26 Sep 2022 12:00)  HR: 113 (26 Sep 2022 20:00) (50 - 124)  BP: 133/68 (26 Sep 2022 20:00) (78/44 - 197/88)  BP(mean): 89 (26 Sep 2022 20:00) (55 - 127)  ABP: --  ABP(mean): --  RR: 21 (26 Sep 2022 20:00) (7 - 37)  SpO2: 90% (26 Sep 2022 20:00) (72% - 99%)    O2 Parameters below as of 26 Sep 2022 20:00  Patient On (Oxygen Delivery Method): room air            General:not awake   HEENT:   pale              Lymph Nodes: NO cervical LN   Lungs: Bilateral BS  Cardiovascular: Regular   Abdomen: Soft, Positive BS  Extremities: No clubbing   Skin: warm   Neurological:   Musculoskeletal: move all ext     I&O's Detail    26 Sep 2022 07:01  -  27 Sep 2022 07:00  --------------------------------------------------------  IN:    FentaNYL: 48.8 mL    FentaNYL: 224.6 mL    Norepinephrine: 9 mL  Total IN: 282.4 mL    OUT:    Incontinent per Collection Bag (mL): 1150 mL    Propofol: 0 mL  Total OUT: 1150 mL    Total NET: -867.6 mL          LABS:                          9.2    5.53  )-----------( 276      ( 26 Sep 2022 04:04 )             28.5         26 Sep 2022 04:04    147    |  108    |  34     ----------------------------<  129    4.2     |  33     |  0.7      Ca    8.7        26 Sep 2022 04:04  Phos  3.3       26 Sep 2022 04:04  Mg     2.0       26 Sep 2022 04:04                                                                                                                                                                                                                                   ABG - ( 26 Sep 2022 02:59 )  pH, Arterial: 7.44  pH, Blood: x     /  pCO2: 47    /  pO2: 166   / HCO3: 32    / Base Excess: 6.7   /  SaO2: 100.0               MEDICATIONS  (STANDING):  fentaNYL   Infusion. 0.501 MICROgram(s)/kG/Hr (2.44 mL/Hr) IV Continuous <Continuous>  glycopyrrolate Injectable 0.2 milliGRAM(s) IV Push every 6 hours  pantoprazole   Suspension 40 milliGRAM(s) Oral daily    MEDICATIONS  (PRN):  acetaminophen     Tablet .. 650 milliGRAM(s) Oral every 6 hours PRN Temp greater or equal to 38C (100.4F), Mild Pain (1 - 3)  HYDROmorphone  Injectable 0.5 milliGRAM(s) IV Push every 15 minutes PRN dyspnea or pain  LORazepam   Injectable 2 milliGRAM(s) IV Push every 1 hour PRN anxiety or restlessness          Xrays:  TLC:  OG:  ET tube:                                                                                       ECHO:  [facility] ICU:

## 2022-09-27 NOTE — DISCHARGE NOTE FOR THE EXPIRED PATIENT - HOSPITAL COURSE
Patient is an 88yo F w/ pmhx of HTN, HLD, hypothyroidism, recent left femur fracture s/p surgery (8/22) in [facility] presented to ED from [facility] with hypoxia and hypotension. Patient was saturating 50% on room air and improved only minimally on NRB in the ED. History was not able to be obtained by the patient as she was unresponsive and she was intubated for airway protection. Collateral HPI obtained by daughter Becky who said she was sent to the NH for rehab for her hip fracture. She denies any history of dementia. Baseline mental status is AAOx3 and prior to hip fracture patient was living by herself. Of note, patient was being treated in nursing home for R sided pneumonia     In the ED patint's Chest XRay was unremarkable; CT-Angio Chest showed no signs of PE and CT-head was negative for acute intracranial pathology.Patient had an elevated white count and treated for sepsis with Vancomycin and Cefepime. Patient's admitting diagnosis: acute hypoxic respiratory failure and sepsis secondary to COVID.     Hospital Course:    From the time of admission, patient received daily SAT/SBT trials without succes. Patient as extubated once and had to be reintubated 30 minutes later due to low tidal volume and agitation. Patient continued to fail SBT trials from then on. In addition, patient successfully completed her antibiotics and infection/COVID resolved.     DNR was explained to patient's daughter and they agreed to wanting to focus on comfort. No more central lines (one is in and should be kept), no more SBT where sedation is lowered because pt has such a bad reaction to lowering sedation. DNR put in place with comfort measures only. Patient was paliatively extubated on 9/26 and given 0.5 Dilaudid and Ativan PRN for comfort.     Was notified by RN that patient became pulseless and apneic, Confirmed patient had no pulse at this time and pronounced time of death at 10:08am.

## 2022-09-27 NOTE — PROVIDER CONTACT NOTE (OTHER) - SITUATION
PT DNR, CMO on comfort care, with IV infusion of pain management medication, with family at bedside. PT DNR, S/P extubation on comfort care, with IV infusion of pain management medication, with family at bedside. PT DNR, S/P extubation 9/26/22 on comfort care, with IV infusion of pain management medication, with family at bedside. PT DNR, S/P extubation 9/26/22 on comfort care, with IV infusion of pain management medication, with family at bedside, Agonal breathing with no palpable pulse

## 2022-10-07 DIAGNOSIS — J96.02 ACUTE RESPIRATORY FAILURE WITH HYPERCAPNIA: ICD-10-CM

## 2022-10-07 DIAGNOSIS — A41.9 SEPSIS, UNSPECIFIED ORGANISM: ICD-10-CM

## 2022-10-07 DIAGNOSIS — Z78.1 PHYSICAL RESTRAINT STATUS: ICD-10-CM

## 2022-10-07 DIAGNOSIS — R32 UNSPECIFIED URINARY INCONTINENCE: ICD-10-CM

## 2022-10-07 DIAGNOSIS — R65.21 SEVERE SEPSIS WITH SEPTIC SHOCK: ICD-10-CM

## 2022-10-07 DIAGNOSIS — R15.9 FULL INCONTINENCE OF FECES: ICD-10-CM

## 2022-10-07 DIAGNOSIS — E83.39 OTHER DISORDERS OF PHOSPHORUS METABOLISM: ICD-10-CM

## 2022-10-07 DIAGNOSIS — N17.9 ACUTE KIDNEY FAILURE, UNSPECIFIED: ICD-10-CM

## 2022-10-07 DIAGNOSIS — U07.1 COVID-19: ICD-10-CM

## 2022-10-07 DIAGNOSIS — E78.5 HYPERLIPIDEMIA, UNSPECIFIED: ICD-10-CM

## 2022-10-07 DIAGNOSIS — R45.1 RESTLESSNESS AND AGITATION: ICD-10-CM

## 2022-10-07 DIAGNOSIS — Z66 DO NOT RESUSCITATE: ICD-10-CM

## 2022-10-07 DIAGNOSIS — E03.9 HYPOTHYROIDISM, UNSPECIFIED: ICD-10-CM

## 2022-10-07 DIAGNOSIS — Z51.5 ENCOUNTER FOR PALLIATIVE CARE: ICD-10-CM

## 2022-10-07 DIAGNOSIS — Z88.0 ALLERGY STATUS TO PENICILLIN: ICD-10-CM

## 2022-10-07 DIAGNOSIS — J18.9 PNEUMONIA, UNSPECIFIED ORGANISM: ICD-10-CM

## 2022-10-07 DIAGNOSIS — I10 ESSENTIAL (PRIMARY) HYPERTENSION: ICD-10-CM

## 2022-10-07 DIAGNOSIS — J90 PLEURAL EFFUSION, NOT ELSEWHERE CLASSIFIED: ICD-10-CM

## 2022-10-07 DIAGNOSIS — E87.3 ALKALOSIS: ICD-10-CM

## 2022-10-07 DIAGNOSIS — J96.01 ACUTE RESPIRATORY FAILURE WITH HYPOXIA: ICD-10-CM

## 2022-10-07 DIAGNOSIS — Z74.01 BED CONFINEMENT STATUS: ICD-10-CM

## 2022-10-07 DIAGNOSIS — G92.8 OTHER TOXIC ENCEPHALOPATHY: ICD-10-CM

## 2022-10-07 DIAGNOSIS — F41.9 ANXIETY DISORDER, UNSPECIFIED: ICD-10-CM

## 2022-10-07 DIAGNOSIS — Z90.710 ACQUIRED ABSENCE OF BOTH CERVIX AND UTERUS: ICD-10-CM

## 2022-10-07 DIAGNOSIS — A41.89 OTHER SPECIFIED SEPSIS: ICD-10-CM

## 2023-10-18 NOTE — ED PROCEDURE NOTE - CPROC ED COMPLICATIONS1
VN note: Patient chart, labs, and vitals reviewed. VN to continue to be available as needed.       Problem: Adult Inpatient Plan of Care  Goal: Plan of Care Review  Outcome: Ongoing, Progressing      no

## 2023-12-19 NOTE — PROGRESS NOTE ADULT - PROVIDER SPECIALTY LIST ADULT
CC: PT HERE FOR A COMPREHENSIVE OCULAR MEDICAL EVALUATION. PT ONLY WEARS GLASSES AT NIGHT    OCCUPATION: TEACHER     Medications reviewed and updated.  Denies known Latex allergy or symptoms of Latex sensitivity.  Tobacco verified.    PCP:Lulú Guardado PA  POH:  NASAL INCLUSION CYST OS, DERMATITIS, ALLERGIES  FAM HX: [facility]--MOTHER          CCU
